# Patient Record
Sex: FEMALE | Race: BLACK OR AFRICAN AMERICAN | NOT HISPANIC OR LATINO | Employment: UNEMPLOYED | URBAN - METROPOLITAN AREA
[De-identification: names, ages, dates, MRNs, and addresses within clinical notes are randomized per-mention and may not be internally consistent; named-entity substitution may affect disease eponyms.]

---

## 2018-10-16 ENCOUNTER — OUTSIDE PLACE OF SERVICE (OUTPATIENT)
Dept: CARDIOLOGY | Facility: CLINIC | Age: 63
End: 2018-10-16
Payer: MEDICAID

## 2018-10-16 PROCEDURE — 93010 ELECTROCARDIOGRAM REPORT: CPT | Mod: S$GLB,,, | Performed by: INTERNAL MEDICINE

## 2019-01-31 ENCOUNTER — OUTSIDE PLACE OF SERVICE (OUTPATIENT)
Dept: CARDIOLOGY | Facility: CLINIC | Age: 64
End: 2019-01-31
Payer: MEDICAID

## 2019-01-31 PROCEDURE — 93010 PR ELECTROCARDIOGRAM REPORT: ICD-10-PCS | Mod: S$GLB,,, | Performed by: INTERNAL MEDICINE

## 2019-01-31 PROCEDURE — 93010 ELECTROCARDIOGRAM REPORT: CPT | Mod: S$GLB,,, | Performed by: INTERNAL MEDICINE

## 2021-03-12 ENCOUNTER — HOSPITAL ENCOUNTER (INPATIENT)
Facility: HOSPITAL | Age: 66
LOS: 2 days | Discharge: HOME | DRG: 247 | End: 2021-03-14
Attending: EMERGENCY MEDICINE | Admitting: INTERNAL MEDICINE
Payer: COMMERCIAL

## 2021-03-12 DIAGNOSIS — I21.3 ACUTE ST ELEVATION MYOCARDIAL INFARCTION (STEMI): ICD-10-CM

## 2021-03-12 DIAGNOSIS — I21.11 ACUTE ST ELEVATION MYOCARDIAL INFARCTION (STEMI) INVOLVING RIGHT CORONARY ARTERY: ICD-10-CM

## 2021-03-12 DIAGNOSIS — I21.11 ST ELEVATION MYOCARDIAL INFARCTION INVOLVING RIGHT CORONARY ARTERY: ICD-10-CM

## 2021-03-12 DIAGNOSIS — I21.02 ST ELEVATION MYOCARDIAL INFARCTION INVOLVING LEFT ANTERIOR DESCENDING (LAD) CORONARY ARTERY: ICD-10-CM

## 2021-03-12 DIAGNOSIS — R07.9 CHEST PAIN: ICD-10-CM

## 2021-03-12 DIAGNOSIS — I21.3 STEMI (ST ELEVATION MYOCARDIAL INFARCTION): Primary | ICD-10-CM

## 2021-03-12 LAB
ABO + RH BLD: NORMAL
ALBUMIN SERPL BCP-MCNC: 4 G/DL (ref 3.5–5.2)
ALP SERPL-CCNC: 145 U/L (ref 55–135)
ALT SERPL W/O P-5'-P-CCNC: 20 U/L (ref 10–44)
ANION GAP SERPL CALC-SCNC: 11 MMOL/L (ref 8–16)
AORTIC ROOT ANNULUS: 2.59 CM
ASCENDING AORTA: 2.61 CM
AST SERPL-CCNC: 132 U/L (ref 10–40)
AV INDEX (PROSTH): 0.86
AV MEAN GRADIENT: 3 MMHG
AV PEAK GRADIENT: 6 MMHG
AV VALVE AREA: 2.43 CM2
AV VELOCITY RATIO: 0.76
BASOPHILS # BLD AUTO: 0.07 K/UL (ref 0–0.2)
BASOPHILS NFR BLD: 0.3 % (ref 0–1.9)
BILIRUB SERPL-MCNC: 0.3 MG/DL (ref 0.1–1)
BLD GP AB SCN CELLS X3 SERPL QL: NORMAL
BNP SERPL-MCNC: 25 PG/ML (ref 0–99)
BUN SERPL-MCNC: 16 MG/DL (ref 8–23)
CALCIUM SERPL-MCNC: 9.7 MG/DL (ref 8.7–10.5)
CHLORIDE SERPL-SCNC: 104 MMOL/L (ref 95–110)
CK SERPL-CCNC: 2181 U/L (ref 20–180)
CO2 SERPL-SCNC: 24 MMOL/L (ref 23–29)
CREAT SERPL-MCNC: 0.9 MG/DL (ref 0.5–1.4)
CV ECHO LV RWT: 0.76 CM
DIFFERENTIAL METHOD: ABNORMAL
DOP CALC AO PEAK VEL: 1.19 M/S
DOP CALC AO VTI: 21.65 CM
DOP CALC LVOT AREA: 2.8 CM2
DOP CALC LVOT DIAMETER: 1.9 CM
DOP CALC LVOT PEAK VEL: 0.91 M/S
DOP CALC LVOT STROKE VOLUME: 52.62 CM3
DOP CALC RVOT PEAK VEL: 0.58 M/S
DOP CALC RVOT VTI: 10.14 CM
DOP CALCLVOT PEAK VEL VTI: 18.57 CM
E WAVE DECELERATION TIME: 176.21 MSEC
E/A RATIO: 0.56
E/E' RATIO: 8.83 M/S
ECHO LV POSTERIOR WALL: 1.28 CM (ref 0.6–1.1)
EOSINOPHIL # BLD AUTO: 0.1 K/UL (ref 0–0.5)
EOSINOPHIL NFR BLD: 0.2 % (ref 0–8)
ERYTHROCYTE [DISTWIDTH] IN BLOOD BY AUTOMATED COUNT: 15 % (ref 11.5–14.5)
EST. GFR  (AFRICAN AMERICAN): >60 ML/MIN/1.73 M^2
EST. GFR  (NON AFRICAN AMERICAN): >60 ML/MIN/1.73 M^2
FRACTIONAL SHORTENING: 28 % (ref 28–44)
GLUCOSE SERPL-MCNC: 128 MG/DL (ref 70–110)
HCT VFR BLD AUTO: 42.4 % (ref 37–48.5)
HGB BLD-MCNC: 13.4 G/DL (ref 12–16)
IMM GRANULOCYTES # BLD AUTO: 0.14 K/UL (ref 0–0.04)
IMM GRANULOCYTES NFR BLD AUTO: 0.7 % (ref 0–0.5)
INTERVENTRICULAR SEPTUM: 1.38 CM (ref 0.6–1.1)
IVRT: 77.07 MSEC
LA MAJOR: 3.87 CM
LA MINOR: 3.48 CM
LEFT ATRIUM SIZE: 3.31 CM
LEFT INTERNAL DIMENSION IN SYSTOLE: 2.45 CM (ref 2.1–4)
LEFT VENTRICLE DIASTOLIC VOLUME: 46.82 ML
LEFT VENTRICLE SYSTOLIC VOLUME: 21.2 ML
LEFT VENTRICULAR INTERNAL DIMENSION IN DIASTOLE: 3.38 CM (ref 3.5–6)
LEFT VENTRICULAR MASS: 151.8 G
LIPASE SERPL-CCNC: 19 U/L (ref 4–60)
LV LATERAL E/E' RATIO: 7.57 M/S
LV SEPTAL E/E' RATIO: 10.6 M/S
LYMPHOCYTES # BLD AUTO: 2.7 K/UL (ref 1–4.8)
LYMPHOCYTES NFR BLD: 13.1 % (ref 18–48)
MCH RBC QN AUTO: 25.3 PG (ref 27–31)
MCHC RBC AUTO-ENTMCNC: 31.6 G/DL (ref 32–36)
MCV RBC AUTO: 80 FL (ref 82–98)
MONOCYTES # BLD AUTO: 0.8 K/UL (ref 0.3–1)
MONOCYTES NFR BLD: 3.7 % (ref 4–15)
MV PEAK A VEL: 0.95 M/S
MV PEAK E VEL: 0.53 M/S
MV STENOSIS PRESSURE HALF TIME: 51.1 MS
MV VALVE AREA P 1/2 METHOD: 4.31 CM2
NEUTROPHILS # BLD AUTO: 16.9 K/UL (ref 1.8–7.7)
NEUTROPHILS NFR BLD: 82 % (ref 38–73)
NRBC BLD-RTO: 0 /100 WBC
PISA MRMAX VEL: 0.04 M/S
PISA TR MAX VEL: 2.14 M/S
PLATELET # BLD AUTO: 328 K/UL (ref 150–350)
PMV BLD AUTO: 10 FL (ref 9.2–12.9)
POC ACTIVATED CLOTTING TIME K: 246 SEC (ref 74–137)
POC ACTIVATED CLOTTING TIME K: 384 SEC (ref 74–137)
POCT GLUCOSE: 101 MG/DL (ref 70–110)
POCT GLUCOSE: 135 MG/DL (ref 70–110)
POCT GLUCOSE: 137 MG/DL (ref 70–110)
POTASSIUM SERPL-SCNC: 4.1 MMOL/L (ref 3.5–5.1)
PROT SERPL-MCNC: 8 G/DL (ref 6–8.4)
PV MEAN GRADIENT: 1 MMHG
RA MAJOR: 3.54 CM
RA PRESSURE: 3 MMHG
RBC # BLD AUTO: 5.3 M/UL (ref 4–5.4)
SAMPLE: ABNORMAL
SAMPLE: ABNORMAL
SINUS: 2.72 CM
SODIUM SERPL-SCNC: 139 MMOL/L (ref 136–145)
STJ: 2.62 CM
TDI LATERAL: 0.07 M/S
TDI SEPTAL: 0.05 M/S
TDI: 0.06 M/S
TR MAX PG: 18 MMHG
TRICUSPID ANNULAR PLANE SYSTOLIC EXCURSION: 1.72 CM
TROPONIN I SERPL DL<=0.01 NG/ML-MCNC: 12.29 NG/ML (ref 0–0.03)
TV REST PULMONARY ARTERY PRESSURE: 21 MMHG
WBC # BLD AUTO: 20.61 K/UL (ref 3.9–12.7)

## 2021-03-12 PROCEDURE — 36415 COLL VENOUS BLD VENIPUNCTURE: CPT | Performed by: EMERGENCY MEDICINE

## 2021-03-12 PROCEDURE — 25000003 PHARM REV CODE 250: Performed by: INTERNAL MEDICINE

## 2021-03-12 PROCEDURE — 99152 MOD SED SAME PHYS/QHP 5/>YRS: CPT | Performed by: INTERNAL MEDICINE

## 2021-03-12 PROCEDURE — C1887 CATHETER, GUIDING: HCPCS | Performed by: INTERNAL MEDICINE

## 2021-03-12 PROCEDURE — C1725 CATH, TRANSLUMIN NON-LASER: HCPCS | Performed by: INTERNAL MEDICINE

## 2021-03-12 PROCEDURE — 99291 CRITICAL CARE FIRST HOUR: CPT | Mod: 25

## 2021-03-12 PROCEDURE — 99153 MOD SED SAME PHYS/QHP EA: CPT | Performed by: INTERNAL MEDICINE

## 2021-03-12 PROCEDURE — C1894 INTRO/SHEATH, NON-LASER: HCPCS | Performed by: INTERNAL MEDICINE

## 2021-03-12 PROCEDURE — 83690 ASSAY OF LIPASE: CPT | Performed by: EMERGENCY MEDICINE

## 2021-03-12 PROCEDURE — 84484 ASSAY OF TROPONIN QUANT: CPT | Performed by: EMERGENCY MEDICINE

## 2021-03-12 PROCEDURE — 93458 L HRT ARTERY/VENTRICLE ANGIO: CPT | Mod: 26,59,51, | Performed by: INTERNAL MEDICINE

## 2021-03-12 PROCEDURE — 21400001 HC TELEMETRY ROOM

## 2021-03-12 PROCEDURE — 27201423 OPTIME MED/SURG SUP & DEVICES STERILE SUPPLY: Performed by: INTERNAL MEDICINE

## 2021-03-12 PROCEDURE — 99291 PR CRITICAL CARE, E/M 30-74 MINUTES: ICD-10-PCS | Mod: 25,,, | Performed by: INTERNAL MEDICINE

## 2021-03-12 PROCEDURE — 25500020 PHARM REV CODE 255: Performed by: INTERNAL MEDICINE

## 2021-03-12 PROCEDURE — 85025 COMPLETE CBC W/AUTO DIFF WBC: CPT | Performed by: EMERGENCY MEDICINE

## 2021-03-12 PROCEDURE — C1874 STENT, COATED/COV W/DEL SYS: HCPCS | Performed by: INTERNAL MEDICINE

## 2021-03-12 PROCEDURE — 25000003 PHARM REV CODE 250: Performed by: EMERGENCY MEDICINE

## 2021-03-12 PROCEDURE — C9606 PERC D-E COR REVASC W AMI S: HCPCS | Performed by: INTERNAL MEDICINE

## 2021-03-12 PROCEDURE — 86900 BLOOD TYPING SEROLOGIC ABO: CPT | Performed by: EMERGENCY MEDICINE

## 2021-03-12 PROCEDURE — 93458 PR CATH PLACE/CORON ANGIO, IMG SUPER/INTERP,W LEFT HEART VENTRICULOGRAPHY: ICD-10-PCS | Mod: 26,59,51, | Performed by: INTERNAL MEDICINE

## 2021-03-12 PROCEDURE — 93458 L HRT ARTERY/VENTRICLE ANGIO: CPT | Performed by: INTERNAL MEDICINE

## 2021-03-12 PROCEDURE — 63600175 PHARM REV CODE 636 W HCPCS: Performed by: INTERNAL MEDICINE

## 2021-03-12 PROCEDURE — 93010 EKG 12-LEAD: ICD-10-PCS | Mod: ,,, | Performed by: INTERNAL MEDICINE

## 2021-03-12 PROCEDURE — 99152 MOD SED SAME PHYS/QHP 5/>YRS: CPT | Mod: ,,, | Performed by: INTERNAL MEDICINE

## 2021-03-12 PROCEDURE — C1769 GUIDE WIRE: HCPCS | Performed by: INTERNAL MEDICINE

## 2021-03-12 PROCEDURE — 99152 PR MOD CONSCIOUS SEDATION, SAME PHYS, 5+ YRS, FIRST 15 MIN: ICD-10-PCS | Mod: ,,, | Performed by: INTERNAL MEDICINE

## 2021-03-12 PROCEDURE — 80053 COMPREHEN METABOLIC PANEL: CPT | Performed by: EMERGENCY MEDICINE

## 2021-03-12 PROCEDURE — 25000003 PHARM REV CODE 250: Performed by: NURSE PRACTITIONER

## 2021-03-12 PROCEDURE — 92941 PR AMI ANY METHOD: ICD-10-PCS | Mod: RC,,, | Performed by: INTERNAL MEDICINE

## 2021-03-12 PROCEDURE — 99291 CRITICAL CARE FIRST HOUR: CPT | Mod: 25,,, | Performed by: INTERNAL MEDICINE

## 2021-03-12 PROCEDURE — C1760 CLOSURE DEV, VASC: HCPCS | Performed by: INTERNAL MEDICINE

## 2021-03-12 PROCEDURE — 92941 PRQ TRLML REVSC TOT OCCL AMI: CPT | Mod: RC,,, | Performed by: INTERNAL MEDICINE

## 2021-03-12 PROCEDURE — 85347 COAGULATION TIME ACTIVATED: CPT | Performed by: INTERNAL MEDICINE

## 2021-03-12 PROCEDURE — 93005 ELECTROCARDIOGRAM TRACING: CPT

## 2021-03-12 PROCEDURE — 93010 ELECTROCARDIOGRAM REPORT: CPT | Mod: ,,, | Performed by: INTERNAL MEDICINE

## 2021-03-12 PROCEDURE — 82550 ASSAY OF CK (CPK): CPT | Performed by: EMERGENCY MEDICINE

## 2021-03-12 PROCEDURE — 83880 ASSAY OF NATRIURETIC PEPTIDE: CPT | Performed by: EMERGENCY MEDICINE

## 2021-03-12 PROCEDURE — 94761 N-INVAS EAR/PLS OXIMETRY MLT: CPT

## 2021-03-12 DEVICE — ANGIO-SEAL VIP VASCULAR CLOSURE DEVICE
Type: IMPLANTABLE DEVICE | Site: HEART | Status: FUNCTIONAL
Brand: ANGIO-SEAL

## 2021-03-12 DEVICE — IMPLANTABLE DEVICE: Type: IMPLANTABLE DEVICE | Site: HEART | Status: FUNCTIONAL

## 2021-03-12 RX ORDER — ATORVASTATIN CALCIUM 40 MG/1
80 TABLET, FILM COATED ORAL NIGHTLY
Status: DISCONTINUED | OUTPATIENT
Start: 2021-03-12 | End: 2021-03-14 | Stop reason: HOSPADM

## 2021-03-12 RX ORDER — ONDANSETRON 8 MG/1
8 TABLET, ORALLY DISINTEGRATING ORAL EVERY 8 HOURS PRN
Status: DISCONTINUED | OUTPATIENT
Start: 2021-03-12 | End: 2021-03-14 | Stop reason: HOSPADM

## 2021-03-12 RX ORDER — DIPHENHYDRAMINE HYDROCHLORIDE 50 MG/ML
INJECTION INTRAMUSCULAR; INTRAVENOUS
Status: DISCONTINUED | OUTPATIENT
Start: 2021-03-12 | End: 2021-03-12 | Stop reason: HOSPADM

## 2021-03-12 RX ORDER — ACETAMINOPHEN 325 MG/1
650 TABLET ORAL EVERY 4 HOURS PRN
Status: DISCONTINUED | OUTPATIENT
Start: 2021-03-12 | End: 2021-03-14 | Stop reason: HOSPADM

## 2021-03-12 RX ORDER — FENTANYL CITRATE 50 UG/ML
INJECTION, SOLUTION INTRAMUSCULAR; INTRAVENOUS
Status: DISCONTINUED | OUTPATIENT
Start: 2021-03-12 | End: 2021-03-12 | Stop reason: HOSPADM

## 2021-03-12 RX ORDER — METOPROLOL TARTRATE 25 MG/1
25 TABLET, FILM COATED ORAL 2 TIMES DAILY
Status: DISCONTINUED | OUTPATIENT
Start: 2021-03-12 | End: 2021-03-14 | Stop reason: HOSPADM

## 2021-03-12 RX ORDER — PHENYLEPHRINE HYDROCHLORIDE 10 MG/ML
INJECTION INTRAVENOUS
Status: DISCONTINUED | OUTPATIENT
Start: 2021-03-12 | End: 2021-03-12 | Stop reason: HOSPADM

## 2021-03-12 RX ORDER — ASPIRIN 325 MG
325 TABLET ORAL
Status: COMPLETED | OUTPATIENT
Start: 2021-03-12 | End: 2021-03-12

## 2021-03-12 RX ORDER — GABAPENTIN 300 MG/1
300 CAPSULE ORAL 2 TIMES DAILY
Status: DISCONTINUED | OUTPATIENT
Start: 2021-03-12 | End: 2021-03-14 | Stop reason: HOSPADM

## 2021-03-12 RX ORDER — HEPARIN SODIUM 1000 [USP'U]/ML
INJECTION INTRAVENOUS; SUBCUTANEOUS
Status: DISCONTINUED | OUTPATIENT
Start: 2021-03-12 | End: 2021-03-12 | Stop reason: HOSPADM

## 2021-03-12 RX ORDER — MUPIROCIN 20 MG/G
OINTMENT TOPICAL 2 TIMES DAILY
Status: DISCONTINUED | OUTPATIENT
Start: 2021-03-12 | End: 2021-03-14 | Stop reason: HOSPADM

## 2021-03-12 RX ORDER — NITROGLYCERIN 5 MG/ML
INJECTION, SOLUTION INTRAVENOUS
Status: DISCONTINUED | OUTPATIENT
Start: 2021-03-12 | End: 2021-03-12 | Stop reason: HOSPADM

## 2021-03-12 RX ORDER — LIDOCAINE HYDROCHLORIDE 20 MG/ML
INJECTION, SOLUTION EPIDURAL; INFILTRATION; INTRACAUDAL; PERINEURAL
Status: DISCONTINUED | OUTPATIENT
Start: 2021-03-12 | End: 2021-03-12 | Stop reason: HOSPADM

## 2021-03-12 RX ORDER — MIDAZOLAM HYDROCHLORIDE 1 MG/ML
INJECTION, SOLUTION INTRAMUSCULAR; INTRAVENOUS
Status: DISCONTINUED | OUTPATIENT
Start: 2021-03-12 | End: 2021-03-12 | Stop reason: HOSPADM

## 2021-03-12 RX ORDER — ASPIRIN 81 MG/1
81 TABLET ORAL DAILY
Status: DISCONTINUED | OUTPATIENT
Start: 2021-03-12 | End: 2021-03-14 | Stop reason: HOSPADM

## 2021-03-12 RX ADMIN — ATORVASTATIN CALCIUM 80 MG: 40 TABLET, FILM COATED ORAL at 09:03

## 2021-03-12 RX ADMIN — GABAPENTIN 300 MG: 300 CAPSULE ORAL at 09:03

## 2021-03-12 RX ADMIN — SODIUM CHLORIDE 1000 ML: 0.9 INJECTION, SOLUTION INTRAVENOUS at 08:03

## 2021-03-12 RX ADMIN — ASPIRIN 325 MG ORAL TABLET 325 MG: 325 PILL ORAL at 08:03

## 2021-03-12 RX ADMIN — MUPIROCIN: 20 OINTMENT TOPICAL at 09:03

## 2021-03-12 RX ADMIN — TICAGRELOR 90 MG: 90 TABLET ORAL at 09:03

## 2021-03-12 RX ADMIN — GABAPENTIN 300 MG: 300 CAPSULE ORAL at 11:03

## 2021-03-12 RX ADMIN — ASPIRIN 81 MG: 81 TABLET, COATED ORAL at 11:03

## 2021-03-12 RX ADMIN — SODIUM CHLORIDE 1000 ML: 0.9 INJECTION, SOLUTION INTRAVENOUS at 11:03

## 2021-03-12 RX ADMIN — METOPROLOL TARTRATE 25 MG: 25 TABLET, FILM COATED ORAL at 09:03

## 2021-03-13 PROBLEM — D72.829 LEUKOCYTOSIS: Status: ACTIVE | Noted: 2021-03-13

## 2021-03-13 PROBLEM — E78.2 MIXED HYPERLIPIDEMIA: Status: ACTIVE | Noted: 2021-03-13

## 2021-03-13 PROBLEM — I10 ESSENTIAL HYPERTENSION: Status: ACTIVE | Noted: 2021-03-13

## 2021-03-13 LAB
ANION GAP SERPL CALC-SCNC: 9 MMOL/L (ref 8–16)
BASOPHILS # BLD AUTO: 0.08 K/UL (ref 0–0.2)
BASOPHILS NFR BLD: 0.5 % (ref 0–1.9)
BUN SERPL-MCNC: 18 MG/DL (ref 8–23)
CALCIUM SERPL-MCNC: 9.1 MG/DL (ref 8.7–10.5)
CHLORIDE SERPL-SCNC: 107 MMOL/L (ref 95–110)
CHOLEST SERPL-MCNC: 166 MG/DL (ref 120–199)
CHOLEST/HDLC SERPL: 4.6 {RATIO} (ref 2–5)
CO2 SERPL-SCNC: 23 MMOL/L (ref 23–29)
CREAT SERPL-MCNC: 0.8 MG/DL (ref 0.5–1.4)
DIFFERENTIAL METHOD: ABNORMAL
EOSINOPHIL # BLD AUTO: 0.3 K/UL (ref 0–0.5)
EOSINOPHIL NFR BLD: 2.1 % (ref 0–8)
ERYTHROCYTE [DISTWIDTH] IN BLOOD BY AUTOMATED COUNT: 15.3 % (ref 11.5–14.5)
EST. GFR  (AFRICAN AMERICAN): >60 ML/MIN/1.73 M^2
EST. GFR  (NON AFRICAN AMERICAN): >60 ML/MIN/1.73 M^2
GLUCOSE SERPL-MCNC: 100 MG/DL (ref 70–110)
HCT VFR BLD AUTO: 41.9 % (ref 37–48.5)
HDLC SERPL-MCNC: 36 MG/DL (ref 40–75)
HDLC SERPL: 21.7 % (ref 20–50)
HGB BLD-MCNC: 12.8 G/DL (ref 12–16)
IMM GRANULOCYTES # BLD AUTO: 0.08 K/UL (ref 0–0.04)
IMM GRANULOCYTES NFR BLD AUTO: 0.5 % (ref 0–0.5)
LDLC SERPL CALC-MCNC: 99 MG/DL (ref 63–159)
LYMPHOCYTES # BLD AUTO: 3.5 K/UL (ref 1–4.8)
LYMPHOCYTES NFR BLD: 21.8 % (ref 18–48)
MCH RBC QN AUTO: 24.8 PG (ref 27–31)
MCHC RBC AUTO-ENTMCNC: 30.5 G/DL (ref 32–36)
MCV RBC AUTO: 81 FL (ref 82–98)
MONOCYTES # BLD AUTO: 1 K/UL (ref 0.3–1)
MONOCYTES NFR BLD: 6.4 % (ref 4–15)
NEUTROPHILS # BLD AUTO: 11.1 K/UL (ref 1.8–7.7)
NEUTROPHILS NFR BLD: 68.7 % (ref 38–73)
NONHDLC SERPL-MCNC: 130 MG/DL
NRBC BLD-RTO: 0 /100 WBC
PLATELET # BLD AUTO: 297 K/UL (ref 150–350)
PMV BLD AUTO: 9.9 FL (ref 9.2–12.9)
POCT GLUCOSE: 102 MG/DL (ref 70–110)
POCT GLUCOSE: 102 MG/DL (ref 70–110)
POCT GLUCOSE: 107 MG/DL (ref 70–110)
POCT GLUCOSE: 87 MG/DL (ref 70–110)
POTASSIUM SERPL-SCNC: 3.5 MMOL/L (ref 3.5–5.1)
RBC # BLD AUTO: 5.16 M/UL (ref 4–5.4)
SODIUM SERPL-SCNC: 139 MMOL/L (ref 136–145)
TRIGL SERPL-MCNC: 155 MG/DL (ref 30–150)
TROPONIN I SERPL DL<=0.01 NG/ML-MCNC: >50 NG/ML (ref 0–0.03)
WBC # BLD AUTO: 16.11 K/UL (ref 3.9–12.7)

## 2021-03-13 PROCEDURE — 80048 BASIC METABOLIC PNL TOTAL CA: CPT | Performed by: NURSE PRACTITIONER

## 2021-03-13 PROCEDURE — 25000003 PHARM REV CODE 250: Performed by: NURSE PRACTITIONER

## 2021-03-13 PROCEDURE — 80061 LIPID PANEL: CPT | Performed by: NURSE PRACTITIONER

## 2021-03-13 PROCEDURE — 99232 SBSQ HOSP IP/OBS MODERATE 35: CPT | Mod: ,,, | Performed by: INTERNAL MEDICINE

## 2021-03-13 PROCEDURE — 99232 PR SUBSEQUENT HOSPITAL CARE,LEVL II: ICD-10-PCS | Mod: ,,, | Performed by: INTERNAL MEDICINE

## 2021-03-13 PROCEDURE — 84484 ASSAY OF TROPONIN QUANT: CPT | Performed by: NURSE PRACTITIONER

## 2021-03-13 PROCEDURE — 94761 N-INVAS EAR/PLS OXIMETRY MLT: CPT

## 2021-03-13 PROCEDURE — 21400001 HC TELEMETRY ROOM

## 2021-03-13 PROCEDURE — 36415 COLL VENOUS BLD VENIPUNCTURE: CPT | Performed by: NURSE PRACTITIONER

## 2021-03-13 PROCEDURE — 25000003 PHARM REV CODE 250: Performed by: INTERNAL MEDICINE

## 2021-03-13 PROCEDURE — 85025 COMPLETE CBC W/AUTO DIFF WBC: CPT | Performed by: NURSE PRACTITIONER

## 2021-03-13 RX ADMIN — ATORVASTATIN CALCIUM 80 MG: 40 TABLET, FILM COATED ORAL at 09:03

## 2021-03-13 RX ADMIN — GABAPENTIN 300 MG: 300 CAPSULE ORAL at 09:03

## 2021-03-13 RX ADMIN — METOPROLOL TARTRATE 25 MG: 25 TABLET, FILM COATED ORAL at 09:03

## 2021-03-13 RX ADMIN — ASPIRIN 81 MG: 81 TABLET, COATED ORAL at 08:03

## 2021-03-13 RX ADMIN — MUPIROCIN: 20 OINTMENT TOPICAL at 08:03

## 2021-03-13 RX ADMIN — TICAGRELOR 90 MG: 90 TABLET ORAL at 09:03

## 2021-03-13 RX ADMIN — METOPROLOL TARTRATE 25 MG: 25 TABLET, FILM COATED ORAL at 08:03

## 2021-03-13 RX ADMIN — MUPIROCIN: 20 OINTMENT TOPICAL at 09:03

## 2021-03-13 RX ADMIN — GABAPENTIN 300 MG: 300 CAPSULE ORAL at 08:03

## 2021-03-13 RX ADMIN — TICAGRELOR 90 MG: 90 TABLET ORAL at 08:03

## 2021-03-14 VITALS
BODY MASS INDEX: 33.55 KG/M2 | DIASTOLIC BLOOD PRESSURE: 76 MMHG | TEMPERATURE: 98 F | HEIGHT: 61 IN | RESPIRATION RATE: 18 BRPM | WEIGHT: 177.69 LBS | HEART RATE: 68 BPM | SYSTOLIC BLOOD PRESSURE: 128 MMHG | OXYGEN SATURATION: 98 %

## 2021-03-14 LAB
ANION GAP SERPL CALC-SCNC: 9 MMOL/L (ref 8–16)
BASOPHILS # BLD AUTO: 0.06 K/UL (ref 0–0.2)
BASOPHILS NFR BLD: 0.4 % (ref 0–1.9)
BUN SERPL-MCNC: 16 MG/DL (ref 8–23)
CALCIUM SERPL-MCNC: 8.9 MG/DL (ref 8.7–10.5)
CHLORIDE SERPL-SCNC: 108 MMOL/L (ref 95–110)
CO2 SERPL-SCNC: 23 MMOL/L (ref 23–29)
CREAT SERPL-MCNC: 0.7 MG/DL (ref 0.5–1.4)
DIFFERENTIAL METHOD: ABNORMAL
EOSINOPHIL # BLD AUTO: 0.4 K/UL (ref 0–0.5)
EOSINOPHIL NFR BLD: 2.5 % (ref 0–8)
ERYTHROCYTE [DISTWIDTH] IN BLOOD BY AUTOMATED COUNT: 15.3 % (ref 11.5–14.5)
EST. GFR  (AFRICAN AMERICAN): >60 ML/MIN/1.73 M^2
EST. GFR  (NON AFRICAN AMERICAN): >60 ML/MIN/1.73 M^2
GLUCOSE SERPL-MCNC: 88 MG/DL (ref 70–110)
HCT VFR BLD AUTO: 37.7 % (ref 37–48.5)
HGB BLD-MCNC: 11.6 G/DL (ref 12–16)
IMM GRANULOCYTES # BLD AUTO: 0.08 K/UL (ref 0–0.04)
IMM GRANULOCYTES NFR BLD AUTO: 0.5 % (ref 0–0.5)
LYMPHOCYTES # BLD AUTO: 3.7 K/UL (ref 1–4.8)
LYMPHOCYTES NFR BLD: 24.1 % (ref 18–48)
MCH RBC QN AUTO: 25 PG (ref 27–31)
MCHC RBC AUTO-ENTMCNC: 30.8 G/DL (ref 32–36)
MCV RBC AUTO: 81 FL (ref 82–98)
MONOCYTES # BLD AUTO: 1.3 K/UL (ref 0.3–1)
MONOCYTES NFR BLD: 8.3 % (ref 4–15)
NEUTROPHILS # BLD AUTO: 9.9 K/UL (ref 1.8–7.7)
NEUTROPHILS NFR BLD: 64.2 % (ref 38–73)
NRBC BLD-RTO: 0 /100 WBC
PLATELET # BLD AUTO: 252 K/UL (ref 150–350)
PMV BLD AUTO: 10.2 FL (ref 9.2–12.9)
POCT GLUCOSE: 100 MG/DL (ref 70–110)
POTASSIUM SERPL-SCNC: 3.6 MMOL/L (ref 3.5–5.1)
RBC # BLD AUTO: 4.64 M/UL (ref 4–5.4)
SODIUM SERPL-SCNC: 140 MMOL/L (ref 136–145)
WBC # BLD AUTO: 15.49 K/UL (ref 3.9–12.7)

## 2021-03-14 PROCEDURE — 25000003 PHARM REV CODE 250: Performed by: NURSE PRACTITIONER

## 2021-03-14 PROCEDURE — 94761 N-INVAS EAR/PLS OXIMETRY MLT: CPT

## 2021-03-14 PROCEDURE — 36415 COLL VENOUS BLD VENIPUNCTURE: CPT | Performed by: NURSE PRACTITIONER

## 2021-03-14 PROCEDURE — 85025 COMPLETE CBC W/AUTO DIFF WBC: CPT | Performed by: NURSE PRACTITIONER

## 2021-03-14 PROCEDURE — 99238 PR HOSPITAL DISCHARGE DAY,<30 MIN: ICD-10-PCS | Mod: ,,, | Performed by: NURSE PRACTITIONER

## 2021-03-14 PROCEDURE — 80048 BASIC METABOLIC PNL TOTAL CA: CPT | Performed by: NURSE PRACTITIONER

## 2021-03-14 PROCEDURE — 99238 HOSP IP/OBS DSCHRG MGMT 30/<: CPT | Mod: ,,, | Performed by: NURSE PRACTITIONER

## 2021-03-14 RX ORDER — METOPROLOL TARTRATE 25 MG/1
25 TABLET, FILM COATED ORAL 2 TIMES DAILY
Qty: 60 TABLET | Refills: 11 | Status: SHIPPED | OUTPATIENT
Start: 2021-03-14 | End: 2021-04-01

## 2021-03-14 RX ORDER — GABAPENTIN 300 MG/1
300 CAPSULE ORAL 2 TIMES DAILY
Qty: 60 CAPSULE | Refills: 11 | Status: SHIPPED | OUTPATIENT
Start: 2021-03-14 | End: 2023-08-08

## 2021-03-14 RX ORDER — ATORVASTATIN CALCIUM 80 MG/1
80 TABLET, FILM COATED ORAL NIGHTLY
Qty: 90 TABLET | Refills: 3 | Status: SHIPPED | OUTPATIENT
Start: 2021-03-14 | End: 2022-09-01 | Stop reason: SDUPTHER

## 2021-03-14 RX ORDER — ASPIRIN 81 MG/1
81 TABLET ORAL DAILY
Qty: 90 TABLET | Refills: 3 | Status: SHIPPED | OUTPATIENT
Start: 2021-03-15 | End: 2024-03-19

## 2021-03-14 RX ADMIN — TICAGRELOR 90 MG: 90 TABLET ORAL at 08:03

## 2021-03-14 RX ADMIN — ASPIRIN 81 MG: 81 TABLET, COATED ORAL at 08:03

## 2021-03-14 RX ADMIN — MUPIROCIN: 20 OINTMENT TOPICAL at 08:03

## 2021-03-14 RX ADMIN — METOPROLOL TARTRATE 25 MG: 25 TABLET, FILM COATED ORAL at 08:03

## 2021-03-14 RX ADMIN — GABAPENTIN 300 MG: 300 CAPSULE ORAL at 08:03

## 2021-04-01 ENCOUNTER — OFFICE VISIT (OUTPATIENT)
Dept: CARDIOLOGY | Facility: CLINIC | Age: 66
End: 2021-04-01
Payer: MEDICARE

## 2021-04-01 VITALS
HEART RATE: 52 BPM | BODY MASS INDEX: 32.87 KG/M2 | OXYGEN SATURATION: 98 % | SYSTOLIC BLOOD PRESSURE: 106 MMHG | WEIGHT: 173.94 LBS | DIASTOLIC BLOOD PRESSURE: 64 MMHG

## 2021-04-01 DIAGNOSIS — E78.2 MIXED HYPERLIPIDEMIA: ICD-10-CM

## 2021-04-01 DIAGNOSIS — I10 ESSENTIAL HYPERTENSION: ICD-10-CM

## 2021-04-01 DIAGNOSIS — M79.89 LOCALIZED SWELLING OF BOTH LOWER EXTREMITIES: ICD-10-CM

## 2021-04-01 DIAGNOSIS — R00.1 SINUS BRADYCARDIA: ICD-10-CM

## 2021-04-01 DIAGNOSIS — R06.02 SHORTNESS OF BREATH: Primary | ICD-10-CM

## 2021-04-01 DIAGNOSIS — I73.9 LEFT LEG CLAUDICATION: ICD-10-CM

## 2021-04-01 DIAGNOSIS — Z95.5 S/P CORONARY ARTERY STENT PLACEMENT: ICD-10-CM

## 2021-04-01 PROCEDURE — 3074F PR MOST RECENT SYSTOLIC BLOOD PRESSURE < 130 MM HG: ICD-10-PCS | Mod: CPTII,S$GLB,, | Performed by: INTERNAL MEDICINE

## 2021-04-01 PROCEDURE — 3008F PR BODY MASS INDEX (BMI) DOCUMENTED: ICD-10-PCS | Mod: CPTII,S$GLB,, | Performed by: INTERNAL MEDICINE

## 2021-04-01 PROCEDURE — 3008F BODY MASS INDEX DOCD: CPT | Mod: CPTII,S$GLB,, | Performed by: INTERNAL MEDICINE

## 2021-04-01 PROCEDURE — 99215 OFFICE O/P EST HI 40 MIN: CPT | Mod: S$GLB,,, | Performed by: INTERNAL MEDICINE

## 2021-04-01 PROCEDURE — 3074F SYST BP LT 130 MM HG: CPT | Mod: CPTII,S$GLB,, | Performed by: INTERNAL MEDICINE

## 2021-04-01 PROCEDURE — 3078F DIAST BP <80 MM HG: CPT | Mod: CPTII,S$GLB,, | Performed by: INTERNAL MEDICINE

## 2021-04-01 PROCEDURE — 3078F PR MOST RECENT DIASTOLIC BLOOD PRESSURE < 80 MM HG: ICD-10-PCS | Mod: CPTII,S$GLB,, | Performed by: INTERNAL MEDICINE

## 2021-04-01 PROCEDURE — 99999 PR PBB SHADOW E&M-EST. PATIENT-LVL III: CPT | Mod: PBBFAC,,, | Performed by: INTERNAL MEDICINE

## 2021-04-01 PROCEDURE — 99215 PR OFFICE/OUTPT VISIT, EST, LEVL V, 40-54 MIN: ICD-10-PCS | Mod: S$GLB,,, | Performed by: INTERNAL MEDICINE

## 2021-04-01 PROCEDURE — 99999 PR PBB SHADOW E&M-EST. PATIENT-LVL III: ICD-10-PCS | Mod: PBBFAC,,, | Performed by: INTERNAL MEDICINE

## 2021-04-01 RX ORDER — AMLODIPINE BESYLATE 10 MG/1
10 TABLET ORAL DAILY
COMMUNITY

## 2021-04-01 RX ORDER — METOPROLOL SUCCINATE 25 MG/1
25 TABLET, EXTENDED RELEASE ORAL DAILY
Qty: 30 TABLET | Refills: 11 | Status: SHIPPED | OUTPATIENT
Start: 2021-04-01 | End: 2022-06-02 | Stop reason: SDUPTHER

## 2021-04-01 RX ORDER — FUROSEMIDE 20 MG/1
20 TABLET ORAL DAILY PRN
Qty: 30 TABLET | Refills: 11 | Status: SHIPPED | OUTPATIENT
Start: 2021-04-01 | End: 2024-03-19

## 2021-04-01 RX ORDER — CLOPIDOGREL BISULFATE 75 MG/1
75 TABLET ORAL DAILY
Qty: 90 TABLET | Refills: 3 | Status: SHIPPED | OUTPATIENT
Start: 2021-04-01 | End: 2022-03-04

## 2021-04-01 RX ORDER — LISINOPRIL AND HYDROCHLOROTHIAZIDE 12.5; 2 MG/1; MG/1
1 TABLET ORAL 2 TIMES DAILY
COMMUNITY

## 2021-04-01 RX ORDER — SERTRALINE HYDROCHLORIDE 100 MG/1
100 TABLET, FILM COATED ORAL DAILY
COMMUNITY

## 2021-05-12 DIAGNOSIS — R06.02 SOB (SHORTNESS OF BREATH): Primary | ICD-10-CM

## 2021-05-13 ENCOUNTER — OFFICE VISIT (OUTPATIENT)
Dept: PULMONOLOGY | Facility: CLINIC | Age: 66
End: 2021-05-13
Payer: MEDICARE

## 2021-05-13 ENCOUNTER — HOSPITAL ENCOUNTER (OUTPATIENT)
Dept: RADIOLOGY | Facility: HOSPITAL | Age: 66
Discharge: HOME | End: 2021-05-13
Attending: NURSE PRACTITIONER
Payer: MEDICARE

## 2021-05-13 VITALS
HEIGHT: 61 IN | OXYGEN SATURATION: 99 % | RESPIRATION RATE: 16 BRPM | WEIGHT: 177.94 LBS | SYSTOLIC BLOOD PRESSURE: 122 MMHG | HEART RATE: 60 BPM | DIASTOLIC BLOOD PRESSURE: 68 MMHG | BODY MASS INDEX: 33.59 KG/M2

## 2021-05-13 DIAGNOSIS — Z01.818 PRE-OP TESTING: ICD-10-CM

## 2021-05-13 DIAGNOSIS — R06.02 SOB (SHORTNESS OF BREATH): ICD-10-CM

## 2021-05-13 DIAGNOSIS — Z87.891 HISTORY OF SMOKING: ICD-10-CM

## 2021-05-13 DIAGNOSIS — R06.02 SHORTNESS OF BREATH: ICD-10-CM

## 2021-05-13 DIAGNOSIS — G47.33 OSA (OBSTRUCTIVE SLEEP APNEA): Primary | ICD-10-CM

## 2021-05-13 PROCEDURE — 1101F PR PT FALLS ASSESS DOC 0-1 FALLS W/OUT INJ PAST YR: ICD-10-PCS | Mod: CPTII,S$GLB,, | Performed by: NURSE PRACTITIONER

## 2021-05-13 PROCEDURE — 71046 X-RAY EXAM CHEST 2 VIEWS: CPT | Mod: 26,,, | Performed by: RADIOLOGY

## 2021-05-13 PROCEDURE — 3008F PR BODY MASS INDEX (BMI) DOCUMENTED: ICD-10-PCS | Mod: CPTII,S$GLB,, | Performed by: NURSE PRACTITIONER

## 2021-05-13 PROCEDURE — 3288F FALL RISK ASSESSMENT DOCD: CPT | Mod: CPTII,S$GLB,, | Performed by: NURSE PRACTITIONER

## 2021-05-13 PROCEDURE — 1101F PT FALLS ASSESS-DOCD LE1/YR: CPT | Mod: CPTII,S$GLB,, | Performed by: NURSE PRACTITIONER

## 2021-05-13 PROCEDURE — 71046 X-RAY EXAM CHEST 2 VIEWS: CPT | Mod: TC,FY,PO

## 2021-05-13 PROCEDURE — 3288F PR FALLS RISK ASSESSMENT DOCUMENTED: ICD-10-PCS | Mod: CPTII,S$GLB,, | Performed by: NURSE PRACTITIONER

## 2021-05-13 PROCEDURE — 71046 XR CHEST PA AND LATERAL: ICD-10-PCS | Mod: 26,,, | Performed by: RADIOLOGY

## 2021-05-13 PROCEDURE — 99999 PR PBB SHADOW E&M-EST. PATIENT-LVL IV: CPT | Mod: PBBFAC,,, | Performed by: NURSE PRACTITIONER

## 2021-05-13 PROCEDURE — 99214 PR OFFICE/OUTPT VISIT, EST, LEVL IV, 30-39 MIN: ICD-10-PCS | Mod: S$GLB,,, | Performed by: NURSE PRACTITIONER

## 2021-05-13 PROCEDURE — 99214 OFFICE O/P EST MOD 30 MIN: CPT | Mod: S$GLB,,, | Performed by: NURSE PRACTITIONER

## 2021-05-13 PROCEDURE — 99999 PR PBB SHADOW E&M-EST. PATIENT-LVL IV: ICD-10-PCS | Mod: PBBFAC,,, | Performed by: NURSE PRACTITIONER

## 2021-05-13 PROCEDURE — 3008F BODY MASS INDEX DOCD: CPT | Mod: CPTII,S$GLB,, | Performed by: NURSE PRACTITIONER

## 2021-05-13 RX ORDER — QUETIAPINE FUMARATE 100 MG/1
100 TABLET, FILM COATED ORAL DAILY PRN
COMMUNITY
End: 2023-02-01

## 2021-05-13 RX ORDER — MONTELUKAST SODIUM 4 MG/1
TABLET, CHEWABLE ORAL
COMMUNITY
End: 2022-09-01

## 2021-05-13 RX ORDER — IBUPROFEN 800 MG/1
800 TABLET ORAL EVERY 4 HOURS PRN
COMMUNITY
End: 2024-03-02

## 2021-05-13 RX ORDER — ALBUTEROL SULFATE 90 UG/1
AEROSOL, METERED RESPIRATORY (INHALATION)
COMMUNITY
End: 2023-02-01 | Stop reason: SDUPTHER

## 2021-05-13 RX ORDER — LORAZEPAM 2 MG/1
2 TABLET ORAL 2 TIMES DAILY PRN
COMMUNITY
End: 2023-08-08

## 2021-05-13 RX ORDER — BUDESONIDE AND FORMOTEROL FUMARATE DIHYDRATE 160; 4.5 UG/1; UG/1
AEROSOL RESPIRATORY (INHALATION)
COMMUNITY
End: 2021-05-13

## 2021-05-16 ENCOUNTER — TELEPHONE (OUTPATIENT)
Dept: PULMONOLOGY | Facility: HOSPITAL | Age: 66
End: 2021-05-16

## 2021-06-04 ENCOUNTER — PROCEDURE VISIT (OUTPATIENT)
Dept: SLEEP MEDICINE | Facility: CLINIC | Age: 66
End: 2021-06-04
Payer: MEDICARE

## 2021-06-04 DIAGNOSIS — R06.02 SHORTNESS OF BREATH: ICD-10-CM

## 2021-06-04 DIAGNOSIS — Z87.891 HISTORY OF SMOKING: ICD-10-CM

## 2021-06-04 DIAGNOSIS — G47.33 OSA (OBSTRUCTIVE SLEEP APNEA): Primary | ICD-10-CM

## 2021-06-09 ENCOUNTER — DOCUMENTATION ONLY (OUTPATIENT)
Dept: PULMONOLOGY | Facility: CLINIC | Age: 66
End: 2021-06-09

## 2021-07-23 ENCOUNTER — HOSPITAL ENCOUNTER (EMERGENCY)
Facility: HOSPITAL | Age: 66
Discharge: HOME | End: 2021-07-23
Attending: EMERGENCY MEDICINE
Payer: MEDICARE

## 2021-07-23 VITALS
HEART RATE: 78 BPM | BODY MASS INDEX: 33.62 KG/M2 | TEMPERATURE: 98 F | DIASTOLIC BLOOD PRESSURE: 78 MMHG | SYSTOLIC BLOOD PRESSURE: 112 MMHG | HEIGHT: 61 IN | RESPIRATION RATE: 18 BRPM | OXYGEN SATURATION: 100 %

## 2021-07-23 DIAGNOSIS — R07.9 CHEST PAIN: ICD-10-CM

## 2021-07-23 LAB
ALBUMIN SERPL BCP-MCNC: 3.8 G/DL (ref 3.5–5.2)
ALP SERPL-CCNC: 126 U/L (ref 55–135)
ALT SERPL W/O P-5'-P-CCNC: 24 U/L (ref 10–44)
ANION GAP SERPL CALC-SCNC: 9 MMOL/L (ref 8–16)
ANISOCYTOSIS BLD QL SMEAR: SLIGHT
AST SERPL-CCNC: 20 U/L (ref 10–40)
BASOPHILS # BLD AUTO: 0.07 K/UL (ref 0–0.2)
BASOPHILS NFR BLD: 0.5 % (ref 0–1.9)
BILIRUB SERPL-MCNC: 0.2 MG/DL (ref 0.1–1)
BUN SERPL-MCNC: 19 MG/DL (ref 8–23)
CALCIUM SERPL-MCNC: 9.3 MG/DL (ref 8.7–10.5)
CHLORIDE SERPL-SCNC: 108 MMOL/L (ref 95–110)
CO2 SERPL-SCNC: 23 MMOL/L (ref 23–29)
CREAT SERPL-MCNC: 0.8 MG/DL (ref 0.5–1.4)
CTP QC/QA: YES
DACRYOCYTES BLD QL SMEAR: ABNORMAL
DIFFERENTIAL METHOD: ABNORMAL
EOSINOPHIL # BLD AUTO: 0.7 K/UL (ref 0–0.5)
EOSINOPHIL NFR BLD: 4.9 % (ref 0–8)
ERYTHROCYTE [DISTWIDTH] IN BLOOD BY AUTOMATED COUNT: 15.4 % (ref 11.5–14.5)
EST. GFR  (AFRICAN AMERICAN): >60 ML/MIN/1.73 M^2
EST. GFR  (NON AFRICAN AMERICAN): >60 ML/MIN/1.73 M^2
GLUCOSE SERPL-MCNC: 96 MG/DL (ref 70–110)
HCT VFR BLD AUTO: 37.5 % (ref 37–48.5)
HCV AB SERPL QL IA: NEGATIVE
HEP C VIRUS HOLD SPECIMEN: NORMAL
HGB BLD-MCNC: 11.4 G/DL (ref 12–16)
IMM GRANULOCYTES # BLD AUTO: 0.07 K/UL (ref 0–0.04)
IMM GRANULOCYTES NFR BLD AUTO: 0.5 % (ref 0–0.5)
LYMPHOCYTES # BLD AUTO: 3.3 K/UL (ref 1–4.8)
LYMPHOCYTES NFR BLD: 24.4 % (ref 18–48)
MCH RBC QN AUTO: 24.9 PG (ref 27–31)
MCHC RBC AUTO-ENTMCNC: 30.4 G/DL (ref 32–36)
MCV RBC AUTO: 82 FL (ref 82–98)
MONOCYTES # BLD AUTO: 0.9 K/UL (ref 0.3–1)
MONOCYTES NFR BLD: 6.9 % (ref 4–15)
NEUTROPHILS # BLD AUTO: 8.4 K/UL (ref 1.8–7.7)
NEUTROPHILS NFR BLD: 62.8 % (ref 38–73)
NRBC BLD-RTO: 0 /100 WBC
PLATELET # BLD AUTO: 256 K/UL (ref 150–450)
PLATELET BLD QL SMEAR: ABNORMAL
PMV BLD AUTO: 10 FL (ref 9.2–12.9)
POIKILOCYTOSIS BLD QL SMEAR: SLIGHT
POTASSIUM SERPL-SCNC: 4.3 MMOL/L (ref 3.5–5.1)
PROT SERPL-MCNC: 7.5 G/DL (ref 6–8.4)
RBC # BLD AUTO: 4.58 M/UL (ref 4–5.4)
SARS-COV-2 RDRP RESP QL NAA+PROBE: NEGATIVE
SODIUM SERPL-SCNC: 140 MMOL/L (ref 136–145)
TROPONIN I SERPL DL<=0.01 NG/ML-MCNC: 0.01 NG/ML (ref 0–0.03)
WBC # BLD AUTO: 13.38 K/UL (ref 3.9–12.7)

## 2021-07-23 PROCEDURE — 80053 COMPREHEN METABOLIC PANEL: CPT | Performed by: EMERGENCY MEDICINE

## 2021-07-23 PROCEDURE — 86803 HEPATITIS C AB TEST: CPT | Performed by: EMERGENCY MEDICINE

## 2021-07-23 PROCEDURE — 85025 COMPLETE CBC W/AUTO DIFF WBC: CPT | Performed by: EMERGENCY MEDICINE

## 2021-07-23 PROCEDURE — 84484 ASSAY OF TROPONIN QUANT: CPT | Performed by: EMERGENCY MEDICINE

## 2021-07-23 PROCEDURE — U0002 COVID-19 LAB TEST NON-CDC: HCPCS | Performed by: EMERGENCY MEDICINE

## 2021-07-23 PROCEDURE — 99284 EMERGENCY DEPT VISIT MOD MDM: CPT

## 2021-07-23 RX ORDER — ASPIRIN 325 MG
325 TABLET ORAL
Status: DISCONTINUED | OUTPATIENT
Start: 2021-07-23 | End: 2021-07-24 | Stop reason: HOSPADM

## 2022-02-08 ENCOUNTER — OFFICE VISIT (OUTPATIENT)
Dept: CARDIOLOGY | Facility: CLINIC | Age: 67
End: 2022-02-08
Payer: MEDICARE

## 2022-02-08 VITALS
WEIGHT: 191.81 LBS | HEART RATE: 61 BPM | SYSTOLIC BLOOD PRESSURE: 136 MMHG | DIASTOLIC BLOOD PRESSURE: 72 MMHG | BODY MASS INDEX: 36.24 KG/M2 | OXYGEN SATURATION: 98 %

## 2022-02-08 DIAGNOSIS — R00.1 SINUS BRADYCARDIA: ICD-10-CM

## 2022-02-08 DIAGNOSIS — I73.9 LEFT LEG CLAUDICATION: ICD-10-CM

## 2022-02-08 DIAGNOSIS — Z95.5 S/P CORONARY ARTERY STENT PLACEMENT: ICD-10-CM

## 2022-02-08 DIAGNOSIS — E66.01 MORBID OBESITY: ICD-10-CM

## 2022-02-08 DIAGNOSIS — R06.2 WHEEZING: ICD-10-CM

## 2022-02-08 DIAGNOSIS — R06.02 SHORTNESS OF BREATH: ICD-10-CM

## 2022-02-08 DIAGNOSIS — I73.9 CLAUDICATION: Primary | ICD-10-CM

## 2022-02-08 DIAGNOSIS — I10 ESSENTIAL HYPERTENSION: ICD-10-CM

## 2022-02-08 PROCEDURE — 3075F PR MOST RECENT SYSTOLIC BLOOD PRESS GE 130-139MM HG: ICD-10-PCS | Mod: CPTII,S$GLB,, | Performed by: INTERNAL MEDICINE

## 2022-02-08 PROCEDURE — 1126F PR PAIN SEVERITY QUANTIFIED, NO PAIN PRESENT: ICD-10-PCS | Mod: CPTII,S$GLB,, | Performed by: INTERNAL MEDICINE

## 2022-02-08 PROCEDURE — 4010F PR ACE/ARB THEARPY RXD/TAKEN: ICD-10-PCS | Mod: CPTII,S$GLB,, | Performed by: INTERNAL MEDICINE

## 2022-02-08 PROCEDURE — 3078F PR MOST RECENT DIASTOLIC BLOOD PRESSURE < 80 MM HG: ICD-10-PCS | Mod: CPTII,S$GLB,, | Performed by: INTERNAL MEDICINE

## 2022-02-08 PROCEDURE — 3008F BODY MASS INDEX DOCD: CPT | Mod: CPTII,S$GLB,, | Performed by: INTERNAL MEDICINE

## 2022-02-08 PROCEDURE — 3078F DIAST BP <80 MM HG: CPT | Mod: CPTII,S$GLB,, | Performed by: INTERNAL MEDICINE

## 2022-02-08 PROCEDURE — 4010F ACE/ARB THERAPY RXD/TAKEN: CPT | Mod: CPTII,S$GLB,, | Performed by: INTERNAL MEDICINE

## 2022-02-08 PROCEDURE — 1101F PR PT FALLS ASSESS DOC 0-1 FALLS W/OUT INJ PAST YR: ICD-10-PCS | Mod: CPTII,S$GLB,, | Performed by: INTERNAL MEDICINE

## 2022-02-08 PROCEDURE — 1126F AMNT PAIN NOTED NONE PRSNT: CPT | Mod: CPTII,S$GLB,, | Performed by: INTERNAL MEDICINE

## 2022-02-08 PROCEDURE — 3288F FALL RISK ASSESSMENT DOCD: CPT | Mod: CPTII,S$GLB,, | Performed by: INTERNAL MEDICINE

## 2022-02-08 PROCEDURE — 99215 OFFICE O/P EST HI 40 MIN: CPT | Mod: S$GLB,,, | Performed by: INTERNAL MEDICINE

## 2022-02-08 PROCEDURE — 99215 PR OFFICE/OUTPT VISIT, EST, LEVL V, 40-54 MIN: ICD-10-PCS | Mod: S$GLB,,, | Performed by: INTERNAL MEDICINE

## 2022-02-08 PROCEDURE — 1101F PT FALLS ASSESS-DOCD LE1/YR: CPT | Mod: CPTII,S$GLB,, | Performed by: INTERNAL MEDICINE

## 2022-02-08 PROCEDURE — 3288F PR FALLS RISK ASSESSMENT DOCUMENTED: ICD-10-PCS | Mod: CPTII,S$GLB,, | Performed by: INTERNAL MEDICINE

## 2022-02-08 PROCEDURE — 1159F PR MEDICATION LIST DOCUMENTED IN MEDICAL RECORD: ICD-10-PCS | Mod: CPTII,S$GLB,, | Performed by: INTERNAL MEDICINE

## 2022-02-08 PROCEDURE — 99999 PR PBB SHADOW E&M-EST. PATIENT-LVL III: ICD-10-PCS | Mod: PBBFAC,,, | Performed by: INTERNAL MEDICINE

## 2022-02-08 PROCEDURE — 3008F PR BODY MASS INDEX (BMI) DOCUMENTED: ICD-10-PCS | Mod: CPTII,S$GLB,, | Performed by: INTERNAL MEDICINE

## 2022-02-08 PROCEDURE — 3075F SYST BP GE 130 - 139MM HG: CPT | Mod: CPTII,S$GLB,, | Performed by: INTERNAL MEDICINE

## 2022-02-08 PROCEDURE — 99999 PR PBB SHADOW E&M-EST. PATIENT-LVL III: CPT | Mod: PBBFAC,,, | Performed by: INTERNAL MEDICINE

## 2022-02-08 PROCEDURE — 1159F MED LIST DOCD IN RCRD: CPT | Mod: CPTII,S$GLB,, | Performed by: INTERNAL MEDICINE

## 2022-02-08 RX ORDER — FLUTICASONE PROPIONATE AND SALMETEROL 100; 50 UG/1; UG/1
1 POWDER RESPIRATORY (INHALATION) 2 TIMES DAILY
Qty: 60 EACH | Refills: 11 | Status: SHIPPED | OUTPATIENT
Start: 2022-02-08 | End: 2023-02-01

## 2022-02-08 NOTE — PROGRESS NOTES
Subjective:   Patient ID:  Jacqueline Luna is a 66 y.o. female who presents for evaluation of No chief complaint on file.      HPI     2.8.   cw toprol plavix asa, lasix, statin   Has mild james did not follow with pulm   Did not get pft done, states has wheezing , pacheco   Left upper thigh claudication , not better when she leans on the cart   Also has back pain, going for xray lumbar spine   Also states was told her blood count was elevated and underwent evaluation at a cancer center but was told no cancer , KENNETH WHITESIDE NP   No chest pain       4.  64 yo female with pmhx below , not a smoker  S/p recent discharge two weeks ago she presented with an inferior stemi after 3 days of intermittent chest pains   No more chest pain since discharge, no groin issues   Reports Dyspnea at exertion which she relates to her copd , but states that sometimes she gets dyspnea at rest and believes this is new compared to before , ? 2/2 brilinta    Few wheezing episodes, uses albuterol PRN    Reports left leg claudication from thigh to calf with minimal walking   History reviewed. No pertinent past medical history.    Past Surgical History:   Procedure Laterality Date    CORONARY ANGIOGRAPHY N/A 3/12/2021    Procedure: ANGIOGRAM, CORONARY ARTERY;  Surgeon: Shruti Harrison MD;  Location: [facility] CATH LAB;  Service: Cardiology;  Laterality: N/A;    CORONARY ANGIOPLASTY WITH STENT PLACEMENT N/A 3/12/2021    Procedure: Angioplasty, Coronary Artery, With Stent Insertion;  Surgeon: Shruti Harrison MD;  Location: [facility] CATH LAB;  Service: Cardiology;  Laterality: N/A;    LEFT HEART CATHETERIZATION Left 3/12/2021    Procedure: CATHETERIZATION, HEART, LEFT;  Surgeon: Shruti Harrison MD;  Location: [facility] CATH LAB;  Service: Cardiology;  Laterality: Left;       Social History     Tobacco Use    Smoking status: Former Smoker     Quit date: 3/5/2021     Years since quittin.9   Substance Use Topics    Alcohol use: Not Currently     Drug use: Never       History reviewed. No pertinent family history.    Review of Systems   Constitutional: Negative for fever and malaise/fatigue.   HENT: Negative for sore throat.    Eyes: Negative for blurred vision.   Cardiovascular: Positive for claudication, dyspnea on exertion and leg swelling (TRACE). Negative for chest pain, cyanosis, irregular heartbeat, near-syncope, orthopnea, palpitations, paroxysmal nocturnal dyspnea and syncope.   Respiratory: Positive for cough and wheezing. Negative for hemoptysis.    Hematologic/Lymphatic: Negative for bleeding problem.   Skin: Negative for poor wound healing and rash.   Musculoskeletal: Negative for back pain and falls.   Gastrointestinal: Negative for abdominal pain.   Genitourinary: Negative for nocturia.   Neurological: Negative for dizziness, focal weakness, headaches, light-headedness and loss of balance.   Psychiatric/Behavioral: Negative for altered mental status and substance abuse.       Current Outpatient Medications on File Prior to Visit   Medication Sig    albuterol (PROVENTIL/VENTOLIN HFA) 90 mcg/actuation inhaler Ventolin HFA 90 mcg/actuation aerosol inhaler    amLODIPine (NORVASC) 10 MG tablet Take 10 mg by mouth once daily.    aspirin (ECOTRIN) 81 MG EC tablet Take 1 tablet (81 mg total) by mouth once daily.    atorvastatin (LIPITOR) 80 MG tablet Take 1 tablet (80 mg total) by mouth every evening.    clopidogreL (PLAVIX) 75 mg tablet Take 1 tablet (75 mg total) by mouth once daily.    colestipoL (COLESTID) 1 gram Tab colestipol 1 gram tablet    furosemide (LASIX) 20 MG tablet Take 1 tablet (20 mg total) by mouth daily as needed (swelling).    gabapentin (NEURONTIN) 300 MG capsule Take 1 capsule (300 mg total) by mouth 2 (two) times daily.    ibuprofen (ADVIL,MOTRIN) 800 MG tablet ibuprofen 800 mg tablet    lisinopriL-hydrochlorothiazide (PRINZIDE,ZESTORETIC) 20-12.5 mg per tablet Take 1 tablet by mouth once daily.    LORazepam (ATIVAN)  2 MG Tab Take 2 mg by mouth 2 (two) times daily as needed.    metoprolol succinate (TOPROL-XL) 25 MG 24 hr tablet Take 1 tablet (25 mg total) by mouth once daily.    QUEtiapine (SEROQUEL) 100 MG Tab Take 100 mg by mouth daily as needed.    sertraline (ZOLOFT) 100 MG tablet Take 100 mg by mouth once daily.     No current facility-administered medications on file prior to visit.       Objective:   Objective:  Wt Readings from Last 3 Encounters:   02/08/22 87 kg (191 lb 12.8 oz)   05/13/21 80.7 kg (177 lb 14.6 oz)   04/01/21 78.9 kg (173 lb 15.1 oz)     Temp Readings from Last 3 Encounters:   07/23/21 98.3 °F (36.8 °C) (Oral)   03/14/21 97.9 °F (36.6 °C) (Oral)     BP Readings from Last 3 Encounters:   02/08/22 136/72   07/23/21 112/78   05/13/21 122/68     Pulse Readings from Last 3 Encounters:   02/08/22 61   07/23/21 78   05/13/21 60       Physical Exam  Vitals reviewed.   Constitutional:       Appearance: She is well-developed.   HENT:      Head: Normocephalic and atraumatic.   Eyes:      General: No scleral icterus.     Conjunctiva/sclera: Conjunctivae normal.   Cardiovascular:      Rate and Rhythm: Normal rate and regular rhythm.      Pulses: Intact distal pulses.      Heart sounds: Normal heart sounds. No murmur heard.      Pulmonary:      Effort: No respiratory distress.      Breath sounds: No wheezing or rales.   Chest:      Chest wall: No tenderness.   Abdominal:      General: Bowel sounds are normal. There is no distension.      Palpations: Abdomen is soft.      Tenderness: There is no guarding.   Musculoskeletal:         General: Normal range of motion.      Cervical back: Normal range of motion and neck supple.   Skin:     General: Skin is warm.   Neurological:      Mental Status: She is alert and oriented to person, place, and time.         Lab Results   Component Value Date    CHOL 166 03/13/2021     Lab Results   Component Value Date    HDL 36 (L) 03/13/2021     Lab Results   Component Value Date     LDLCALC 99.0 03/13/2021     Lab Results   Component Value Date    TRIG 155 (H) 03/13/2021     Lab Results   Component Value Date    CHOLHDL 21.7 03/13/2021       Chemistry        Component Value Date/Time     07/23/2021 2235    K 4.3 07/23/2021 2235     07/23/2021 2235    CO2 23 07/23/2021 2235    BUN 19 07/23/2021 2235    CREATININE 0.8 07/23/2021 2235    GLU 96 07/23/2021 2235        Component Value Date/Time    CALCIUM 9.3 07/23/2021 2235    ALKPHOS 126 07/23/2021 2235    AST 20 07/23/2021 2235    ALT 24 07/23/2021 2235    BILITOT 0.2 07/23/2021 2235    ESTGFRAFRICA >60 07/23/2021 2235    EGFRNONAA >60 07/23/2021 2235          No results found for: TSH  No results found for: INR, PROTIME  Lab Results   Component Value Date    WBC 13.38 (H) 07/23/2021    HGB 11.4 (L) 07/23/2021    HCT 37.5 07/23/2021    MCV 82 07/23/2021     07/23/2021     BNP  @LABRCNTIP(BNP,BNPTRIAGEBLO)@  CrCl cannot be calculated (Patient's most recent lab result is older than the maximum 7 days allowed.).     Imaging:  ======  Results for orders placed during the hospital encounter of 03/12/21    Echo Color Flow Doppler? Yes    Interpretation Summary  · Concentric hypertrophy and normal systolic function. The estimated ejection fraction is 55%  · Mild tricuspid regurgitation.  · Normal left ventricular diastolic function.  · There are segmental left ventricular wall motion abnormalities.  · With normal right ventricular systolic function.  · Normal central venous pressure (3 mmHg).  · The estimated PA systolic pressure is 21 mmHg.    No results found for this or any previous visit.    No results found for this or any previous visit.    No results found for this or any previous visit.    No results found for this or any previous visit.    No valid procedures specified.    Diagnostic Results:  ECG: Reviewed    3/13/2021   Description of the findings of the procedure:   Left main patent   Lad patent, d1 patent   circ with 30%  proximal , om1 intermediate   Ramus is ostially 40%   rca is 100% thrombotic, on top of a diffusely diseased vessel      Plan   S/p successful PCI, 3.5x28 mm JOSE DAVID , post dilated to 4.0 mid stent   brilinta received 180 mg   Asa   lipitor       The ASCVD Risk score (Jailyn BELTRAN Jr., et al., 2013) failed to calculate for the following reasons:    The patient has a prior MI or stroke diagnosis    Assessment and Plan:   Claudication  -     CV Ultrasound doppler arterial legs bilat; Future  -     Ankle Brachial Indices (LINA); Future    Wheezing  -     Ambulatory referral/consult to Pulmonology; Future; Expected date: 02/15/2022  -     fluticasone-salmeterol diskus inhaler 100-50 mcg; Inhale 1 puff into the lungs 2 (two) times daily. Controller  Dispense: 60 each; Refill: 11    Shortness of breath    Left leg claudication    S/P coronary artery stent placement    Essential hypertension    Sinus bradycardia    Morbid obesity    continue with lifestyle modification   Statins , asa , Toprol   Switched in the past from brilinta to plavix, due to hx of wheezing and dyspnea, no major changes  She had mild RONALDO on sleep study but did not show up to her PFT's, still has wheezing, did not follow with pulm, needs a cpap but she states she felt claustrophobic with it   Will prescribe advair, states was told has copd in the past , patient with continued symptoms of dyspnea and wheezing   Reviewed all tests and above medical conditions with patient in detail and formulated treatment plan.  Risk factor modification discussed.   Cardiac low salt diet discussed.  Maintaining healthy weight and weight loss goals were discussed in clinic.  Follow with pulm   Follow spine xray results next visit     Follow up in 6 months

## 2022-02-17 ENCOUNTER — HOSPITAL ENCOUNTER (OUTPATIENT)
Dept: CARDIOLOGY | Facility: HOSPITAL | Age: 67
Discharge: HOME | End: 2022-02-17
Attending: INTERNAL MEDICINE
Payer: MEDICARE

## 2022-02-17 VITALS — HEIGHT: 61 IN | BODY MASS INDEX: 36.06 KG/M2 | WEIGHT: 191 LBS

## 2022-02-17 VITALS
WEIGHT: 191 LBS | SYSTOLIC BLOOD PRESSURE: 113 MMHG | DIASTOLIC BLOOD PRESSURE: 65 MMHG | BODY MASS INDEX: 36.06 KG/M2 | HEIGHT: 61 IN

## 2022-02-17 DIAGNOSIS — I73.9 CLAUDICATION: ICD-10-CM

## 2022-02-17 LAB
ABI POST MINUTES1: 2 MIN
ABI POST MINUTES2: 3 MIN
IMMEDIATE ARM BP: 136 MMHG
IMMEDIATE LEFT ABI: 0.73
IMMEDIATE LEFT TIBIAL: 99 MMHG
IMMEDIATE RIGHT ABI: 0.93
IMMEDIATE RIGHT TIBIAL: 127 MMHG
LEFT ABI: 1.08
LEFT ANT TIBIAL SYS PSV: 44 CM/S
LEFT ARM BP: 113 MMHG
LEFT CFA PSV: 128 CM/S
LEFT DORSALIS PEDIS: 118 MMHG
LEFT PERONEAL SYS PSV: 51 CM/S
LEFT POPLITEAL PSV: 67 CM/S
LEFT POST TIBIAL SYS PSV: 41 CM/S
LEFT POSTERIOR TIBIAL: 122 MMHG
LEFT PROFUNDA SYS PSV: 61 CM/S
LEFT SUPER FEMORAL DIST SYS PSV: 78 CM/S
LEFT SUPER FEMORAL MID SYS PSV: 95 CM/S
LEFT SUPER FEMORAL OSTIAL SYS PSV: 231 CM/S
LEFT SUPER FEMORAL PROX SYS PSV: 90 CM/S
LEFT TBI: 0.82
LEFT TIB/PER TRUNK SYS PSV: 130 CM/S
LEFT TOE PRESSURE: 93 MMHG
OHS CV LEFT LOWER EXTREMITY ABI (NO CALC): 1.08
OHS CV RIGHT ABI LOWER EXTREMITY (NO CALC): 1.04
POST1 ARM BP: 115 MMHG
POST1 LEFT ABI: 0.95
POST1 LEFT TIBIAL: 109 MMHG
POST1 RIGHT ABI: 1.03
POST1 RIGHT TIBIAL: 119 MMHG
POST2 ARM BP: 106 MMHG
POST2 LEFT ABI: 0.96
POST2 LEFT TIBIAL: 102 MMHG
POST2 RIGHT ABI: 0.99
POST2 RIGHT TIBIAL: 105 MMHG
RIGHT ABI: 1.04
RIGHT ANT TIBIAL SYS PSV: 98 CM/S
RIGHT ARM BP: 112 MMHG
RIGHT CFA PSV: 183 CM/S
RIGHT DORSALIS PEDIS: 118 MMHG
RIGHT PERONEAL SYS PSV: 69 CM/S
RIGHT POPLITEAL PSV: 64 CM/S
RIGHT POST TIBIAL SYS PSV: 127 CM/S
RIGHT POSTERIOR TIBIAL: 111 MMHG
RIGHT PROFUNDA SYS PSV: 184 CM/S
RIGHT SUPER FEMORAL DIST SYS PSV: 124 CM/S
RIGHT SUPER FEMORAL MID SYS PSV: 99 CM/S
RIGHT SUPER FEMORAL OSTIAL SYS PSV: 105 CM/S
RIGHT SUPER FEMORAL PROX SYS PSV: 116 CM/S
RIGHT TBI: 0.97
RIGHT TIB/PER TRUNK SYS PSV: 50 CM/S
RIGHT TOE PRESSURE: 110 MMHG
TREADMILL GRADE: 10 %
TREADMILL SPEED: 1.5 MPH

## 2022-02-17 PROCEDURE — 93924 LWR XTR VASC STDY BILAT: CPT

## 2022-02-17 PROCEDURE — 93925 CV US DOPPLER ARTERIAL LEGS BILATERAL (CUPID ONLY): ICD-10-PCS | Mod: 26,,, | Performed by: INTERNAL MEDICINE

## 2022-02-17 PROCEDURE — 93924 ANKLE BRACHIAL INDICES (ABI): ICD-10-PCS | Mod: 26,,, | Performed by: INTERNAL MEDICINE

## 2022-02-17 PROCEDURE — 93924 LWR XTR VASC STDY BILAT: CPT | Mod: 26,,, | Performed by: INTERNAL MEDICINE

## 2022-02-17 PROCEDURE — 93925 LOWER EXTREMITY STUDY: CPT

## 2022-02-17 PROCEDURE — 93925 LOWER EXTREMITY STUDY: CPT | Mod: 26,,, | Performed by: INTERNAL MEDICINE

## 2022-02-18 ENCOUNTER — TELEPHONE (OUTPATIENT)
Dept: CARDIOLOGY | Facility: CLINIC | Age: 67
End: 2022-02-18
Payer: MEDICAID

## 2022-02-18 DIAGNOSIS — I73.9 PERIPHERAL VASCULAR DISEASE, UNSPECIFIED: ICD-10-CM

## 2022-02-18 NOTE — TELEPHONE ENCOUNTER
Patient called left voicemail, drop in left LINA after exercise   Will get CTA aorto iliac to rule out left RAMA stenosis

## 2022-02-25 DIAGNOSIS — I10 ESSENTIAL HYPERTENSION: Primary | ICD-10-CM

## 2022-02-25 DIAGNOSIS — I73.9 PAD (PERIPHERAL ARTERY DISEASE): ICD-10-CM

## 2022-03-01 ENCOUNTER — HOSPITAL ENCOUNTER (OUTPATIENT)
Dept: RADIOLOGY | Facility: HOSPITAL | Age: 67
Discharge: HOME | End: 2022-03-01
Attending: INTERNAL MEDICINE
Payer: MEDICARE

## 2022-03-01 DIAGNOSIS — I73.9 PERIPHERAL VASCULAR DISEASE, UNSPECIFIED: ICD-10-CM

## 2022-03-01 PROCEDURE — 25500020 PHARM REV CODE 255: Performed by: INTERNAL MEDICINE

## 2022-03-01 PROCEDURE — 75635 CT ANGIO ABDOMINAL ARTERIES: CPT | Mod: TC

## 2022-03-01 RX ADMIN — IOHEXOL 125 ML: 350 INJECTION, SOLUTION INTRAVENOUS at 05:03

## 2022-03-07 ENCOUNTER — TELEPHONE (OUTPATIENT)
Dept: CARDIOLOGY | Facility: CLINIC | Age: 67
End: 2022-03-07
Payer: MEDICAID

## 2022-03-07 NOTE — TELEPHONE ENCOUNTER
Spoke with pt in regards to results, Pt is scheduled for a f/u to review results.     ----- Message from Barbara Rico sent at 3/7/2022 12:11 PM CST -----  Pt is calling in regards to getting test results. Pt can be reached at [phone] ([place]) or [phone]

## 2022-06-02 DIAGNOSIS — Z95.5 S/P CORONARY ARTERY STENT PLACEMENT: ICD-10-CM

## 2022-06-02 RX ORDER — METOPROLOL SUCCINATE 25 MG/1
25 TABLET, EXTENDED RELEASE ORAL DAILY
Qty: 90 TABLET | Refills: 1 | Status: SHIPPED | OUTPATIENT
Start: 2022-06-02 | End: 2022-10-19

## 2022-06-02 NOTE — TELEPHONE ENCOUNTER
----- Message from Hiwot Jon sent at 6/2/2022 10:42 AM CDT -----  Regarding: new rx  Contact: Jannie chacko  Needs a new rx for Metoprolol 25mg tab.   [phone]

## 2022-09-01 ENCOUNTER — OFFICE VISIT (OUTPATIENT)
Dept: CARDIOLOGY | Facility: CLINIC | Age: 67
End: 2022-09-01
Payer: MEDICARE

## 2022-09-01 DIAGNOSIS — Z95.5 S/P CORONARY ARTERY STENT PLACEMENT: Primary | ICD-10-CM

## 2022-09-01 DIAGNOSIS — E78.2 MIXED HYPERLIPIDEMIA: ICD-10-CM

## 2022-09-01 DIAGNOSIS — I10 ESSENTIAL HYPERTENSION: ICD-10-CM

## 2022-09-01 DIAGNOSIS — E66.01 MORBID OBESITY: ICD-10-CM

## 2022-09-01 DIAGNOSIS — I73.9 CLAUDICATION: ICD-10-CM

## 2022-09-01 PROCEDURE — 99214 PR OFFICE/OUTPT VISIT, EST, LEVL IV, 30-39 MIN: ICD-10-PCS | Mod: 95,,, | Performed by: INTERNAL MEDICINE

## 2022-09-01 PROCEDURE — 4010F ACE/ARB THERAPY RXD/TAKEN: CPT | Mod: CPTII,95,, | Performed by: INTERNAL MEDICINE

## 2022-09-01 PROCEDURE — 99214 OFFICE O/P EST MOD 30 MIN: CPT | Mod: 95,,, | Performed by: INTERNAL MEDICINE

## 2022-09-01 PROCEDURE — 4010F PR ACE/ARB THEARPY RXD/TAKEN: ICD-10-PCS | Mod: CPTII,95,, | Performed by: INTERNAL MEDICINE

## 2022-09-01 RX ORDER — ATORVASTATIN CALCIUM 80 MG/1
80 TABLET, FILM COATED ORAL NIGHTLY
Qty: 30 TABLET | Refills: 11 | Status: SHIPPED | OUTPATIENT
Start: 2022-09-01 | End: 2024-03-19

## 2022-09-01 NOTE — PROGRESS NOTES
Subjective:   Patient ID:  Jacqueline Luna is a 66 y.o. female who presents for evaluation of No chief complaint on file.      HPI   9.1.2022 this is a virtual visit.    Patient has multiple noncardiac complaints.  She complains of stiffness to her and and joints.    She denies any chest pain or dyspnea on exertion.    She complains of fatigue as well.    There is no lower extremity swelling.  Blood pressure under control.    Compliant with medications.    No bleeding.  She is still taking aspirin and Plavix since her stent.  Will continue to aspirin 81 mg and stop the Plavix  Her LINA was normal at rest but with exercise mildly decreased however ultrasound lower extremity was normal.  His CTA a runoff showed moderate disease in the 50% range iliac and SFA is.  A follow-up was made with the patient she denies any more symptoms to her left thigh.    2.8.2022   cw toprol plavix asa, lasix, statin   Has mild james did not follow with pulm   Did not get pft done, states has wheezing , pacheco   Left upper thigh claudication , not better when she leans on the cart   Also has back pain, going for xray lumbar spine   Also states was told her blood count was elevated and underwent evaluation at a cancer center but was told no cancer , KENNETH WHITESIDE NP   No chest pain       4.2021  66 yo female with pmhx below , not a smoker  S/p recent discharge two weeks ago she presented with an inferior stemi after 3 days of intermittent chest pains   No more chest pain since discharge, no groin issues   Reports Dyspnea at exertion which she relates to her copd , but states that sometimes she gets dyspnea at rest and believes this is new compared to before , ? 2/2 brilinta    Few wheezing episodes, uses albuterol PRN    Reports left leg claudication from thigh to calf with minimal walking   No past medical history on file.    Past Surgical History:   Procedure Laterality Date    CORONARY ANGIOGRAPHY N/A 3/12/2021    Procedure: ANGIOGRAM,  CORONARY ARTERY;  Surgeon: Shruti Harrison MD;  Location: [facility] CATH LAB;  Service: Cardiology;  Laterality: N/A;    CORONARY ANGIOPLASTY WITH STENT PLACEMENT N/A 3/12/2021    Procedure: Angioplasty, Coronary Artery, With Stent Insertion;  Surgeon: Shruti Harrison MD;  Location: [facility] CATH LAB;  Service: Cardiology;  Laterality: N/A;    LEFT HEART CATHETERIZATION Left 3/12/2021    Procedure: CATHETERIZATION, HEART, LEFT;  Surgeon: Shruti Harrison MD;  Location: [facility] CATH LAB;  Service: Cardiology;  Laterality: Left;       Social History     Tobacco Use    Smoking status: Former     Types: Cigarettes     Quit date: 3/5/2021     Years since quittin.4   Substance Use Topics    Alcohol use: Not Currently    Drug use: Never       No family history on file.    Review of Systems   Constitutional: Negative for fever and malaise/fatigue.   HENT:  Negative for sore throat.    Eyes:  Negative for blurred vision.   Cardiovascular:  Positive for claudication, dyspnea on exertion and leg swelling (TRACE). Negative for chest pain, cyanosis, irregular heartbeat, near-syncope, orthopnea, palpitations, paroxysmal nocturnal dyspnea and syncope.   Respiratory:  Positive for cough and wheezing. Negative for hemoptysis.    Hematologic/Lymphatic: Negative for bleeding problem.   Skin:  Negative for poor wound healing and rash.   Musculoskeletal:  Negative for back pain and falls.   Gastrointestinal:  Negative for abdominal pain.   Genitourinary:  Negative for nocturia.   Neurological:  Negative for dizziness, focal weakness, headaches, light-headedness and loss of balance.   Psychiatric/Behavioral:  Negative for altered mental status and substance abuse.      Current Outpatient Medications on File Prior to Visit   Medication Sig    albuterol (PROVENTIL/VENTOLIN HFA) 90 mcg/actuation inhaler Ventolin HFA 90 mcg/actuation aerosol inhaler    amLODIPine (NORVASC) 10 MG tablet Take 10 mg by mouth once daily.    aspirin (ECOTRIN) 81 MG  EC tablet Take 1 tablet (81 mg total) by mouth once daily.    clopidogreL (PLAVIX) 75 mg tablet TAKE 1 TABLET EVERY DAY    fluticasone-salmeterol diskus inhaler 100-50 mcg Inhale 1 puff into the lungs 2 (two) times daily. Controller    furosemide (LASIX) 20 MG tablet Take 1 tablet (20 mg total) by mouth daily as needed (swelling).    gabapentin (NEURONTIN) 300 MG capsule Take 1 capsule (300 mg total) by mouth 2 (two) times daily.    ibuprofen (ADVIL,MOTRIN) 800 MG tablet ibuprofen 800 mg tablet    lisinopriL-hydrochlorothiazide (PRINZIDE,ZESTORETIC) 20-12.5 mg per tablet Take 1 tablet by mouth once daily.    LORazepam (ATIVAN) 2 MG Tab Take 2 mg by mouth 2 (two) times daily as needed.    metoprolol succinate (TOPROL-XL) 25 MG 24 hr tablet Take 1 tablet (25 mg total) by mouth once daily.    QUEtiapine (SEROQUEL) 100 MG Tab Take 100 mg by mouth daily as needed.    sertraline (ZOLOFT) 100 MG tablet Take 100 mg by mouth once daily.    [DISCONTINUED] atorvastatin (LIPITOR) 80 MG tablet Take 1 tablet (80 mg total) by mouth every evening.    [DISCONTINUED] colestipoL (COLESTID) 1 gram Tab colestipol 1 gram tablet     No current facility-administered medications on file prior to visit.       Objective:   Objective:  Wt Readings from Last 3 Encounters:   02/17/22 86.6 kg (191 lb)   02/17/22 86.6 kg (191 lb)   02/08/22 87 kg (191 lb 12.8 oz)     Temp Readings from Last 3 Encounters:   07/23/21 98.3 °F (36.8 °C) (Oral)   03/14/21 97.9 °F (36.6 °C) (Oral)     BP Readings from Last 3 Encounters:   02/17/22 113/65   02/08/22 136/72   07/23/21 112/78     Pulse Readings from Last 3 Encounters:   02/08/22 61   07/23/21 78   05/13/21 60           Lab Results   Component Value Date    CHOL 166 03/13/2021     Lab Results   Component Value Date    HDL 36 (L) 03/13/2021     Lab Results   Component Value Date    LDLCALC 99.0 03/13/2021     Lab Results   Component Value Date    TRIG 155 (H) 03/13/2021     Lab Results   Component Value Date     CHOLHDL 21.7 03/13/2021       Chemistry        Component Value Date/Time     07/23/2021 2235    K 4.3 07/23/2021 2235     07/23/2021 2235    CO2 23 07/23/2021 2235    BUN 19 07/23/2021 2235    CREATININE 0.9 03/01/2022 1550    GLU 96 07/23/2021 2235        Component Value Date/Time    CALCIUM 9.3 07/23/2021 2235    ALKPHOS 126 07/23/2021 2235    AST 20 07/23/2021 2235    ALT 24 07/23/2021 2235    BILITOT 0.2 07/23/2021 2235    ESTGFRAFRICA >60 03/01/2022 1550    EGFRNONAA >60 03/01/2022 1550          No results found for: TSH  No results found for: INR, PROTIME  Lab Results   Component Value Date    WBC 13.38 (H) 07/23/2021    HGB 11.4 (L) 07/23/2021    HCT 37.5 07/23/2021    MCV 82 07/23/2021     07/23/2021     BNP  @LABRCNTIP(BNP,BNPTRIAGEBLO)@  CrCl cannot be calculated (Patient's most recent lab result is older than the maximum 7 days allowed.).     Imaging:  ======  Results for orders placed during the hospital encounter of 03/12/21    Echo Color Flow Doppler? Yes    Interpretation Summary  · Concentric hypertrophy and normal systolic function. The estimated ejection fraction is 55%  · Mild tricuspid regurgitation.  · Normal left ventricular diastolic function.  · There are segmental left ventricular wall motion abnormalities.  · With normal right ventricular systolic function.  · Normal central venous pressure (3 mmHg).  · The estimated PA systolic pressure is 21 mmHg.    No results found for this or any previous visit.    No results found for this or any previous visit.    No results found for this or any previous visit.    No results found for this or any previous visit.    No valid procedures specified.    Diagnostic Results:  ECG: Reviewed    3/13/2021   Description of the findings of the procedure:   Left main patent   Lad patent, d1 patent   circ with 30% proximal , om1 intermediate   Ramus is ostially 40%   rca is 100% thrombotic, on top of a diffusely diseased vessel      Plan    S/p successful PCI, 3.5x28 mm JOSE DAVID , post dilated to 4.0 mid stent   brilinta received 180 mg   Asa   lipitor       The ASCVD Risk score (Mac DK, et al., 2019) failed to calculate for the following reasons:    The patient has a prior MI or stroke diagnosis    Assessment and Plan:   S/P coronary artery stent placement  -     atorvastatin (LIPITOR) 80 MG tablet; Take 1 tablet (80 mg total) by mouth every evening.  Dispense: 30 tablet; Refill: 11    Essential hypertension    Mixed hyperlipidemia    Morbid obesity    Claudication  Comments:  Resolved.  Had normal LINA at rest with mild decrease with exercise.  And CTA with runoff showed disease in the 50%-range.  See CTA results    Statins , asa , Toprol   DC Plavix.  Continue with aspirin monotherapy.  She had mild RONALDO on sleep study but states that she was claustrophobic with the CPAP and made her symptoms worse.    I did refer her to Pulmonary in 10 last time did not make an appointment.  She was supposed to get PFTs after her last appointment with Pulmonary.  States feels better after I represcribed her Advair.  Reviewed all tests and above medical conditions with patient in detail and formulated treatment plan.  Risk factor modification discussed.   Cardiac low salt diet discussed.  Maintaining healthy weight and weight loss goals were discussed in clinic.  Follow with pulm       Follow up in 6 months

## 2022-11-17 ENCOUNTER — HOSPITAL ENCOUNTER (EMERGENCY)
Facility: HOSPITAL | Age: 67
Discharge: HOME | End: 2022-11-17
Attending: EMERGENCY MEDICINE
Payer: MEDICARE

## 2022-11-17 VITALS
OXYGEN SATURATION: 98 % | TEMPERATURE: 97 F | SYSTOLIC BLOOD PRESSURE: 129 MMHG | DIASTOLIC BLOOD PRESSURE: 98 MMHG | HEIGHT: 61 IN | BODY MASS INDEX: 35.35 KG/M2 | HEART RATE: 54 BPM | RESPIRATION RATE: 24 BRPM | WEIGHT: 187.25 LBS

## 2022-11-17 DIAGNOSIS — R07.9 CHEST PAIN: Primary | ICD-10-CM

## 2022-11-17 LAB
ALBUMIN SERPL BCP-MCNC: 3.6 G/DL (ref 3.5–5.2)
ALP SERPL-CCNC: 128 U/L (ref 55–135)
ALT SERPL W/O P-5'-P-CCNC: 12 U/L (ref 10–44)
ANION GAP SERPL CALC-SCNC: 10 MMOL/L (ref 8–16)
AST SERPL-CCNC: 13 U/L (ref 10–40)
BASOPHILS # BLD AUTO: 0.06 K/UL (ref 0–0.2)
BASOPHILS NFR BLD: 0.4 % (ref 0–1.9)
BILIRUB SERPL-MCNC: 0.2 MG/DL (ref 0.1–1)
BNP SERPL-MCNC: 13 PG/ML (ref 0–99)
BUN SERPL-MCNC: 20 MG/DL (ref 8–23)
CALCIUM SERPL-MCNC: 9.7 MG/DL (ref 8.7–10.5)
CHLORIDE SERPL-SCNC: 103 MMOL/L (ref 95–110)
CO2 SERPL-SCNC: 27 MMOL/L (ref 23–29)
CREAT SERPL-MCNC: 0.9 MG/DL (ref 0.5–1.4)
DIFFERENTIAL METHOD: ABNORMAL
EOSINOPHIL # BLD AUTO: 0.4 K/UL (ref 0–0.5)
EOSINOPHIL NFR BLD: 3 % (ref 0–8)
ERYTHROCYTE [DISTWIDTH] IN BLOOD BY AUTOMATED COUNT: 14.4 % (ref 11.5–14.5)
EST. GFR  (NO RACE VARIABLE): >60 ML/MIN/1.73 M^2
GLUCOSE SERPL-MCNC: 94 MG/DL (ref 70–110)
HCT VFR BLD AUTO: 38.8 % (ref 37–48.5)
HCV AB SERPL QL IA: NEGATIVE
HEP C VIRUS HOLD SPECIMEN: NORMAL
HGB BLD-MCNC: 12.4 G/DL (ref 12–16)
HIV 1+2 AB+HIV1 P24 AG SERPL QL IA: NEGATIVE
IMM GRANULOCYTES # BLD AUTO: 0.06 K/UL (ref 0–0.04)
IMM GRANULOCYTES NFR BLD AUTO: 0.4 % (ref 0–0.5)
LYMPHOCYTES # BLD AUTO: 3.8 K/UL (ref 1–4.8)
LYMPHOCYTES NFR BLD: 25.6 % (ref 18–48)
MCH RBC QN AUTO: 25.1 PG (ref 27–31)
MCHC RBC AUTO-ENTMCNC: 32 G/DL (ref 32–36)
MCV RBC AUTO: 78 FL (ref 82–98)
MONOCYTES # BLD AUTO: 1 K/UL (ref 0.3–1)
MONOCYTES NFR BLD: 6.7 % (ref 4–15)
NEUTROPHILS # BLD AUTO: 9.5 K/UL (ref 1.8–7.7)
NEUTROPHILS NFR BLD: 63.9 % (ref 38–73)
NRBC BLD-RTO: 0 /100 WBC
PLATELET # BLD AUTO: 338 K/UL (ref 150–450)
PMV BLD AUTO: 9.6 FL (ref 9.2–12.9)
POTASSIUM SERPL-SCNC: 3.4 MMOL/L (ref 3.5–5.1)
PROT SERPL-MCNC: 7.6 G/DL (ref 6–8.4)
RBC # BLD AUTO: 4.95 M/UL (ref 4–5.4)
SODIUM SERPL-SCNC: 140 MMOL/L (ref 136–145)
TROPONIN I SERPL DL<=0.01 NG/ML-MCNC: 0.01 NG/ML (ref 0–0.03)
WBC # BLD AUTO: 14.87 K/UL (ref 3.9–12.7)

## 2022-11-17 PROCEDURE — 87389 HIV-1 AG W/HIV-1&-2 AB AG IA: CPT | Performed by: REGISTERED NURSE

## 2022-11-17 PROCEDURE — 93010 ELECTROCARDIOGRAM REPORT: CPT | Mod: ,,, | Performed by: INTERNAL MEDICINE

## 2022-11-17 PROCEDURE — 85025 COMPLETE CBC W/AUTO DIFF WBC: CPT | Performed by: EMERGENCY MEDICINE

## 2022-11-17 PROCEDURE — 86803 HEPATITIS C AB TEST: CPT | Performed by: REGISTERED NURSE

## 2022-11-17 PROCEDURE — 93010 EKG 12-LEAD: ICD-10-PCS | Mod: ,,, | Performed by: INTERNAL MEDICINE

## 2022-11-17 PROCEDURE — 93005 ELECTROCARDIOGRAM TRACING: CPT

## 2022-11-17 PROCEDURE — 84484 ASSAY OF TROPONIN QUANT: CPT | Performed by: EMERGENCY MEDICINE

## 2022-11-17 PROCEDURE — 80053 COMPREHEN METABOLIC PANEL: CPT | Performed by: EMERGENCY MEDICINE

## 2022-11-17 PROCEDURE — 99285 EMERGENCY DEPT VISIT HI MDM: CPT | Mod: 25

## 2022-11-17 PROCEDURE — 83880 ASSAY OF NATRIURETIC PEPTIDE: CPT | Performed by: EMERGENCY MEDICINE

## 2022-11-17 NOTE — ED PROVIDER NOTES
"SCRIBE #1 NOTE: I, Abby Theron, am scribing for, and in the presence of, Irish Cochran MD. I have scribed the entire note.       History     Chief Complaint   Patient presents with    Chest Pain     Mid-sternal CP and SOB beginning this am; hx of MI     Review of patient's allergies indicates:  No Known Allergies      History of Present Illness     HPI    11/17/2022, 8:59 AM  History obtained from the patient      History of Present Illness: Jacqueline Luna is a 67 y.o. female patient with PMHx of MI who presents to the Emergency Department for evaluation of CP which onset gradually yesterday evening. Pt states the sxs she is feeling are similar to the sxs she had with a past MI. Pt describes pain as "stickyness" and heaviness. Symptoms are constant and moderate in severity. No mitigating or exacerbating factors reported. Associated sxs include SOB, nausea, diaphoresis, and diarrhea. Patient denies any fever, chills, palpitations, HA, dizziness, and all other sxs at this time.  No further complaints or concerns at this time.       Arrival mode: Personal vehicle      PCP: Jasiel Miller MD        Past Medical History:  No past medical history on file.    Past Surgical History:  Past Surgical History:   Procedure Laterality Date    CORONARY ANGIOGRAPHY N/A 3/12/2021    Procedure: ANGIOGRAM, CORONARY ARTERY;  Surgeon: Shruti Harrison MD;  Location: [facility] CATH LAB;  Service: Cardiology;  Laterality: N/A;    CORONARY ANGIOPLASTY WITH STENT PLACEMENT N/A 3/12/2021    Procedure: Angioplasty, Coronary Artery, With Stent Insertion;  Surgeon: Shruti Harrison MD;  Location: [facility] CATH LAB;  Service: Cardiology;  Laterality: N/A;    LEFT HEART CATHETERIZATION Left 3/12/2021    Procedure: CATHETERIZATION, HEART, LEFT;  Surgeon: Shruti Harrison MD;  Location: [facility] CATH LAB;  Service: Cardiology;  Laterality: Left;         Family History:  No family history on file.    Social History:  Social History     Tobacco Use    " Smoking status: Former     Types: Cigarettes     Quit date: 3/5/2021     Years since quittin.7    Smokeless tobacco: Not on file   Substance and Sexual Activity    Alcohol use: Not Currently    Drug use: Never    Sexual activity: Not on file        Review of Systems     Review of Systems   Constitutional:  Positive for diaphoresis. Negative for chills and fever.   HENT:  Negative for sore throat.    Respiratory:  Positive for shortness of breath.    Cardiovascular:  Positive for chest pain. Negative for palpitations.   Gastrointestinal:  Positive for diarrhea and nausea. Negative for vomiting.   Genitourinary:  Negative for dysuria.   Musculoskeletal:  Negative for back pain.   Skin:  Negative for rash.   Neurological:  Negative for dizziness, weakness and headaches.   Hematological:  Does not bruise/bleed easily.   All other systems reviewed and are negative.     Physical Exam     Initial Vitals [22 0842]   BP Pulse Resp Temp SpO2   (!) 156/73 (!) 58 (!) 24 97.3 °F (36.3 °C) 95 %      MAP       --          Physical Exam  Nursing Notes and Vital Signs Reviewed.  Constitutional: Patient is in no apparent distress. Well-developed and well-nourished.  Head: Atraumatic. Normocephalic.  Eyes: PERRL. EOM intact. Conjunctivae are not pale. No scleral icterus.  ENT: Mucous membranes are moist. Oropharynx is clear and symmetric.    Neck: Supple. Full ROM. No lymphadenopathy.  Cardiovascular: Regular rate. Regular rhythm. No murmurs, rubs, or gallops. Distal pulses are 2+ and symmetric.  Pulmonary/Chest: No respiratory distress. Clear to auscultation bilaterally. No wheezing or rales.  Abdominal: Soft and non-distended.  There is no tenderness.  No rebound, guarding, or rigidity. Good bowel sounds.  Genitourinary: No CVA tenderness  Musculoskeletal: Moves all extremities. No obvious deformities. No edema.   Skin: Warm and dry.  Neurological:  Alert, awake, and appropriate.  Normal speech.  No acute focal  "neurological deficits are appreciated.  Psychiatric: Normal affect. Good eye contact. Appropriate in content.     ED Course   Procedures  ED Vital Signs:  Vitals:    11/17/22 0842 11/17/22 0852 11/17/22 0902 11/17/22 1018   BP: (!) 156/73  138/69 (!) 129/98   Pulse: (!) 58 (!) 53 (!) 53 (!) 54   Resp: (!) 24      Temp: 97.3 °F (36.3 °C)      TempSrc: Oral      SpO2: 95%  97% 98%   Weight: 84.9 kg (187 lb 4.5 oz)      Height: 5' 1" (1.549 m)          Abnormal Lab Results:  Labs Reviewed   CBC W/ AUTO DIFFERENTIAL - Abnormal; Notable for the following components:       Result Value    WBC 14.87 (*)     MCV 78 (*)     MCH 25.1 (*)     Gran # (ANC) 9.5 (*)     Immature Grans (Abs) 0.06 (*)     All other components within normal limits    Narrative:     Release to patient->Immediate   COMPREHENSIVE METABOLIC PANEL - Abnormal; Notable for the following components:    Potassium 3.4 (*)     All other components within normal limits    Narrative:     Release to patient->Immediate   HIV 1 / 2 ANTIBODY    Narrative:     Release to patient->Immediate   HEPATITIS C ANTIBODY    Narrative:     Release to patient->Immediate   HEP C VIRUS HOLD SPECIMEN    Narrative:     Release to patient->Immediate   TROPONIN I    Narrative:     Release to patient->Immediate   B-TYPE NATRIURETIC PEPTIDE    Narrative:     Release to patient->Immediate   TROPONIN I        All Lab Results:  Results for orders placed or performed during the hospital encounter of 11/17/22   HIV 1/2 Ag/Ab (4th Gen)   Result Value Ref Range    HIV 1/2 Ag/Ab Negative Negative   Hepatitis C Antibody   Result Value Ref Range    Hepatitis C Ab Negative Negative   HCV Virus Hold Specimen   Result Value Ref Range    HEP C Virus Hold Specimen Hold for HCV sendout    CBC auto differential   Result Value Ref Range    WBC 14.87 (H) 3.90 - 12.70 K/uL    RBC 4.95 4.00 - 5.40 M/uL    Hemoglobin 12.4 12.0 - 16.0 g/dL    Hematocrit 38.8 37.0 - 48.5 %    MCV 78 (L) 82 - 98 fL    MCH 25.1 " (L) 27.0 - 31.0 pg    MCHC 32.0 32.0 - 36.0 g/dL    RDW 14.4 11.5 - 14.5 %    Platelets 338 150 - 450 K/uL    MPV 9.6 9.2 - 12.9 fL    Immature Granulocytes 0.4 0.0 - 0.5 %    Gran # (ANC) 9.5 (H) 1.8 - 7.7 K/uL    Immature Grans (Abs) 0.06 (H) 0.00 - 0.04 K/uL    Lymph # 3.8 1.0 - 4.8 K/uL    Mono # 1.0 0.3 - 1.0 K/uL    Eos # 0.4 0.0 - 0.5 K/uL    Baso # 0.06 0.00 - 0.20 K/uL    nRBC 0 0 /100 WBC    Gran % 63.9 38.0 - 73.0 %    Lymph % 25.6 18.0 - 48.0 %    Mono % 6.7 4.0 - 15.0 %    Eosinophil % 3.0 0.0 - 8.0 %    Basophil % 0.4 0.0 - 1.9 %    Differential Method Automated    Comprehensive metabolic panel   Result Value Ref Range    Sodium 140 136 - 145 mmol/L    Potassium 3.4 (L) 3.5 - 5.1 mmol/L    Chloride 103 95 - 110 mmol/L    CO2 27 23 - 29 mmol/L    Glucose 94 70 - 110 mg/dL    BUN 20 8 - 23 mg/dL    Creatinine 0.9 0.5 - 1.4 mg/dL    Calcium 9.7 8.7 - 10.5 mg/dL    Total Protein 7.6 6.0 - 8.4 g/dL    Albumin 3.6 3.5 - 5.2 g/dL    Total Bilirubin 0.2 0.1 - 1.0 mg/dL    Alkaline Phosphatase 128 55 - 135 U/L    AST 13 10 - 40 U/L    ALT 12 10 - 44 U/L    Anion Gap 10 8 - 16 mmol/L    eGFR >60 >60 mL/min/1.73 m^2   Troponin I #1   Result Value Ref Range    Troponin I 0.006 0.000 - 0.026 ng/mL   BNP   Result Value Ref Range    BNP 13 0 - 99 pg/mL         Imaging Results:  Imaging Results              X-Ray Chest PA And Lateral (Final result)  Result time 11/17/22 09:26:44      Final result by Salomón Gomez MD (11/17/22 09:26:44)                   Impression:      No acute process seen.      Electronically signed by: Salomón Gomez MD  Date:    11/17/2022  Time:    09:26               Narrative:    EXAMINATION:  XR CHEST PA AND LATERAL    CLINICAL HISTORY:  Chest Pain;    COMPARISON:  07/23/2021    FINDINGS:  Cardiac silhouette is normal.  The lungs demonstrate no evidence of active disease.  No evidence of pleural effusion or pneumothorax.  Bones appear intact.  Aorta demonstrates atherosclerotic disease.                                        The EKG was ordered, reviewed, and independently interpreted by the ED provider.  Interpretation time: 8:38  Rate: 54 BPM  Rhythm: sinus bradycardia  Interpretation: Low Voltage QRS. Possible lateral infarct, age undetermined. Inferior infarct, age undetermined. No STEMI.  When compared to EKG performed 07/23/21, there are no significant changes.           The Emergency Provider reviewed the vital signs and test results, which are outlined above.     ED Discussion       10:14 AM: Reassessed pt at this time, no ECG changes, trop completely normal, symptoms intermittent since last night also has GI symptoms, don't suspect ACS, stable for discharge home with continued monitoring of symptoms, return precautions given.  Discussed with pt all pertinent ED information and results. Discussed pt dx and plan of tx. Gave pt all f/u and return to the ED instructions. All questions and concerns were addressed at this time. Pt expresses understanding of information and instructions, and is comfortable with plan to discharge. Pt is stable for discharge.    I discussed with patient and/or family/caretaker that evaluation in the ED does not suggest any emergent or life threatening medical conditions requiring immediate intervention beyond what was provided in the ED, and I believe patient is safe for discharge.  Regardless, an unremarkable evaluation in the ED does not preclude the development or presence of a serious of life threatening condition. As such, patient was instructed to return immediately for any worsening or change in current symptoms.         Medical Decision Making:   Clinical Tests:   Lab Tests: Ordered and Reviewed  Radiological Study: Ordered and Reviewed  Medical Tests: Ordered and Reviewed         ED Medication(s):  Medications - No data to display    Discharge Medication List as of 11/17/2022 10:06 AM           Follow-up Information       Jasiel Miller MD. Schedule an  appointment as soon as possible for a visit in 1 day.    Specialty: Family Medicine  Contact information:  [address] [facility] [address]  [phone]               [facility] CARDIOLOGY. Schedule an appointment as soon as possible for a visit in 2 days.    Specialty: Cardiology  Why: Return to the Emergency Room, If symptoms worsen  Contact information:  [address]  [facility] [address]  [phone]                               Scribe Attestation:   Scribe #1: I performed the above scribed service and the documentation accurately describes the services I performed. I attest to the accuracy of the note.     Attending:   Physician Attestation Statement for Scribe #1: I, Irish Cochran MD, personally performed the services described in this documentation, as scribed by Abby Crump, in my presence, and it is both accurate and complete.           Clinical Impression       ICD-10-CM ICD-9-CM   1. Chest pain  R07.9 786.50       Disposition:   Disposition: Discharged  Condition: Stable       Irish Cochran MD  11/17/22 1044

## 2023-01-17 ENCOUNTER — HOSPITAL ENCOUNTER (EMERGENCY)
Facility: HOSPITAL | Age: 68
Discharge: HOME | End: 2023-01-17
Attending: EMERGENCY MEDICINE
Payer: MEDICARE

## 2023-01-17 VITALS
HEART RATE: 60 BPM | OXYGEN SATURATION: 97 % | TEMPERATURE: 98 F | SYSTOLIC BLOOD PRESSURE: 127 MMHG | RESPIRATION RATE: 18 BRPM | BODY MASS INDEX: 35.33 KG/M2 | WEIGHT: 187 LBS | DIASTOLIC BLOOD PRESSURE: 81 MMHG

## 2023-01-17 DIAGNOSIS — R10.9 LEFT FLANK PAIN: Primary | ICD-10-CM

## 2023-01-17 DIAGNOSIS — N12 PYELONEPHRITIS: ICD-10-CM

## 2023-01-17 LAB
ALBUMIN SERPL BCP-MCNC: 3.9 G/DL (ref 3.5–5.2)
ALP SERPL-CCNC: 145 U/L (ref 55–135)
ALT SERPL W/O P-5'-P-CCNC: 13 U/L (ref 10–44)
ANION GAP SERPL CALC-SCNC: 13 MMOL/L (ref 8–16)
AST SERPL-CCNC: 17 U/L (ref 10–40)
BACTERIA #/AREA URNS HPF: ABNORMAL /HPF
BASOPHILS # BLD AUTO: 0.06 K/UL (ref 0–0.2)
BASOPHILS NFR BLD: 0.5 % (ref 0–1.9)
BILIRUB SERPL-MCNC: 0.4 MG/DL (ref 0.1–1)
BILIRUB UR QL STRIP: NEGATIVE
BUN SERPL-MCNC: 12 MG/DL (ref 8–23)
CALCIUM SERPL-MCNC: 9.6 MG/DL (ref 8.7–10.5)
CHLORIDE SERPL-SCNC: 106 MMOL/L (ref 95–110)
CLARITY UR: ABNORMAL
CO2 SERPL-SCNC: 22 MMOL/L (ref 23–29)
COLOR UR: YELLOW
CREAT SERPL-MCNC: 0.9 MG/DL (ref 0.5–1.4)
DIFFERENTIAL METHOD: ABNORMAL
EOSINOPHIL # BLD AUTO: 0.4 K/UL (ref 0–0.5)
EOSINOPHIL NFR BLD: 3.4 % (ref 0–8)
ERYTHROCYTE [DISTWIDTH] IN BLOOD BY AUTOMATED COUNT: 15.7 % (ref 11.5–14.5)
EST. GFR  (NO RACE VARIABLE): >60 ML/MIN/1.73 M^2
GLUCOSE SERPL-MCNC: 97 MG/DL (ref 70–110)
GLUCOSE UR QL STRIP: NEGATIVE
HCT VFR BLD AUTO: 39.8 % (ref 37–48.5)
HGB BLD-MCNC: 12.2 G/DL (ref 12–16)
HGB UR QL STRIP: NEGATIVE
IMM GRANULOCYTES # BLD AUTO: 0.04 K/UL (ref 0–0.04)
IMM GRANULOCYTES NFR BLD AUTO: 0.3 % (ref 0–0.5)
KETONES UR QL STRIP: NEGATIVE
LEUKOCYTE ESTERASE UR QL STRIP: ABNORMAL
LYMPHOCYTES # BLD AUTO: 3.9 K/UL (ref 1–4.8)
LYMPHOCYTES NFR BLD: 32.3 % (ref 18–48)
MCH RBC QN AUTO: 24.4 PG (ref 27–31)
MCHC RBC AUTO-ENTMCNC: 30.7 G/DL (ref 32–36)
MCV RBC AUTO: 80 FL (ref 82–98)
MICROSCOPIC COMMENT: ABNORMAL
MONOCYTES # BLD AUTO: 0.6 K/UL (ref 0.3–1)
MONOCYTES NFR BLD: 5.1 % (ref 4–15)
NEUTROPHILS # BLD AUTO: 7 K/UL (ref 1.8–7.7)
NEUTROPHILS NFR BLD: 58.4 % (ref 38–73)
NITRITE UR QL STRIP: NEGATIVE
NRBC BLD-RTO: 0 /100 WBC
PH UR STRIP: 5 [PH] (ref 5–8)
PLATELET # BLD AUTO: 296 K/UL (ref 150–450)
PMV BLD AUTO: 9.9 FL (ref 9.2–12.9)
POTASSIUM SERPL-SCNC: 4 MMOL/L (ref 3.5–5.1)
PROT SERPL-MCNC: 7.8 G/DL (ref 6–8.4)
PROT UR QL STRIP: NEGATIVE
RBC # BLD AUTO: 5 M/UL (ref 4–5.4)
RBC #/AREA URNS HPF: 9 /HPF (ref 0–4)
SODIUM SERPL-SCNC: 141 MMOL/L (ref 136–145)
SP GR UR STRIP: 1.02 (ref 1–1.03)
SQUAMOUS #/AREA URNS HPF: 14 /HPF
UNIDENT CRYS URNS QL MICRO: ABNORMAL
URN SPEC COLLECT METH UR: ABNORMAL
UROBILINOGEN UR STRIP-ACNC: NEGATIVE EU/DL
WBC # BLD AUTO: 12.04 K/UL (ref 3.9–12.7)
WBC #/AREA URNS HPF: 14 /HPF (ref 0–5)
WBC CLUMPS URNS QL MICRO: ABNORMAL

## 2023-01-17 PROCEDURE — 81000 URINALYSIS NONAUTO W/SCOPE: CPT | Performed by: REGISTERED NURSE

## 2023-01-17 PROCEDURE — 85025 COMPLETE CBC W/AUTO DIFF WBC: CPT | Performed by: REGISTERED NURSE

## 2023-01-17 PROCEDURE — 96365 THER/PROPH/DIAG IV INF INIT: CPT

## 2023-01-17 PROCEDURE — 80053 COMPREHEN METABOLIC PANEL: CPT | Performed by: REGISTERED NURSE

## 2023-01-17 PROCEDURE — 63600175 PHARM REV CODE 636 W HCPCS: Performed by: REGISTERED NURSE

## 2023-01-17 PROCEDURE — 25000003 PHARM REV CODE 250: Performed by: REGISTERED NURSE

## 2023-01-17 PROCEDURE — 99285 EMERGENCY DEPT VISIT HI MDM: CPT | Mod: 25

## 2023-01-17 PROCEDURE — 87086 URINE CULTURE/COLONY COUNT: CPT | Performed by: REGISTERED NURSE

## 2023-01-17 RX ORDER — HYDROCODONE BITARTRATE AND ACETAMINOPHEN 5; 325 MG/1; MG/1
1 TABLET ORAL EVERY 6 HOURS PRN
Qty: 12 TABLET | Refills: 0 | Status: SHIPPED | OUTPATIENT
Start: 2023-01-17 | End: 2023-04-03

## 2023-01-17 RX ORDER — LEVOFLOXACIN 750 MG/1
750 TABLET ORAL DAILY
Qty: 5 TABLET | Refills: 0 | Status: SHIPPED | OUTPATIENT
Start: 2023-01-17 | End: 2023-04-03

## 2023-01-17 RX ORDER — ONDANSETRON 4 MG/1
4 TABLET, ORALLY DISINTEGRATING ORAL
Status: COMPLETED | OUTPATIENT
Start: 2023-01-17 | End: 2023-01-17

## 2023-01-17 RX ORDER — ONDANSETRON 4 MG/1
4 TABLET, ORALLY DISINTEGRATING ORAL EVERY 12 HOURS PRN
Qty: 12 TABLET | Refills: 0 | Status: SHIPPED | OUTPATIENT
Start: 2023-01-17 | End: 2023-08-08

## 2023-01-17 RX ORDER — HYDROCODONE BITARTRATE AND ACETAMINOPHEN 5; 325 MG/1; MG/1
1 TABLET ORAL
Status: COMPLETED | OUTPATIENT
Start: 2023-01-17 | End: 2023-01-17

## 2023-01-17 RX ADMIN — CEFTRIAXONE 1 G: 1 INJECTION, POWDER, FOR SOLUTION INTRAMUSCULAR; INTRAVENOUS at 01:01

## 2023-01-17 RX ADMIN — ONDANSETRON 4 MG: 4 TABLET, ORALLY DISINTEGRATING ORAL at 01:01

## 2023-01-17 RX ADMIN — HYDROCODONE BITARTRATE AND ACETAMINOPHEN 1 TABLET: 5; 325 TABLET ORAL at 01:01

## 2023-01-17 NOTE — ED PROVIDER NOTES
Encounter Date: 2023       History     Chief Complaint   Patient presents with    Flank Pain     Left flank pain wrapping around to LLQ abdomen, +n, -v/d, started last night     67-year-old female presents emergency department with complaints of left flank pain and nausea that began yesterday.  Patient describes the pain wraps around to her left lower abdomen.  Patient denies any fever chills, vomiting/diarrhea, chest pain, shortness of breath, dysuria or any other symptoms at this time.    The history is provided by the patient.   Review of patient's allergies indicates:  No Known Allergies  History reviewed. No pertinent past medical history.  Past Surgical History:   Procedure Laterality Date    CORONARY ANGIOGRAPHY N/A 3/12/2021    Procedure: ANGIOGRAM, CORONARY ARTERY;  Surgeon: Shruti Harrison MD;  Location: [facility] CATH LAB;  Service: Cardiology;  Laterality: N/A;    CORONARY ANGIOPLASTY WITH STENT PLACEMENT N/A 3/12/2021    Procedure: Angioplasty, Coronary Artery, With Stent Insertion;  Surgeon: Shruti Harrison MD;  Location: [facility] CATH LAB;  Service: Cardiology;  Laterality: N/A;    LEFT HEART CATHETERIZATION Left 3/12/2021    Procedure: CATHETERIZATION, HEART, LEFT;  Surgeon: Shruti Harrison MD;  Location: [facility] CATH LAB;  Service: Cardiology;  Laterality: Left;     History reviewed. No pertinent family history.  Social History     Tobacco Use    Smoking status: Former     Types: Cigarettes     Quit date: 3/5/2021     Years since quittin.8   Substance Use Topics    Alcohol use: Not Currently    Drug use: Never     Review of Systems   Constitutional:  Negative for fever.   HENT:  Negative for sore throat.    Respiratory:  Negative for shortness of breath.    Cardiovascular:  Negative for chest pain.   Gastrointestinal:  Positive for abdominal pain and nausea.   Genitourinary:  Positive for flank pain. Negative for dysuria.   Musculoskeletal:  Negative for back pain.   Skin:  Negative for  rash.   Neurological:  Negative for weakness.   Hematological:  Does not bruise/bleed easily.   All other systems reviewed and are negative.    Physical Exam     Initial Vitals [01/17/23 1052]   BP Pulse Resp Temp SpO2   (!) 157/66 64 20 98.3 °F (36.8 °C) 97 %      MAP       --         Physical Exam    Constitutional: She appears well-developed and well-nourished. She is not diaphoretic. No distress.   HENT:   Head: Normocephalic and atraumatic.   Eyes: Conjunctivae and EOM are normal. Pupils are equal, round, and reactive to light.   Neck: Neck supple.   Normal range of motion.  Cardiovascular:  Normal rate, regular rhythm and normal heart sounds.           No murmur heard.  Pulmonary/Chest: Breath sounds normal. No respiratory distress. She has no wheezes. She has no rales.   Abdominal: Abdomen is soft. Bowel sounds are normal. There is no abdominal tenderness.   There is left CVA tenderness. There is no rebound and no guarding.   Musculoskeletal:         General: No tenderness or edema. Normal range of motion.      Cervical back: Normal range of motion and neck supple.     Neurological: She is alert and oriented to person, place, and time. No cranial nerve deficit. GCS score is 15. GCS eye subscore is 4. GCS verbal subscore is 5. GCS motor subscore is 6.   Skin: Skin is warm and dry. Capillary refill takes less than 2 seconds.   Psychiatric: She has a normal mood and affect. Thought content normal.       ED Course   Procedures  Labs Reviewed   CBC W/ AUTO DIFFERENTIAL - Abnormal; Notable for the following components:       Result Value    MCV 80 (*)     MCH 24.4 (*)     MCHC 30.7 (*)     RDW 15.7 (*)     All other components within normal limits   COMPREHENSIVE METABOLIC PANEL - Abnormal; Notable for the following components:    CO2 22 (*)     Alkaline Phosphatase 145 (*)     All other components within normal limits   URINALYSIS, REFLEX TO URINE CULTURE - Abnormal; Notable for the following components:     Appearance, UA Hazy (*)     Leukocytes, UA 3+ (*)     All other components within normal limits    Narrative:     Specimen Source->Urine   URINALYSIS MICROSCOPIC - Abnormal; Notable for the following components:    RBC, UA 9 (*)     WBC, UA 14 (*)     WBC Clumps, UA Occasional (*)     All other components within normal limits    Narrative:     Specimen Source->Urine   CULTURE, URINE          Imaging Results              CT Renal Stone Study ABD Pelvis WO (Final result)  Result time 01/17/23 11:17:18      Final result by Jr Brumfield MD (01/17/23 11:17:18)                   Impression:      No acute findings.      Electronically signed by: Jr Brumfield  Date:    01/17/2023  Time:    11:17               Narrative:    EXAMINATION:  CT RENAL STONE STUDY ABD PELVIS WO    CLINICAL HISTORY:  Flank pain, kidney stone suspected;    FINDINGS:  Atelectatic change left lung base.    The liver, gallbladder and spleen appear normal.    No pancreatic abnormality.    The adrenal glands are normal.    No kidney stones or hydronephrosis.  No perinephric edema.    Aortic and branch structure atherosclerosis.    No bowel dilatation or acute bowel abnormality.  Left colonic diverticulosis.  No CT evidence of appendicitis.    The bladder appears normal.  Seen    No acute bony abnormality.                                       Medications   cefTRIAXone (ROCEPHIN) 1 g in dextrose 5 % in water (D5W) 5 % 50 mL IVPB (MB+) (1 g Intravenous New Bag 1/17/23 1320)   HYDROcodone-acetaminophen 5-325 mg per tablet 1 tablet (1 tablet Oral Given 1/17/23 1320)   ondansetron disintegrating tablet 4 mg (4 mg Oral Given 1/17/23 1320)     Medical Decision Making:   Initial Assessment:   Left flank pain that began yesterday  Differential Diagnosis:   Kidney stone  Pyelonephritis  Musculoskeletal back pain  Clinical Tests:   Lab Tests: Ordered and Reviewed  The following lab test(s) were unremarkable: CBC, CMP and Urinalysis       <> Summary of Lab: Urinary  tract infection  Radiological Study: Ordered and Reviewed  ED Management:  Patient has CVA tenderness to left thigh, CT was negative.  Will treat for pyelonephritis and have her follow-up with her primary care physician                        Clinical Impression:   Final diagnoses:  [R10.9] Left flank pain (Primary)  [N12] Pyelonephritis        ED Disposition Condition    Discharge Stable          ED Prescriptions       Medication Sig Dispense Start Date End Date Auth. Provider    HYDROcodone-acetaminophen (NORCO) 5-325 mg per tablet Take 1 tablet by mouth every 6 (six) hours as needed for Pain. 12 tablet 1/17/2023 -- MYRANDA Castillo Jr.    ondansetron (ZOFRAN-ODT) 4 MG TbDL Take 1 tablet (4 mg total) by mouth every 12 (twelve) hours as needed (vomiting). 12 tablet 1/17/2023 -- MYRANDA Castillo Jr.    levoFLOXacin (LEVAQUIN) 750 MG tablet Take 1 tablet (750 mg total) by mouth once daily. 5 tablet 1/17/2023 -- MYRANDA Castillo Jr.          Follow-up Information       Follow up With Specialties Details Why Contact Info    Jasiel Miller MD Family Medicine In 1 week  [address] [facility] [address]  [phone]               MYRANDA Castillo Jr.  01/17/23 4891

## 2023-01-17 NOTE — FIRST PROVIDER EVALUATION
Medical screening examination initiated.  I have conducted a focused provider triage encounter, findings are as follows:    Brief history of present illness:  Left flank pain that began yesterday with associated nausea    Vitals:    01/17/23 1052   Pulse: 64   Resp: 20   Temp: 98.3 °F (36.8 °C)   TempSrc: Oral   SpO2: 97%   Weight: 84.8 kg (187 lb)       Pertinent physical exam:  No acute distress, patient alert and orient    Brief workup plan:  Workup and further evaluation    Preliminary workup initiated; this workup will be continued and followed by the physician or advanced practice provider that is assigned to the patient when roomed.

## 2023-01-17 NOTE — Clinical Note
Candi Em accompanied their grandmother to the emergency department on 1/17/2023. They may return to work on 01/18/2023.      If you have any questions or concerns, please don't hesitate to call.      Nehemias ESPINOZA

## 2023-01-19 LAB
BACTERIA UR CULT: NORMAL
BACTERIA UR CULT: NORMAL

## 2023-02-01 ENCOUNTER — OFFICE VISIT (OUTPATIENT)
Dept: PULMONOLOGY | Facility: CLINIC | Age: 68
End: 2023-02-01
Payer: MEDICARE

## 2023-02-01 VITALS
BODY MASS INDEX: 34.5 KG/M2 | HEIGHT: 62 IN | RESPIRATION RATE: 18 BRPM | WEIGHT: 187.5 LBS | SYSTOLIC BLOOD PRESSURE: 150 MMHG | OXYGEN SATURATION: 96 % | DIASTOLIC BLOOD PRESSURE: 72 MMHG | HEART RATE: 55 BPM

## 2023-02-01 DIAGNOSIS — J43.9 PULMONARY EMPHYSEMA, UNSPECIFIED EMPHYSEMA TYPE: ICD-10-CM

## 2023-02-01 DIAGNOSIS — F17.211 NICOTINE DEPENDENCE, CIGARETTES, IN REMISSION: ICD-10-CM

## 2023-02-01 DIAGNOSIS — R09.02 EXERCISE HYPOXEMIA: ICD-10-CM

## 2023-02-01 DIAGNOSIS — J44.1 COPD EXACERBATION: Primary | ICD-10-CM

## 2023-02-01 PROCEDURE — 99999 PR PBB SHADOW E&M-EST. PATIENT-LVL V: CPT | Mod: PBBFAC,,, | Performed by: INTERNAL MEDICINE

## 2023-02-01 PROCEDURE — 99999 PR PBB SHADOW E&M-EST. PATIENT-LVL V: ICD-10-PCS | Mod: PBBFAC,,, | Performed by: INTERNAL MEDICINE

## 2023-02-01 PROCEDURE — 99205 PR OFFICE/OUTPT VISIT, NEW, LEVL V, 60-74 MIN: ICD-10-PCS | Mod: S$PBB,,, | Performed by: INTERNAL MEDICINE

## 2023-02-01 PROCEDURE — 99205 OFFICE O/P NEW HI 60 MIN: CPT | Mod: S$PBB,,, | Performed by: INTERNAL MEDICINE

## 2023-02-01 RX ORDER — AZITHROMYCIN 250 MG/1
TABLET, FILM COATED ORAL
Qty: 6 TABLET | Refills: 0 | Status: SHIPPED | OUTPATIENT
Start: 2023-02-01 | End: 2023-04-03

## 2023-02-01 RX ORDER — HYDROXYZINE PAMOATE 25 MG/1
CAPSULE ORAL
COMMUNITY
End: 2023-08-08

## 2023-02-01 RX ORDER — FLUTICASONE PROPIONATE AND SALMETEROL 250; 50 UG/1; UG/1
1 POWDER RESPIRATORY (INHALATION) 2 TIMES DAILY
Qty: 180 EACH | Refills: 3 | Status: SHIPPED | OUTPATIENT
Start: 2023-02-01 | End: 2024-03-19

## 2023-02-01 RX ORDER — ALBUTEROL SULFATE 90 UG/1
2 AEROSOL, METERED RESPIRATORY (INHALATION) EVERY 4 HOURS PRN
Qty: 54 G | Refills: 3 | Status: SHIPPED | OUTPATIENT
Start: 2023-02-01 | End: 2024-02-15

## 2023-02-01 RX ORDER — LORATADINE 10 MG/1
TABLET ORAL
COMMUNITY
End: 2023-08-08

## 2023-02-01 RX ORDER — PREDNISONE 20 MG/1
TABLET ORAL
Qty: 20 TABLET | Refills: 0 | Status: SHIPPED | OUTPATIENT
Start: 2023-02-01 | End: 2023-04-03

## 2023-02-01 RX ORDER — PROMETHAZINE HYDROCHLORIDE AND DEXTROMETHORPHAN HYDROBROMIDE 6.25; 15 MG/5ML; MG/5ML
5 SYRUP ORAL 4 TIMES DAILY PRN
Qty: 240 ML | Refills: 5 | Status: SHIPPED | OUTPATIENT
Start: 2023-02-01 | End: 2023-02-11

## 2023-02-01 RX ORDER — EZETIMIBE 10 MG/1
10 TABLET ORAL DAILY
COMMUNITY
Start: 2022-12-01

## 2023-02-01 RX ORDER — METFORMIN HYDROCHLORIDE 500 MG/1
TABLET, EXTENDED RELEASE ORAL
COMMUNITY
Start: 2022-12-01

## 2023-02-01 RX ORDER — ALBUTEROL SULFATE 0.83 MG/ML
2.5 SOLUTION RESPIRATORY (INHALATION)
Qty: 360 ML | Refills: 11 | Status: SHIPPED | OUTPATIENT
Start: 2023-02-01 | End: 2024-02-01

## 2023-02-01 NOTE — PROGRESS NOTES
Subjective:       Patient ID: Jacqueline Luna is a 67 y.o. female.    Chief Complaint:   HPI COPD  She presents for evaluation and treatment of COPD. The patient is currently having symptoms / an exacerbation. Current symptoms include chronic dyspnea, dyspnea after 1/4 blocks, non-productive cough, cough productive of white sputum in small amounts, and wheezing. Symptoms have been present since several years ago and have been gradually worsening. She denies chills and fever. Associated symptoms include poor exercise tolerance.  This episode appears to have been triggered by cold air and exercise. Treatments tried for the current exacerbation: albuterol inhaler. The patient has been having similar episodes for approximately 9 years. She uses 1 pillows at night. Patient currently is not on home oxygen therapy.. The patient is having no constitutional symptoms, denying fever, chills, anorexia, or weight loss. The patient has not been hospitalized for this condition before. She has a history of 17 pack years. The patient is experiencing exercise intolerance (difficulty walking 1/4 blocks on flat ground).  Hx of COVID in 2020  Oxygen levels are variable    History of RONALDO- unable to tolerate CPAP    Past Medical History:   Diagnosis Date    COPD (chronic obstructive pulmonary disease)      Past Surgical History:   Procedure Laterality Date    CORONARY ANGIOGRAPHY N/A 3/12/2021    Procedure: ANGIOGRAM, CORONARY ARTERY;  Surgeon: Shruti Harrison MD;  Location: [facility] CATH LAB;  Service: Cardiology;  Laterality: N/A;    CORONARY ANGIOPLASTY WITH STENT PLACEMENT N/A 3/12/2021    Procedure: Angioplasty, Coronary Artery, With Stent Insertion;  Surgeon: Shruti Harrison MD;  Location: [facility] CATH LAB;  Service: Cardiology;  Laterality: N/A;    LEFT HEART CATHETERIZATION Left 3/12/2021    Procedure: CATHETERIZATION, HEART, LEFT;  Surgeon: Shruti Harrison MD;  Location: [facility] CATH LAB;  Service: Cardiology;  Laterality: Left;  "    Social History     Socioeconomic History    Marital status: Single   Tobacco Use    Smoking status: Former     Packs/day: 0.50     Years: 35.00     Pack years: 17.50     Types: Cigarettes     Start date: 1986     Quit date: 3/5/2021     Years since quittin.9   Substance and Sexual Activity    Alcohol use: Not Currently    Drug use: Never    Sexual activity: Not Currently     Birth control/protection: Abstinence     @FAM@  Review of Systems   Constitutional:  Positive for fatigue. Negative for fever.   HENT:  Positive for postnasal drip, rhinorrhea and congestion.    Eyes:  Negative for redness and itching.   Respiratory:  Positive for cough, sputum production, shortness of breath, dyspnea on extertion, use of rescue inhaler and Paroxysmal Nocturnal Dyspnea.    Cardiovascular:  Negative for chest pain, palpitations and leg swelling.   Genitourinary:  Negative for difficulty urinating and hematuria.   Endocrine:  Negative for cold intolerance and heat intolerance.    Skin:  Negative for rash.   Gastrointestinal:  Negative for nausea and abdominal pain.   Neurological:  Negative for dizziness, syncope, weakness and light-headedness.   Hematological:  Negative for adenopathy. Does not bruise/bleed easily.   Psychiatric/Behavioral:  Negative for sleep disturbance. The patient is not nervous/anxious.      Objective:      BP (!) 150/72   Pulse (!) 55   Resp 18   Ht 5' 2" (1.575 m)   Wt 85.1 kg (187 lb 8 oz)   SpO2 96%   BMI 34.29 kg/m²   Physical Exam  Vitals and nursing note reviewed.   Constitutional:       Appearance: She is well-developed. She is obese.   HENT:      Head: Normocephalic and atraumatic.      Nose: Nose normal.      Mouth/Throat:      Pharynx: No oropharyngeal exudate.   Eyes:      Conjunctiva/sclera: Conjunctivae normal.      Pupils: Pupils are equal, round, and reactive to light.   Neck:      Thyroid: No thyromegaly.      Vascular: No JVD.      Trachea: No tracheal deviation. "   Cardiovascular:      Rate and Rhythm: Normal rate and regular rhythm.      Heart sounds: Normal heart sounds.   Pulmonary:      Effort: Pulmonary effort is normal. No respiratory distress.      Breath sounds: Examination of the right-lower field reveals wheezing. Examination of the left-lower field reveals wheezing. Decreased breath sounds and wheezing present. No rhonchi or rales.   Chest:      Chest wall: No tenderness.   Abdominal:      General: Bowel sounds are normal.      Palpations: Abdomen is soft.   Musculoskeletal:         General: Normal range of motion.      Cervical back: Neck supple.   Lymphadenopathy:      Cervical: No cervical adenopathy.   Skin:     General: Skin is warm and dry.   Neurological:      Mental Status: She is alert and oriented to person, place, and time.     Personal Diagnostic Review  Chest X-Ray: I personally reviewed the films and findings are:, air trapping/emphysema  CT Renal Stone Study ABD Pelvis WO  Narrative: EXAMINATION:  CT RENAL STONE STUDY ABD PELVIS WO    CLINICAL HISTORY:  Flank pain, kidney stone suspected;    FINDINGS:  Atelectatic change left lung base.    The liver, gallbladder and spleen appear normal.    No pancreatic abnormality.    The adrenal glands are normal.    No kidney stones or hydronephrosis.  No perinephric edema.    Aortic and branch structure atherosclerosis.    No bowel dilatation or acute bowel abnormality.  Left colonic diverticulosis.  No CT evidence of appendicitis.    The bladder appears normal.  Seen    No acute bony abnormality.  Impression: No acute findings.    Electronically signed by: Jr Brumfield  Date:    01/17/2023  Time:    11:17    Pulmonary function tests:  na  Pulmonary Studies Review 2/1/2023 1/17/2023 11/17/2022 11/17/2022 11/17/2022 2/17/2022 2/17/2022   SpO2 96 97 98 97 95 - -   Height 62 - - - 61 61 61   Weight 3000.02 2992 - - 2996.49 3056 3056   BMI (Calculated) 34.3 - - - 35.4 36.1 36.1   Predicted Distance 273.36 487.39 - -  266.49 267.96 267.96   Predicted Distance Meters (Calculated) 417.3 - - - 412.04 413.96 413.96     Pulmonary Function Tests 2/1/2023   SpO2 96   Height 62   Weight 3000.02   BMI (Calculated) 34.3   Some recent data might be hidden     CT Chest without Contrast    Anatomical Region Laterality Modality   Chest -- Computed Tomography     Impression    1. Increase in a small pericardial effusion.   2. Mild emphysematous change.   3. No suspicious nodule that would require follow-up. Stable tiny right upper lobe micronodule.  Narrative    CT CHEST WO CONTRAST     CLINICAL INDICATION: tob abuse emphysema     COMPARISON: 2/4/2019     TECHNIQUE: CT scan of the chest was performed without IV contrast. Automated exposure control was utilized for dose reduction.     FINDINGS: There is mild centrilobular and paraseptal emphysema in the upper lung zones. No focal airspace consolidation. There is minimal chronic scarring in the right middle lobe and lingula.     There is a stable 3 mm micronodule within the right upper lobe on image 26. No suspicious nodule.     No pleural effusion. There is a small pericardial effusion, increased since 2/4/2019. No evidence of mediastinal adenopathy. No adrenal mass.  Procedure Note    Mick Mott MD - 09/24/2019   Formatting of this note might be different from the original.   CT CHEST WO CONTRAST     CLINICAL INDICATION: tob abuse emphysema     COMPARISON: 2/4/2019     TECHNIQUE: CT scan of the chest was performed without IV contrast. Automated exposure control was utilized for dose reduction.     FINDINGS: There is mild centrilobular and paraseptal emphysema in the upper lung zones. No focal airspace consolidation. There is minimal chronic scarring in the right middle lobe and lingula.     There is a stable 3 mm micronodule within the right upper lobe on image 26. No suspicious nodule.     No pleural effusion. There is a small pericardial effusion, increased since 2/4/2019. No  evidence of mediastinal adenopathy. No adrenal mass.     IMPRESSION:   1. Increase in a small pericardial effusion.   2. Mild emphysematous change.   3. No suspicious nodule that would require follow-up. Stable tiny right upper lobe micronodule.  Exam End: 09/24/19 13:58 Last Resulted: 09/24/19 14:24   [facility] From[facility]  Result Received: 01/17/23 10:23    View Encounter             Assessment:       1. COPD exacerbation    2. Pulmonary emphysema, unspecified emphysema type    3. Exercise hypoxemia    4. Nicotine dependence, cigarettes, in remission             Outpatient Encounter Medications as of 2/1/2023   Medication Sig Dispense Refill    amLODIPine (NORVASC) 10 MG tablet Take 10 mg by mouth once daily.      atorvastatin (LIPITOR) 80 MG tablet Take 1 tablet (80 mg total) by mouth every evening. 30 tablet 11    clopidogreL (PLAVIX) 75 mg tablet TAKE 1 TABLET EVERY DAY 90 tablet 3    ezetimibe (ZETIA) 10 mg tablet ezetimibe 10 mg tablet      HYDROcodone-acetaminophen (NORCO) 5-325 mg per tablet Take 1 tablet by mouth every 6 (six) hours as needed for Pain. 12 tablet 0    hydrOXYzine pamoate (VISTARIL) 25 MG Cap hydroxyzine pamoate 25 mg capsule      ibuprofen (ADVIL,MOTRIN) 800 MG tablet ibuprofen 800 mg tablet      levoFLOXacin (LEVAQUIN) 750 MG tablet Take 1 tablet (750 mg total) by mouth once daily. 5 tablet 0    lisinopriL-hydrochlorothiazide (PRINZIDE,ZESTORETIC) 20-12.5 mg per tablet Take 1 tablet by mouth once daily.      loratadine (CLARITIN) 10 mg tablet loratadine 10 mg tablet   TAKE ONE Tablet BY MOUTH ONCE DAILY AS NEEDED FOR ITCHING AND congestion      LORazepam (ATIVAN) 2 MG Tab Take 2 mg by mouth 2 (two) times daily as needed.      metFORMIN (GLUCOPHAGE-XR) 500 MG ER 24hr tablet metformin  mg tablet,extended release 24 hr      metoprolol succinate (TOPROL-XL) 25 MG 24 hr tablet TAKE 1 TABLET EVERY DAY 90 tablet  1    ondansetron (ZOFRAN-ODT) 4 MG TbDL Take 1 tablet (4 mg total) by mouth every 12 (twelve) hours as needed (vomiting). 12 tablet 0    QUEtiapine (SEROQUEL) 100 MG Tab Take 100 mg by mouth daily as needed.      sertraline (ZOLOFT) 100 MG tablet Take 100 mg by mouth once daily.      [DISCONTINUED] albuterol (PROVENTIL/VENTOLIN HFA) 90 mcg/actuation inhaler Ventolin HFA 90 mcg/actuation aerosol inhaler      [DISCONTINUED] fluticasone-salmeterol diskus inhaler 100-50 mcg Inhale 1 puff into the lungs 2 (two) times daily. Controller 60 each 11    albuterol (PROVENTIL) 2.5 mg /3 mL (0.083 %) nebulizer solution Take 3 mLs (2.5 mg total) by nebulization every 4 to 6 hours as needed for Wheezing or Shortness of Breath. 360 mL 11    albuterol (PROVENTIL/VENTOLIN HFA) 90 mcg/actuation inhaler Inhale 2 puffs into the lungs every 4 (four) hours as needed for Wheezing or Shortness of Breath. Rescue 54 g 3    aspirin (ECOTRIN) 81 MG EC tablet Take 1 tablet (81 mg total) by mouth once daily. 90 tablet 3    azithromycin (ZITHROMAX Z-MONTSE) 250 MG tablet 500 mg on day 1 (two tablets) followed by 250 mg once daily on days 2-5 6 tablet 0    fluticasone-salmeterol diskus inhaler 250-50 mcg Inhale 1 puff into the lungs 2 (two) times daily. Wash out mouth after use. 180 each 3    furosemide (LASIX) 20 MG tablet Take 1 tablet (20 mg total) by mouth daily as needed (swelling). 30 tablet 11    gabapentin (NEURONTIN) 300 MG capsule Take 1 capsule (300 mg total) by mouth 2 (two) times daily. 60 capsule 11    predniSONE (DELTASONE) 20 MG tablet Prednisone 60 mg/ day for 3 days, 40 mg/day for 3 days,20 mg/ day for 3 days, (1/2 tablet )10 mg a day for 3 days. 20 tablet 0    promethazine-dextromethorphan (PROMETHAZINE-DM) 6.25-15 mg/5 mL Syrp Take 5 mLs by mouth 4 (four) times daily as needed (cough). 240 mL 5     No facility-administered encounter medications on file as of 2/1/2023.     Orders Placed This Encounter   Procedures    NEBULIZER KIT  "(SUPPLIES) FOR HOME USE     Order Specific Question:   Height:     Answer:   5' 2" (1.575 m)     Order Specific Question:   Weight:     Answer:   85.1 kg (187 lb 8 oz)     Order Specific Question:   Length of need (1-99 months):     Answer:   99     Order Specific Question:   Mask or Mouthpiece?     Answer:   Mouthpiece    CT Chest Lung Screening Low Dose     Schedule follow up office visit after test.     Standing Status:   Future     Standing Expiration Date:   8/1/2024     Order Specific Question:   Does the patient show any signs or symptoms of lung cancer?     Answer:   No     Order Specific Question:   Is this the first (baseline) CT or an annual exam?     Answer:   Baseline [1]     Order Specific Question:   Is there documentation of shared decision making for this lung screening exam?     Answer:   Yes     Order Specific Question:   Access port per protocol?     Answer:   No     Order Specific Question:   Is this a low dose screening chest CT?     Answer:   Yes    Ambulatory referral/consult to Pulmonary Disease Management w/ Respiratory Therapist     Standing Status:   Future     Standing Expiration Date:   3/1/2024     Referral Priority:   Routine     Referral Type:   Consultation     Referral Reason:   Specialty Services Required     Requested Specialty:   Pulmonary Disease     Number of Visits Requested:   1    Complete PFT with bronchodilator     Standing Status:   Future     Standing Expiration Date:   8/1/2024     Order Specific Question:   Release to patient     Answer:   Immediate    Six Minute Walk Test to qualify for Home Oxygen     Standing Status:   Future     Standing Expiration Date:   2/1/2024     Order Specific Question:   Release to patient     Answer:   Immediate     Plan:       Requested Prescriptions     Signed Prescriptions Disp Refills    albuterol (PROVENTIL) 2.5 mg /3 mL (0.083 %) nebulizer solution 360 mL 11     Sig: Take 3 mLs (2.5 mg total) by nebulization every 4 to 6 hours as " needed for Wheezing or Shortness of Breath.    fluticasone-salmeterol diskus inhaler 250-50 mcg 180 each 3     Sig: Inhale 1 puff into the lungs 2 (two) times daily. Wash out mouth after use.    albuterol (PROVENTIL/VENTOLIN HFA) 90 mcg/actuation inhaler 54 g 3     Sig: Inhale 2 puffs into the lungs every 4 (four) hours as needed for Wheezing or Shortness of Breath. Rescue    promethazine-dextromethorphan (PROMETHAZINE-DM) 6.25-15 mg/5 mL Syrp 240 mL 5     Sig: Take 5 mLs by mouth 4 (four) times daily as needed (cough).    predniSONE (DELTASONE) 20 MG tablet 20 tablet 0     Sig: Prednisone 60 mg/ day for 3 days, 40 mg/day for 3 days,20 mg/ day for 3 days, (1/2 tablet )10 mg a day for 3 days.    azithromycin (ZITHROMAX Z-MONTSE) 250 MG tablet 6 tablet 0     Si mg on day 1 (two tablets) followed by 250 mg once daily on days 2-5     COPD exacerbation  -     predniSONE (DELTASONE) 20 MG tablet; Prednisone 60 mg/ day for 3 days, 40 mg/day for 3 days,20 mg/ day for 3 days, (1/2 tablet )10 mg a day for 3 days.  Dispense: 20 tablet; Refill: 0  -     azithromycin (ZITHROMAX Z-MONTSE) 250 MG tablet; 500 mg on day 1 (two tablets) followed by 250 mg once daily on days 2-5  Dispense: 6 tablet; Refill: 0    Pulmonary emphysema, unspecified emphysema type  -     Complete PFT with bronchodilator; Future; Expected date: 2023  -     Six Minute Walk Test to qualify for Home Oxygen; Future  -     albuterol (PROVENTIL) 2.5 mg /3 mL (0.083 %) nebulizer solution; Take 3 mLs (2.5 mg total) by nebulization every 4 to 6 hours as needed for Wheezing or Shortness of Breath.  Dispense: 360 mL; Refill: 11  -     NEBULIZER KIT (SUPPLIES) FOR HOME USE  -     fluticasone-salmeterol diskus inhaler 250-50 mcg; Inhale 1 puff into the lungs 2 (two) times daily. Wash out mouth after use.  Dispense: 180 each; Refill: 3  -     Ambulatory referral/consult to Pulmonary Disease Management w/ Respiratory Therapist; Future; Expected date: 2023  -      albuterol (PROVENTIL/VENTOLIN HFA) 90 mcg/actuation inhaler; Inhale 2 puffs into the lungs every 4 (four) hours as needed for Wheezing or Shortness of Breath. Rescue  Dispense: 54 g; Refill: 3  -     promethazine-dextromethorphan (PROMETHAZINE-DM) 6.25-15 mg/5 mL Syrp; Take 5 mLs by mouth 4 (four) times daily as needed (cough).  Dispense: 240 mL; Refill: 5  -     predniSONE (DELTASONE) 20 MG tablet; Prednisone 60 mg/ day for 3 days, 40 mg/day for 3 days,20 mg/ day for 3 days, (1/2 tablet )10 mg a day for 3 days.  Dispense: 20 tablet; Refill: 0  -     azithromycin (ZITHROMAX Z-MONTSE) 250 MG tablet; 500 mg on day 1 (two tablets) followed by 250 mg once daily on days 2-5  Dispense: 6 tablet; Refill: 0    Exercise hypoxemia  -     Six Minute Walk Test to qualify for Home Oxygen; Future    Nicotine dependence, cigarettes, in remission  -     CT Chest Lung Screening Low Dose; Future; Expected date: 02/01/2023      Follow up in about 6 weeks (around 3/15/2023) for Review CT/PET - on return visit, 6 min walk - on return, PFT on return.    MEDICAL DECISION MAKING: Moderate to high complexity.  Overall, the multiple problems listed are of moderate to high severity that may impact quality of life and activities of daily living. Side effects of medications, treatment plan as well as options and alternatives reviewed and discussed with patient. There was counseling of patient concerning these issues.    Total time spent in counseling and coordination of care - 60  minutes of total time spent on the encounter, which includes face to face time and non-face to face time preparing to see the patient (eg, review of tests), Obtaining and/or reviewing separately obtained history, Documenting clinical information in the electronic or other health record, Independently interpreting results (not separately reported) and communicating results to the patient/family/caregiver, or Care coordination (not separately reported).    Time was used  in discussion of prognosis, risks, benefits of treatment, instructions and compliance with regimen . Discussion with other physicians and/or health care providers - home health or for use of durable medical equipment (oxygen, nebulizers, CPAP, BiPAP) occurred.

## 2023-03-10 ENCOUNTER — TELEPHONE (OUTPATIENT)
Dept: PULMONOLOGY | Facility: CLINIC | Age: 68
End: 2023-03-10
Payer: MEDICARE

## 2023-03-13 ENCOUNTER — TELEPHONE (OUTPATIENT)
Dept: PULMONOLOGY | Facility: CLINIC | Age: 68
End: 2023-03-13
Payer: MEDICARE

## 2023-03-13 NOTE — TELEPHONE ENCOUNTER
----- Message from Jose G Rodriguez sent at 3/13/2023 12:44 PM CDT -----  Contact: [phone]  Patient requesting a call back in regards to rescheduling her test. Please call back at [phone]. Thanks KD

## 2023-04-03 ENCOUNTER — CLINICAL SUPPORT (OUTPATIENT)
Dept: PULMONOLOGY | Facility: CLINIC | Age: 68
End: 2023-04-03
Payer: MEDICARE

## 2023-04-03 ENCOUNTER — OFFICE VISIT (OUTPATIENT)
Dept: PULMONOLOGY | Facility: CLINIC | Age: 68
End: 2023-04-03
Payer: MEDICARE

## 2023-04-03 ENCOUNTER — HOSPITAL ENCOUNTER (OUTPATIENT)
Dept: RADIOLOGY | Facility: HOSPITAL | Age: 68
Discharge: HOME | End: 2023-04-03
Attending: INTERNAL MEDICINE
Payer: MEDICARE

## 2023-04-03 VITALS
HEART RATE: 79 BPM | HEIGHT: 62 IN | SYSTOLIC BLOOD PRESSURE: 136 MMHG | WEIGHT: 186.06 LBS | OXYGEN SATURATION: 98 % | DIASTOLIC BLOOD PRESSURE: 65 MMHG | BODY MASS INDEX: 34.24 KG/M2 | RESPIRATION RATE: 18 BRPM

## 2023-04-03 VITALS — BODY MASS INDEX: 34.24 KG/M2 | HEIGHT: 62 IN | WEIGHT: 186.06 LBS

## 2023-04-03 DIAGNOSIS — J43.9 PULMONARY EMPHYSEMA, UNSPECIFIED EMPHYSEMA TYPE: ICD-10-CM

## 2023-04-03 DIAGNOSIS — R09.02 EXERCISE HYPOXEMIA: ICD-10-CM

## 2023-04-03 DIAGNOSIS — F17.211 NICOTINE DEPENDENCE, CIGARETTES, IN REMISSION: ICD-10-CM

## 2023-04-03 DIAGNOSIS — F17.211 NICOTINE DEPENDENCE, CIGARETTES, IN REMISSION: Primary | ICD-10-CM

## 2023-04-03 LAB
BRPFT: ABNORMAL
DLCO ADJ PRE: 15.34 ML/(MIN*MMHG) (ref 14.63–26.1)
DLCO SINGLE BREATH LLN: 14.63
DLCO SINGLE BREATH PRE REF: 75.3 %
DLCO SINGLE BREATH REF: 20.36
DLCOC SBVA LLN: 2.87
DLCOC SBVA PRE REF: 93 %
DLCOC SBVA REF: 4.45
DLCOC SINGLE BREATH LLN: 14.63
DLCOC SINGLE BREATH PRE REF: 75.3 %
DLCOC SINGLE BREATH REF: 20.36
DLCOVA LLN: 2.87
DLCOVA PRE REF: 93 %
DLCOVA PRE: 4.14 ML/(MIN*MMHG*L) (ref 2.87–6.04)
DLCOVA REF: 4.45
DLVAADJ PRE: 4.14 ML/(MIN*MMHG*L) (ref 2.87–6.04)
ERV LLN: -16449.33
ERV PRE REF: 22.4 %
ERV REF: 0.67
FEF 25 75 CHG: 62.1 %
FEF 25 75 LLN: 0.65
FEF 25 75 POST REF: 71.1 %
FEF 25 75 PRE REF: 43.9 %
FEF 25 75 REF: 1.65
FET100 CHG: 9.9 %
FEV1 CHG: 19.1 %
FEV1 FVC CHG: 9.3 %
FEV1 FVC LLN: 67
FEV1 FVC POST REF: 93.9 %
FEV1 FVC PRE REF: 85.9 %
FEV1 FVC REF: 79
FEV1 LLN: 1.29
FEV1 POST REF: 85.6 %
FEV1 PRE REF: 71.9 %
FEV1 REF: 1.83
FRCPLETH LLN: 1.76
FRCPLETH PREREF: 98.5 %
FRCPLETH REF: 2.58
FVC CHG: 9 %
FVC LLN: 1.66
FVC POST REF: 90.8 %
FVC PRE REF: 83.3 %
FVC REF: 2.32
IVC PRE: 2.11 L (ref 1.66–2.98)
IVC SINGLE BREATH LLN: 1.66
IVC SINGLE BREATH PRE REF: 91 %
IVC SINGLE BREATH REF: 2.32
MVV LLN: 66
MVV PRE REF: 53.2 %
MVV REF: 77
PEF CHG: -21 %
PEF LLN: 2.87
PEF POST REF: 84.1 %
PEF PRE REF: 106.5 %
PEF REF: 4.74
POST FEF 25 75: 1.18 L/S (ref 0.65–2.66)
POST FET 100: 9.51 SEC
POST FEV1 FVC: 74.32 % (ref 66.56–91.79)
POST FEV1: 1.57 L (ref 1.29–2.37)
POST FVC: 2.11 L (ref 1.66–2.98)
POST PEF: 3.99 L/S (ref 2.87–6.61)
PRE DLCO: 15.34 ML/(MIN*MMHG) (ref 14.63–26.1)
PRE ERV: 0.15 L (ref -16449.33–16450.67)
PRE FEF 25 75: 0.73 L/S (ref 0.65–2.66)
PRE FET 100: 8.66 SEC
PRE FEV1 FVC: 68.03 % (ref 66.56–91.79)
PRE FEV1: 1.31 L (ref 1.29–2.37)
PRE FRC PL: 2.54 L
PRE FVC: 1.93 L (ref 1.66–2.98)
PRE MVV: 41 L/MIN (ref 65.5–88.62)
PRE PEF: 5.05 L/S (ref 2.87–6.61)
PRE RV: 2.33 L (ref 1.34–2.49)
PRE TLC: 4.54 L (ref 3.58–5.56)
RAW LLN: 3.06
RAW PRE REF: 254.5 %
RAW PRE: 7.79 CMH2O*S/L (ref 3.06–3.06)
RAW REF: 3.06
RV LLN: 1.34
RV PRE REF: 121.9 %
RV REF: 1.91
RVTLC LLN: 32
RVTLC PRE REF: 123.2 %
RVTLC PRE: 51.44 % (ref 32.15–51.33)
RVTLC REF: 42
TLC LLN: 3.58
TLC PRE REF: 99.2 %
TLC REF: 4.57
VA PRE: 3.71 L (ref 4.42–4.42)
VA SINGLE BREATH LLN: 4.42
VA SINGLE BREATH PRE REF: 83.8 %
VA SINGLE BREATH REF: 4.42
VC LLN: 1.66
VC PRE REF: 94.9 %
VC PRE: 2.2 L (ref 1.66–2.98)
VC REF: 2.32
VTGRAWPRE: 2.69 L

## 2023-04-03 PROCEDURE — 3075F SYST BP GE 130 - 139MM HG: CPT | Mod: CPTII,S$GLB,, | Performed by: INTERNAL MEDICINE

## 2023-04-03 PROCEDURE — 71271 CT THORAX LUNG CANCER SCR C-: CPT | Mod: TC

## 2023-04-03 PROCEDURE — 99214 OFFICE O/P EST MOD 30 MIN: CPT | Mod: 25,S$GLB,, | Performed by: INTERNAL MEDICINE

## 2023-04-03 PROCEDURE — 3288F FALL RISK ASSESSMENT DOCD: CPT | Mod: CPTII,S$GLB,, | Performed by: INTERNAL MEDICINE

## 2023-04-03 PROCEDURE — 1160F RVW MEDS BY RX/DR IN RCRD: CPT | Mod: CPTII,S$GLB,, | Performed by: INTERNAL MEDICINE

## 2023-04-03 PROCEDURE — 4010F ACE/ARB THERAPY RXD/TAKEN: CPT | Mod: CPTII,S$GLB,, | Performed by: INTERNAL MEDICINE

## 2023-04-03 PROCEDURE — 3008F PR BODY MASS INDEX (BMI) DOCUMENTED: ICD-10-PCS | Mod: CPTII,S$GLB,, | Performed by: INTERNAL MEDICINE

## 2023-04-03 PROCEDURE — 1126F AMNT PAIN NOTED NONE PRSNT: CPT | Mod: CPTII,S$GLB,, | Performed by: INTERNAL MEDICINE

## 2023-04-03 PROCEDURE — 99999 PR PBB SHADOW E&M-EST. PATIENT-LVL IV: ICD-10-PCS | Mod: PBBFAC,,, | Performed by: INTERNAL MEDICINE

## 2023-04-03 PROCEDURE — 3008F BODY MASS INDEX DOCD: CPT | Mod: CPTII,S$GLB,, | Performed by: INTERNAL MEDICINE

## 2023-04-03 PROCEDURE — 3078F PR MOST RECENT DIASTOLIC BLOOD PRESSURE < 80 MM HG: ICD-10-PCS | Mod: CPTII,S$GLB,, | Performed by: INTERNAL MEDICINE

## 2023-04-03 PROCEDURE — 94726 PULM FUNCT TST PLETHYSMOGRAP: ICD-10-PCS | Mod: S$GLB,,, | Performed by: INTERNAL MEDICINE

## 2023-04-03 PROCEDURE — 1101F PR PT FALLS ASSESS DOC 0-1 FALLS W/OUT INJ PAST YR: ICD-10-PCS | Mod: CPTII,S$GLB,, | Performed by: INTERNAL MEDICINE

## 2023-04-03 PROCEDURE — 94726 PLETHYSMOGRAPHY LUNG VOLUMES: CPT | Mod: S$GLB,,, | Performed by: INTERNAL MEDICINE

## 2023-04-03 PROCEDURE — 71271 CT CHEST LUNG SCREENING LOW DOSE: ICD-10-PCS | Mod: 26,,, | Performed by: RADIOLOGY

## 2023-04-03 PROCEDURE — 94618 PULMONARY STRESS TESTING: ICD-10-PCS | Mod: S$GLB,,, | Performed by: INTERNAL MEDICINE

## 2023-04-03 PROCEDURE — 71271 CT THORAX LUNG CANCER SCR C-: CPT | Mod: 26,,, | Performed by: RADIOLOGY

## 2023-04-03 PROCEDURE — 94060 PR EVAL OF BRONCHOSPASM: ICD-10-PCS | Mod: S$GLB,,, | Performed by: INTERNAL MEDICINE

## 2023-04-03 PROCEDURE — 4010F PR ACE/ARB THEARPY RXD/TAKEN: ICD-10-PCS | Mod: CPTII,S$GLB,, | Performed by: INTERNAL MEDICINE

## 2023-04-03 PROCEDURE — 99214 PR OFFICE/OUTPT VISIT, EST, LEVL IV, 30-39 MIN: ICD-10-PCS | Mod: 25,S$GLB,, | Performed by: INTERNAL MEDICINE

## 2023-04-03 PROCEDURE — 3078F DIAST BP <80 MM HG: CPT | Mod: CPTII,S$GLB,, | Performed by: INTERNAL MEDICINE

## 2023-04-03 PROCEDURE — 1101F PT FALLS ASSESS-DOCD LE1/YR: CPT | Mod: CPTII,S$GLB,, | Performed by: INTERNAL MEDICINE

## 2023-04-03 PROCEDURE — 94618 PULMONARY STRESS TESTING: CPT | Mod: S$GLB,,, | Performed by: INTERNAL MEDICINE

## 2023-04-03 PROCEDURE — 1159F PR MEDICATION LIST DOCUMENTED IN MEDICAL RECORD: ICD-10-PCS | Mod: CPTII,S$GLB,, | Performed by: INTERNAL MEDICINE

## 2023-04-03 PROCEDURE — 1160F PR REVIEW ALL MEDS BY PRESCRIBER/CLIN PHARMACIST DOCUMENTED: ICD-10-PCS | Mod: CPTII,S$GLB,, | Performed by: INTERNAL MEDICINE

## 2023-04-03 PROCEDURE — 3288F PR FALLS RISK ASSESSMENT DOCUMENTED: ICD-10-PCS | Mod: CPTII,S$GLB,, | Performed by: INTERNAL MEDICINE

## 2023-04-03 PROCEDURE — 3075F PR MOST RECENT SYSTOLIC BLOOD PRESS GE 130-139MM HG: ICD-10-PCS | Mod: CPTII,S$GLB,, | Performed by: INTERNAL MEDICINE

## 2023-04-03 PROCEDURE — 99999 PR PBB SHADOW E&M-EST. PATIENT-LVL IV: CPT | Mod: PBBFAC,,, | Performed by: INTERNAL MEDICINE

## 2023-04-03 PROCEDURE — 94729 DIFFUSING CAPACITY: CPT | Mod: S$GLB,,, | Performed by: INTERNAL MEDICINE

## 2023-04-03 PROCEDURE — 94060 EVALUATION OF WHEEZING: CPT | Mod: S$GLB,,, | Performed by: INTERNAL MEDICINE

## 2023-04-03 PROCEDURE — 94729 PR C02/MEMBANE DIFFUSE CAPACITY: ICD-10-PCS | Mod: S$GLB,,, | Performed by: INTERNAL MEDICINE

## 2023-04-03 PROCEDURE — 1159F MED LIST DOCD IN RCRD: CPT | Mod: CPTII,S$GLB,, | Performed by: INTERNAL MEDICINE

## 2023-04-03 PROCEDURE — 1126F PR PAIN SEVERITY QUANTIFIED, NO PAIN PRESENT: ICD-10-PCS | Mod: CPTII,S$GLB,, | Performed by: INTERNAL MEDICINE

## 2023-04-03 NOTE — PROGRESS NOTES
Subjective:       Patient ID: Jacqueline Luna is a 67 y.o. female.    Chief Complaint:   HPI COPD  She presents for evaluation and treatment of COPD. The patient is not currently having symptoms / an exacerbation. Current symptoms include chronic dyspnea, dyspnea after 1/4 blocks, non-productive cough, cough productive of white sputum in small amounts, and wheezing. Symptoms have been present since several years ago and have been gradually worsening. She denies chills and fever. Associated symptoms include poor exercise tolerance.  This episode appears to have been triggered by cold air and exercise. Treatments tried for the current exacerbation: albuterol inhaler. The patient has been having similar episodes for approximately 9 years. She uses 1 pillows at night. Patient currently is not on home oxygen therapy.. The patient is having no constitutional symptoms, denying fever, chills, anorexia, or weight loss. The patient has not been hospitalized for this condition before. She has a history of 17 pack years. The patient is experiencing exercise intolerance (difficulty walking 1/4 blocks on flat ground).  Hx of COVID in 2020  Oxygen levels are stable    History of RONALDO- unable to tolerate CPAP    Past Medical History:   Diagnosis Date    COPD (chronic obstructive pulmonary disease)      Past Surgical History:   Procedure Laterality Date    CORONARY ANGIOGRAPHY N/A 3/12/2021    Procedure: ANGIOGRAM, CORONARY ARTERY;  Surgeon: Shruti Harrison MD;  Location: [facility] CATH LAB;  Service: Cardiology;  Laterality: N/A;    CORONARY ANGIOPLASTY WITH STENT PLACEMENT N/A 3/12/2021    Procedure: Angioplasty, Coronary Artery, With Stent Insertion;  Surgeon: Shruti Harrison MD;  Location: [facility] CATH LAB;  Service: Cardiology;  Laterality: N/A;    LEFT HEART CATHETERIZATION Left 3/12/2021    Procedure: CATHETERIZATION, HEART, LEFT;  Surgeon: Shruti Harrison MD;  Location: [facility] CATH LAB;  Service: Cardiology;  Laterality: Left;  "    Social History     Socioeconomic History    Marital status: Single   Tobacco Use    Smoking status: Former     Packs/day: 0.50     Years: 35.00     Pack years: 17.50     Types: Cigarettes     Start date: 1986     Quit date: 3/5/2021     Years since quittin.0   Substance and Sexual Activity    Alcohol use: Not Currently    Drug use: Never    Sexual activity: Not Currently     Birth control/protection: Abstinence     @FAM@  Review of Systems   Constitutional:  Positive for fatigue. Negative for fever.   HENT:  Positive for postnasal drip, rhinorrhea and congestion.    Eyes:  Negative for redness and itching.   Respiratory:  Positive for cough, sputum production, shortness of breath, dyspnea on extertion, use of rescue inhaler and Paroxysmal Nocturnal Dyspnea.    Cardiovascular:  Negative for chest pain, palpitations and leg swelling.   Genitourinary:  Negative for difficulty urinating and hematuria.   Endocrine:  Negative for cold intolerance and heat intolerance.    Skin:  Negative for rash.   Gastrointestinal:  Negative for nausea and abdominal pain.   Neurological:  Negative for dizziness, syncope, weakness and light-headedness.   Hematological:  Negative for adenopathy. Does not bruise/bleed easily.   Psychiatric/Behavioral:  Negative for sleep disturbance. The patient is not nervous/anxious.      Objective:      /65   Pulse 79   Resp 18   Ht 5' 2" (1.575 m)   Wt 84.4 kg (186 lb 1.1 oz)   SpO2 98%   BMI 34.03 kg/m²   Physical Exam  Vitals and nursing note reviewed.   Constitutional:       Appearance: She is well-developed. She is obese.   HENT:      Head: Normocephalic and atraumatic.      Nose: Nose normal.      Mouth/Throat:      Pharynx: No oropharyngeal exudate.   Eyes:      Conjunctiva/sclera: Conjunctivae normal.      Pupils: Pupils are equal, round, and reactive to light.   Neck:      Thyroid: No thyromegaly.      Vascular: No JVD.      Trachea: No tracheal deviation. "   Cardiovascular:      Rate and Rhythm: Normal rate and regular rhythm.      Heart sounds: Normal heart sounds.   Pulmonary:      Effort: Pulmonary effort is normal. No respiratory distress.      Breath sounds: Examination of the right-lower field reveals wheezing. Examination of the left-lower field reveals wheezing. Decreased breath sounds and wheezing present. No rhonchi or rales.   Chest:      Chest wall: No tenderness.   Abdominal:      General: Bowel sounds are normal.      Palpations: Abdomen is soft.   Musculoskeletal:         General: Normal range of motion.      Cervical back: Neck supple.   Lymphadenopathy:      Cervical: No cervical adenopathy.   Skin:     General: Skin is warm and dry.   Neurological:      Mental Status: She is alert and oriented to person, place, and time.     Personal Diagnostic Review    Spirometry is normal. Spirometry is improved following bronchodilator administration. Lung volume determination is normal. Airway mechanics are abnormal showing increased airway resistance and decreased conductance. DLCO is normal. MVV is moderately   decreased.       Chest X-Ray: I personally reviewed the films and findings are:, air trapping/emphysema  CT Chest Lung Screening Low Dose  Narrative: EXAM:  LOW-DOSE LUNG CANCER SCREENING CT    DATE: 4/3/2023 9:50 AM    CLINICAL HISTORY: Risk factors for lung cancer.  Tobacco use.    COMPARISON: None.    TECHNIQUE: Axial CT imaging was performed of the chest utilizing a low-dose protocol.    Computed tomography dose index (CTDI):  6.44 mGy.    Dose-length product (DLP):  225.84 mGy-cm.    FINDINGS:    4 mm right upper lobe pulmonary nodule (series 4 image 217).    Mild scarring left lung apex.  Atelectasis or scarring within the lingula.  No pulmonary nodules within the left lung.    Heart normal in size.  Mild coronary artery calcification.  Aorta normal in caliber with atherosclerotic calcification.  Negative for adenopathy throughout the  chest.    Visualized upper abdomen normal.  Chest wall soft tissues are normal.  No acute bony abnormality.  Impression: 1.    4 mm right upper lobe pulmonary nodule.    CATEGORY (ACR Lung-RADS Version 1.0):    LungRADS 2.  Benign appearance and behavior. Follow-up screening low-dose chest CT recommended in 12 months.    MODIFIER:    None    Finalized on: 4/3/2023 11:59 AM By:  Jasiel Paredes MD  [facility]# [id number]      2023-04-03 12:01:21.675    [facility]      CT Chest without Contrast    Anatomical Region Laterality Modality   Chest -- Computed Tomography     Impression    1. Increase in a small pericardial effusion.   2. Mild emphysematous change.   3. No suspicious nodule that would require follow-up. Stable tiny right upper lobe micronodule.  Narrative    CT CHEST WO CONTRAST     CLINICAL INDICATION: tob abuse emphysema     COMPARISON: 2/4/2019     TECHNIQUE: CT scan of the chest was performed without IV contrast. Automated exposure control was utilized for dose reduction.     FINDINGS: There is mild centrilobular and paraseptal emphysema in the upper lung zones. No focal airspace consolidation. There is minimal chronic scarring in the right middle lobe and lingula.     There is a stable 3 mm micronodule within the right upper lobe on image 26. No suspicious nodule.     No pleural effusion. There is a small pericardial effusion, increased since 2/4/2019. No evidence of mediastinal adenopathy. No adrenal mass.  Procedure Note    Mick Mott MD - 09/24/2019   Formatting of this note might be different from the original.   CT CHEST WO CONTRAST     CLINICAL INDICATION: tob abuse emphysema     COMPARISON: 2/4/2019     TECHNIQUE: CT scan of the chest was performed without IV contrast. Automated exposure control was utilized for dose reduction.     FINDINGS: There is mild centrilobular and paraseptal emphysema in the upper lung zones. No focal airspace consolidation. There is minimal chronic scarring in the  right middle lobe and lingula.     There is a stable 3 mm micronodule within the right upper lobe on image 26. No suspicious nodule.     No pleural effusion. There is a small pericardial effusion, increased since 2/4/2019. No evidence of mediastinal adenopathy. No adrenal mass.     IMPRESSION:   1. Increase in a small pericardial effusion.   2. Mild emphysematous change.   3. No suspicious nodule that would require follow-up. Stable tiny right upper lobe micronodule.  Exam End: 09/24/19 13:58 Last Resulted: 09/24/19 14:24   [facility]idi[facility] and [facility]ili[facility]  Result Received: 01/17/23 10:23          Pulmonary function tests:  na  Pulmonary Studies Review 4/3/2023 4/3/2023 2/1/2023 1/17/2023 11/17/2022 11/17/2022 11/17/2022   SpO2 98 - 96 97 98 97 95   Ordering Provider - Dr Larios - - - - -   Interpreting Provider - Dr Larios - - - - -   Performing nurse/tech/RT Josey Forrester RRT - - - - -   Diagnosis - Emphysema - - - - -   Height 62 62 62 - - - 61   Weight 2977.09 2977.09 3000.02 2992 - - 2996.49   BMI (Calculated) 34 34 34.3 - - - 35.4   Predicted Distance 275.23 275.23 273.36 487.39 - - 266.49   Patient Race -  - - - - -   6MWT Status - completed without stopping - - - - -   Patient Reported - No complaints - - - - -   Was O2 used? - No - - - - -   6MW Distance walked (feet) - 900 - - - - -   Distance walked (meters) - 274.32 - - - - -   Did patient stop? - No - - - - -   Type of assistive device(s) used? - no assistive devices - - - - -   Is extra documentation required for this patient? - Yes - - - - -   Oxygen Saturation - 96 - - - - -   Supplemental Oxygen - Room Air - - - - -   Heart Rate - 72 - - - - -   Blood Pressure - 138/65 - - - - -   Wade Dyspnea Rating  - light - - - - -   Oxygen Saturation - 100 - - - - -   Supplemental Oxygen - Room Air - - - - -   Heart Rate - 100 - - - - -   Blood Pressure - 144/70 - - - - -   Wade  Dyspnea Rating  - somewhat heavy - - - - -   Recovery Time (seconds) - 240 - - - - -   Oxygen Saturation - 98 - - - - -   Supplemental Oxygen - Room Air - - - - -   Heart Rate - 79 - - - - -   Blood Pressure - 136/65 - - - - -   Wade Dyspnea Rating  - light - - - - -   Is procedure ready for interpretation? - Yes - - - - -   Did the patient stop or pause? - No - - - - -   Total Time Walked (Calculated) - 360 - - - - -   Total Laps Walked - 4 - - - - -   Final Partial Lap Distance (feet) - 100 - - - - -   Total Distance Feet (Calculated) - 900 - - - - -   Total Distance Meters (Calculated) - 274.32 - - - - -   Predicted Distance Meters (Calculated) 418.79 418.79 417.3 - - - 412.04   Percentage of Predicted (Calculated) - 65.5 - - - - -   Peak VO2 (Calculated) - 12.21 - - - - -   Mets - 3.49 - - - - -   Has The Patient Had a Previous Six Minute Walk Test? - No - - - - -   Oxygen Qualification? - No - - - - -     No flowsheet data found.    CT Chest without Contrast    Anatomical Region Laterality Modality   Chest -- Computed Tomography     Impression    1. Increase in a small pericardial effusion.   2. Mild emphysematous change.   3. No suspicious nodule that would require follow-up. Stable tiny right upper lobe micronodule.  Narrative    CT CHEST WO CONTRAST     CLINICAL INDICATION: tob abuse emphysema     COMPARISON: 2/4/2019     TECHNIQUE: CT scan of the chest was performed without IV contrast. Automated exposure control was utilized for dose reduction.     FINDINGS: There is mild centrilobular and paraseptal emphysema in the upper lung zones. No focal airspace consolidation. There is minimal chronic scarring in the right middle lobe and lingula.     There is a stable 3 mm micronodule within the right upper lobe on image 26. No suspicious nodule.     No pleural effusion. There is a small pericardial effusion, increased since 2/4/2019. No evidence of mediastinal adenopathy. No adrenal mass.  Procedure  Note    Mick Mott MD - 09/24/2019   Formatting of this note might be different from the original.   CT CHEST WO CONTRAST     CLINICAL INDICATION: tob abuse emphysema     COMPARISON: 2/4/2019     TECHNIQUE: CT scan of the chest was performed without IV contrast. Automated exposure control was utilized for dose reduction.     FINDINGS: There is mild centrilobular and paraseptal emphysema in the upper lung zones. No focal airspace consolidation. There is minimal chronic scarring in the right middle lobe and lingula.     There is a stable 3 mm micronodule within the right upper lobe on image 26. No suspicious nodule.     No pleural effusion. There is a small pericardial effusion, increased since 2/4/2019. No evidence of mediastinal adenopathy. No adrenal mass.     IMPRESSION:   1. Increase in a small pericardial effusion.   2. Mild emphysematous change.   3. No suspicious nodule that would require follow-up. Stable tiny right upper lobe micronodule.  Exam End: 09/24/19 13:58 Last Resulted: 09/24/19 14:24   [facility] Subsidiaries and Affiliates  Result Received: 01/17/23 10:23    View Encounter             Assessment:       1. Nicotine dependence, cigarettes, in remission    2. Pulmonary emphysema, unspecified emphysema type             Outpatient Encounter Medications as of 4/3/2023   Medication Sig Dispense Refill    albuterol (PROVENTIL) 2.5 mg /3 mL (0.083 %) nebulizer solution Take 3 mLs (2.5 mg total) by nebulization every 4 to 6 hours as needed for Wheezing or Shortness of Breath. 360 mL 11    albuterol (PROVENTIL/VENTOLIN HFA) 90 mcg/actuation inhaler Inhale 2 puffs into the lungs every 4 (four) hours as needed for Wheezing or Shortness of Breath. Rescue 54 g 3    amLODIPine (NORVASC) 10 MG tablet Take 10 mg by mouth once daily.      aspirin (ECOTRIN) 81 MG EC tablet Take 1 tablet (81 mg total) by mouth once daily. 90 tablet 3     atorvastatin (LIPITOR) 80 MG tablet Take 1 tablet (80 mg total) by mouth every evening. 30 tablet 11    clopidogreL (PLAVIX) 75 mg tablet TAKE 1 TABLET EVERY DAY 90 tablet 3    ezetimibe (ZETIA) 10 mg tablet ezetimibe 10 mg tablet      fluticasone-salmeterol diskus inhaler 250-50 mcg Inhale 1 puff into the lungs 2 (two) times daily. Wash out mouth after use. 180 each 3    furosemide (LASIX) 20 MG tablet Take 1 tablet (20 mg total) by mouth daily as needed (swelling). 30 tablet 11    ibuprofen (ADVIL,MOTRIN) 800 MG tablet ibuprofen 800 mg tablet      lisinopriL-hydrochlorothiazide (PRINZIDE,ZESTORETIC) 20-12.5 mg per tablet Take 1 tablet by mouth once daily.      metFORMIN (GLUCOPHAGE-XR) 500 MG ER 24hr tablet metformin  mg tablet,extended release 24 hr      metoprolol succinate (TOPROL-XL) 25 MG 24 hr tablet TAKE 1 TABLET EVERY DAY 90 tablet 1    sertraline (ZOLOFT) 100 MG tablet Take 100 mg by mouth once daily.      gabapentin (NEURONTIN) 300 MG capsule Take 1 capsule (300 mg total) by mouth 2 (two) times daily. 60 capsule 11    hydrOXYzine pamoate (VISTARIL) 25 MG Cap hydroxyzine pamoate 25 mg capsule      loratadine (CLARITIN) 10 mg tablet loratadine 10 mg tablet   TAKE ONE Tablet BY MOUTH ONCE DAILY AS NEEDED FOR ITCHING AND congestion      LORazepam (ATIVAN) 2 MG Tab Take 2 mg by mouth 2 (two) times daily as needed.      ondansetron (ZOFRAN-ODT) 4 MG TbDL Take 1 tablet (4 mg total) by mouth every 12 (twelve) hours as needed (vomiting). (Patient not taking: Reported on 4/3/2023) 12 tablet 0    [DISCONTINUED] azithromycin (ZITHROMAX Z-MONTSE) 250 MG tablet 500 mg on day 1 (two tablets) followed by 250 mg once daily on days 2-5 6 tablet 0    [DISCONTINUED] HYDROcodone-acetaminophen (NORCO) 5-325 mg per tablet Take 1 tablet by mouth every 6 (six) hours as needed for Pain. 12 tablet 0    [DISCONTINUED] levoFLOXacin (LEVAQUIN) 750 MG tablet Take 1 tablet (750 mg total) by mouth once daily. 5 tablet 0     [DISCONTINUED] predniSONE (DELTASONE) 20 MG tablet Prednisone 60 mg/ day for 3 days, 40 mg/day for 3 days,20 mg/ day for 3 days, (1/2 tablet )10 mg a day for 3 days. 20 tablet 0     No facility-administered encounter medications on file as of 4/3/2023.     Orders Placed This Encounter   Procedures    CT Chest Lung Screening Low Dose     Standing Status:   Future     Standing Expiration Date:   4/3/2024     Order Specific Question:   Is there documentation of shared decision making for this lung screening exam?     Answer:   Yes     Order Specific Question:   Is the patient a current smoker?     Answer:   No     Order Specific Question:   How many years has the patient quit smoking?     Answer:   3     Order Specific Question:   Does the patient have a 20-pack/year or greater smoke history?     Answer:   Yes     Order Specific Question:   Is the patient between the age 50-80 years old?     Answer:   Yes     Order Specific Question:   Does the patient show any signs or symptoms of lung cancer?     Answer:   No     Order Specific Question:   Is this the first (baseline) CT or an annual exam?     Answer:   Annual [2]     Order Specific Question:   May the Radiologist modify the order per protocol to meet the clinical needs of the patient?     Answer:   Yes     Order Specific Question:   Is this a low dose screening chest CT?     Answer:   Yes     Plan:       Requested Prescriptions      No prescriptions requested or ordered in this encounter     Nicotine dependence, cigarettes, in remission  -     CT Chest Lung Screening Low Dose; Future; Expected date: 04/03/2023    Pulmonary emphysema, unspecified emphysema type  -     CT Chest Lung Screening Low Dose; Future; Expected date: 04/03/2023      Follow up in about 1 year (around 4/3/2024) for Review CT/PET - on return visit.    MEDICAL DECISION MAKING: Moderate to high complexity.  Overall, the multiple problems listed are of moderate to high severity that may impact  quality of life and activities of daily living. Side effects of medications, treatment plan as well as options and alternatives reviewed and discussed with patient. There was counseling of patient concerning these issues.    Total time spent in counseling and coordination of care - 60  minutes of total time spent on the encounter, which includes face to face time and non-face to face time preparing to see the patient (eg, review of tests), Obtaining and/or reviewing separately obtained history, Documenting clinical information in the electronic or other health record, Independently interpreting results (not separately reported) and communicating results to the patient/family/caregiver, or Care coordination (not separately reported).    Time was used in discussion of prognosis, risks, benefits of treatment, instructions and compliance with regimen . Discussion with other physicians and/or health care providers - home health or for use of durable medical equipment (oxygen, nebulizers, CPAP, BiPAP) occurred.

## 2023-04-03 NOTE — PROCEDURES
"[facility]  Six Minute Walk     SUMMARY     Ordering Provider: Dr Larios   Interpreting Provider: Dr Larios  Performing nurse/tech/RT: Ryan Forrester RRT  Diagnosis: Emphysema  Height: 5' 2" (157.5 cm)  Weight: 84.4 kg (186 lb 1.1 oz)  BMI (Calculated): 34   Patient Race:             Phase Oxygen Assessment Supplemental O2 Heart   Rate Blood Pressure Wade Dyspnea Scale Rating   Resting 96 % Room Air 72 bpm 138/65 2   Exercise        Minute        1 98 % Room Air 105 bpm     2 97 % Room Air 104 bpm     3 97 % Room Air 108 bpm     4 99 % Room Air 102 bpm     5 97 % Room Air 104 bpm     6  100 % Room Air 100 bpm 144/70 4   Recovery        Minute        1 97 % Room Air 83 bpm     2 99 % Room Air 80 bpm     3 99 % Room Air 78 bpm     4 98 % Room Air 79 bpm 136/65 2     Six Minute Walk Summary  6MWT Status: completed without stopping  Patient Reported: No complaints     Interpretation:  Did the patient stop or pause?: No                                         Total Time Walked (Calculated): 360 seconds  Final Partial Lap Distance (feet): 100 feet  Total Distance Meters (Calculated): 274.32 meters  Predicted Distance Meters (Calculated): 418.79 meters  Percentage of Predicted (Calculated): 65.5  Peak VO2 (Calculated): 12.21  Mets: 3.49  Has The Patient Had a Previous Six Minute Walk Test?: No       Previous 6MWT Results  Has The Patient Had a Previous Six Minute Walk Test?: No      Interpretation:  Total distance walked in six minutes is mildly reduced indicating a reduction in overall  functional capacity. The patient did not meet criteria for supplemental oxygen prescription.  Clinical correlation suggested.  [] Mild exercise-induced hypoxemia described as an arterial oxygen saturation of 93-95% (with a fall of 3-4% with exercise),   [] Moderate exercise-induced hypoxemia as a fall in oxygen saturation to  89-93% (with a fall of 3-4 % with exercise)  [] Severe exercise induced hypoxemia " as < 89% O2 saturation (88% and below).  Medicare Criteria for Oxygen prescription comments: When arterial oxygen saturation is at or below 88% during exercise (severe exercise induced hypoxemia) then the patient falls under   Details about Medicare Group Criteria coverage can be found at http://[email or web address].[facility].gov/manuals/downloads/     Joshua Larios MD

## 2023-07-07 ENCOUNTER — PATIENT MESSAGE (OUTPATIENT)
Dept: INFECTIOUS DISEASES | Facility: CLINIC | Age: 68
End: 2023-07-07
Payer: MEDICARE

## 2023-08-05 ENCOUNTER — HOSPITAL ENCOUNTER (EMERGENCY)
Facility: HOSPITAL | Age: 68
Discharge: HOME | End: 2023-08-05
Attending: FAMILY MEDICINE
Payer: MEDICARE

## 2023-08-05 VITALS
TEMPERATURE: 98 F | OXYGEN SATURATION: 100 % | WEIGHT: 186.06 LBS | DIASTOLIC BLOOD PRESSURE: 75 MMHG | BODY MASS INDEX: 34.03 KG/M2 | SYSTOLIC BLOOD PRESSURE: 140 MMHG | RESPIRATION RATE: 20 BRPM | HEART RATE: 84 BPM

## 2023-08-05 DIAGNOSIS — S62.514A CLOSED NONDISPLACED FRACTURE OF PROXIMAL PHALANX OF RIGHT THUMB, INITIAL ENCOUNTER: Primary | ICD-10-CM

## 2023-08-05 PROCEDURE — 25000003 PHARM REV CODE 250: Performed by: REGISTERED NURSE

## 2023-08-05 PROCEDURE — 99283 EMERGENCY DEPT VISIT LOW MDM: CPT

## 2023-08-05 PROCEDURE — 29125 APPL SHORT ARM SPLINT STATIC: CPT | Mod: RT

## 2023-08-05 RX ORDER — TRAMADOL HYDROCHLORIDE 50 MG/1
50 TABLET ORAL
Status: COMPLETED | OUTPATIENT
Start: 2023-08-05 | End: 2023-08-05

## 2023-08-05 RX ORDER — TRAMADOL HYDROCHLORIDE 50 MG/1
50 TABLET ORAL EVERY 6 HOURS PRN
Qty: 12 TABLET | Refills: 0 | Status: SHIPPED | OUTPATIENT
Start: 2023-08-05 | End: 2023-08-08

## 2023-08-05 RX ADMIN — TRAMADOL HYDROCHLORIDE 50 MG: 50 TABLET, COATED ORAL at 09:08

## 2023-08-07 ENCOUNTER — TELEPHONE (OUTPATIENT)
Dept: ORTHOPEDICS | Facility: CLINIC | Age: 68
End: 2023-08-07
Payer: MEDICARE

## 2023-08-07 PROBLEM — N63.20 MASS OF MULTIPLE SITES OF LEFT BREAST: Status: ACTIVE | Noted: 2023-08-07

## 2023-08-07 PROBLEM — N63.10 MASS OF MULTIPLE SITES OF RIGHT BREAST: Status: ACTIVE | Noted: 2023-08-07

## 2023-08-07 NOTE — TELEPHONE ENCOUNTER
----- Message from Aaron Ospina sent at 8/7/2023  3:25 PM CDT -----  Regarding: ER follow up  Hey,    This patient has a comminuted 1st proximal phalanx fracture, follow up from [facility] ED.  Please contact to schedule appt with Ortho Trauma.    Thanks!    ----- Message -----  From: Gretta Schroeder  Sent: 8/7/2023   3:23 PM CDT  To: [facility] Ortho Clinical Staff    Pt broke her right thumb trying to break her fall. She would like to schedule an appt following ER visit. Call the pt back at [phone]. thx. EL

## 2023-08-07 NOTE — PROGRESS NOTES
[facility]  MD Maureen Mora, NP-C    Chief Complaint:   Jacqueline Luna is a 67 y.o. female presenting today to discuss her imaging that showed bilateral breast masses that were thought to be related to multiple cysts vs. A small fibroadenoma. They all have benign characteristics and no further work up was recommended.  She presents today to discuss her breast cysts and her bilateral breast pain. She states she's had cyst for years.  History of Present Illness:   Mrs. Jacqueline Luna presents on 08/08/2023 due to bilateral breast masses that appear  to be consistent with benign cysts vs. A small fibroadenoma. No further work up was recommended. She presents to discuss her breast cysts and bilateral breast pain.  MD::: Marvin Miller MD.    Past Medical History:   Diagnosis Date    Anxiety disorder, unspecified     COPD (chronic obstructive pulmonary disease)     Depression     Family history of malignant neoplasm of breast 8/8/2023    Fibrocystic breast     HTN (hypertension)     Hypercholesteremia     Mass of multiple sites of left breast 08/07/2023    Mass of multiple sites of right breast 08/07/2023    Mastodynia 8/8/2023    Mild intermittent asthma, uncomplicated     Type 2 diabetes mellitus without complications       Past Surgical History:   Procedure Laterality Date    CORONARY ANGIOGRAPHY N/A 3/12/2021    Procedure: ANGIOGRAM, CORONARY ARTERY;  Surgeon: Shruti Harrison MD;  Location: [facility] CATH LAB;  Service: Cardiology;  Laterality: N/A;    CORONARY ANGIOPLASTY WITH STENT PLACEMENT N/A 3/12/2021    Procedure: Angioplasty, Coronary Artery, With Stent Insertion;  Surgeon: Shruti Harrison MD;  Location: [facility] CATH LAB;  Service: Cardiology;  Laterality: N/A;    LEFT HEART CATHETERIZATION Left 3/12/2021    Procedure: CATHETERIZATION, HEART, LEFT;  Surgeon: Shruti Harrison MD;  Location: [facility] CATH LAB;  Service: Cardiology;  Laterality: Left;         Current Outpatient Medications:     blood sugar diagnostic (TRUE METRIX GLUCOSE TEST STRIP) Strp, 2 (two) times daily., Disp: , Rfl:     blood-glucose meter kit, Use as instructed, Disp: , Rfl:     lancets (TRUEPLUS LANCETS) 33 gauge Misc, 2 (two) times daily., Disp: , Rfl:     albuterol (PROVENTIL) 2.5 mg /3 mL (0.083 %) nebulizer solution, Take 3 mLs (2.5 mg total) by nebulization every 4 to 6 hours as needed for Wheezing or Shortness of Breath., Disp: 360 mL, Rfl: 11    albuterol (PROVENTIL/VENTOLIN HFA) 90 mcg/actuation inhaler, Inhale 2 puffs into the lungs every 4 (four) hours as needed for Wheezing or Shortness of Breath. Rescue, Disp: 54 g, Rfl: 3    amLODIPine (NORVASC) 10 MG tablet, Take 10 mg by mouth once daily., Disp: , Rfl:     aspirin (ECOTRIN) 81 MG EC tablet, Take 1 tablet (81 mg total) by mouth once daily., Disp: 90 tablet, Rfl: 3    atorvastatin (LIPITOR) 80 MG tablet, Take 1 tablet (80 mg total) by mouth every evening., Disp: 30 tablet, Rfl: 11    clopidogreL (PLAVIX) 75 mg tablet, TAKE 1 TABLET EVERY DAY, Disp: 90 tablet, Rfl: 3    ezetimibe (ZETIA) 10 mg tablet, ezetimibe 10 mg tablet, Disp: , Rfl:     fluticasone-salmeterol diskus inhaler 250-50 mcg, Inhale 1 puff into the lungs 2 (two) times daily. Wash out mouth after use., Disp: 180 each, Rfl: 3    furosemide (LASIX) 20 MG tablet, Take 1 tablet (20 mg total) by mouth daily as needed (swelling)., Disp: 30 tablet, Rfl: 11    ibuprofen (ADVIL,MOTRIN) 800 MG tablet, ibuprofen 800 mg tablet, Disp: , Rfl:     lisinopriL-hydrochlorothiazide (PRINZIDE,ZESTORETIC) 20-12.5 mg per tablet, Take 1 tablet by mouth once daily., Disp: , Rfl:     metFORMIN (GLUCOPHAGE-XR) 500 MG ER 24hr tablet, metformin  mg tablet,extended release 24 hr, Disp: , Rfl:     metoprolol succinate (TOPROL-XL) 25 MG 24 hr tablet, TAKE 1 TABLET EVERY DAY, Disp: 90 tablet, Rfl: 1    sertraline (ZOLOFT) 100 MG tablet, Take 100 mg by mouth once daily., Disp: , Rfl:     TRUE  METRIX AIR GLUCOSE METER kit, , Disp: , Rfl:     TRUE METRIX GLUCOSE TEST STRIP Strp, SMARTSIG:Via Meter, Disp: , Rfl:     TRUEPLUS LANCETS 33 gauge Misc, , Disp: , Rfl:    Review of patient's allergies indicates:  No Known Allergies   Social History     Tobacco Use    Smoking status: Former     Current packs/day: 0.00     Average packs/day: 0.5 packs/day for 35.2 years (17.6 ttl pk-yrs)     Types: Cigarettes     Start date: 1986     Quit date: 3/5/2021     Years since quittin.4    Smokeless tobacco: Not on file   Substance Use Topics    Alcohol use: Not Currently      Family History   Problem Relation Age of Onset    Diabetes Mother     Hypertension Mother     Cancer Mother     Cancer Father     COPD Father     Breast cancer Sister         dx 40's    Breast cancer Sister 74    Breast cancer Maternal Aunt     Breast cancer Maternal Aunt     Ovarian cancer Neg Hx         Review of Systems   Integumentary:  Negative for color change, rash, mole/lesion, breast mass, breast discharge and breast tenderness.   Breast: Negative for mass and tenderness       Physical Exam   Constitutional: She appears well-developed. She is cooperative.   HENT:   Head: Normocephalic.   Cardiovascular:  Normal rate and regular rhythm.            Pulmonary/Chest: She exhibits no tenderness and no bony tenderness. Right breast exhibits no mass, no nipple discharge, no skin change and no tenderness. Left breast exhibits no mass, no nipple discharge, no skin change and no tenderness.   Abdominal: Soft. Normal appearance.   Musculoskeletal: Lymphadenopathy:      Upper Body:      Right upper body: No supraclavicular or axillary adenopathy.      Left upper body: No supraclavicular or axillary adenopathy.     Neurological: She is alert.   Skin: No rash noted.      Mammogram: 23 Screening- Once again there is scattered bilateral well circumscribed nodular opacities in both breast, generally slightly progressive, overall, since the prior  study. Most likely related to multiple cysts. A small underlying fibroadenoma cannot be completely excluded but these lesions all have a benign appearance. No focal dominant mass, malignant appearing microcalcifications, or architectural distortion is seen in either breast. Impression: No mammographic evidence of malignancy in either breast. BI-RADS: 2        1. Mass of multiple sites of right breast  Assessment & Plan:  We reviewed our findings today and her questions were answered.  She understands that her imaging and exams have remained stable (and show nothing concerning).  She is comfortable being followed in a conservative fashion.      She understands the importance of monthly self-breast examination and knows to report any and all changes as they occur.        2. Mass of multiple sites of left breast  Assessment & Plan:  Same as above      3. Family history of malignant neoplasm of breast  Assessment & Plan:  Long discussion with her about her family history and the increased risk that is attributable.  We discussed Genetic Counseling/Testing at length and information was given to address these.   Her Lifetime Risk is only 5.86% based on the [employer] Tool. The population risk is 5.48%- so she not at a very high risk.   Her family and personal history were confirmed, and I believe this risk to be true based on its limitations.  She knows that there are certain elements that increase/decrease her risk that are not amendable to .  This risk is less than 20% and does not meet the threshold for additional annual imaging with MRI.  She will also not need to be followed in the High-Risk setting and can be seen on an annual basis.      4. Mastodynia  Assessment & Plan:  We discussed our fibrocystic mastopathy protocol in detail. She knows that if she follows this protocol - that her symptoms should improve.  We discussed how breast pain is usually not associated with breast cancer, however, pain can  be the presenting symptom with some cancers (but this could be coincidental). Still, if her pain does not improve in 8-12 weeks she should call us back for additional recommendations.             Medical Decision Making:  It is my impression that this patient suffers all conditions contained in this medical document.  Each of these conditions did affect our plan of care and my medical decision making today.  It is my opinion that the medical decision making concerning this patient was of moderate difficulty based on the aforementioned conditions.  Any further recommendations will be communicated to the patient by me.  I have reviewed and verified her allergies, list of medications, medical and surgical histories, social history, and a pertinent review of symptoms.      Follow up:  6 months and prn    For:  Physical Examination

## 2023-08-07 NOTE — TELEPHONE ENCOUNTER
----- Message from Abril Nolan LPN sent at 8/7/2023  9:56 AM CDT -----  Regarding: workque referral  ED on 8/5/23 for Closed nondisplaced fracture of proximal phalanx of right thumb     Please reach out to get patient scheduled     I will cancel referral in the workque     Thank you

## 2023-08-08 ENCOUNTER — OFFICE VISIT (OUTPATIENT)
Dept: SURGERY | Facility: CLINIC | Age: 68
End: 2023-08-08
Payer: MEDICARE

## 2023-08-08 VITALS — WEIGHT: 187 LBS | BODY MASS INDEX: 35.3 KG/M2 | HEIGHT: 61 IN

## 2023-08-08 DIAGNOSIS — N64.4 MASTODYNIA: ICD-10-CM

## 2023-08-08 DIAGNOSIS — N63.20 MASS OF MULTIPLE SITES OF LEFT BREAST: ICD-10-CM

## 2023-08-08 DIAGNOSIS — Z80.3 FAMILY HISTORY OF MALIGNANT NEOPLASM OF BREAST: ICD-10-CM

## 2023-08-08 DIAGNOSIS — N63.10 MASS OF MULTIPLE SITES OF RIGHT BREAST: ICD-10-CM

## 2023-08-08 PROCEDURE — 3008F BODY MASS INDEX DOCD: CPT | Mod: CPTII,S$GLB,, | Performed by: NURSE PRACTITIONER

## 2023-08-08 PROCEDURE — 4010F PR ACE/ARB THEARPY RXD/TAKEN: ICD-10-PCS | Mod: CPTII,S$GLB,, | Performed by: NURSE PRACTITIONER

## 2023-08-08 PROCEDURE — 3008F PR BODY MASS INDEX (BMI) DOCUMENTED: ICD-10-PCS | Mod: CPTII,S$GLB,, | Performed by: NURSE PRACTITIONER

## 2023-08-08 PROCEDURE — 4010F ACE/ARB THERAPY RXD/TAKEN: CPT | Mod: CPTII,S$GLB,, | Performed by: NURSE PRACTITIONER

## 2023-08-08 PROCEDURE — 1160F RVW MEDS BY RX/DR IN RCRD: CPT | Mod: CPTII,S$GLB,, | Performed by: NURSE PRACTITIONER

## 2023-08-08 PROCEDURE — 99203 OFFICE O/P NEW LOW 30 MIN: CPT | Mod: S$GLB,,, | Performed by: NURSE PRACTITIONER

## 2023-08-08 PROCEDURE — 1160F PR REVIEW ALL MEDS BY PRESCRIBER/CLIN PHARMACIST DOCUMENTED: ICD-10-PCS | Mod: CPTII,S$GLB,, | Performed by: NURSE PRACTITIONER

## 2023-08-08 PROCEDURE — 1159F MED LIST DOCD IN RCRD: CPT | Mod: CPTII,S$GLB,, | Performed by: NURSE PRACTITIONER

## 2023-08-08 PROCEDURE — 99203 PR OFFICE/OUTPT VISIT, NEW, LEVL III, 30-44 MIN: ICD-10-PCS | Mod: S$GLB,,, | Performed by: NURSE PRACTITIONER

## 2023-08-08 PROCEDURE — 1159F PR MEDICATION LIST DOCUMENTED IN MEDICAL RECORD: ICD-10-PCS | Mod: CPTII,S$GLB,, | Performed by: NURSE PRACTITIONER

## 2023-08-08 RX ORDER — BLOOD-GLUCOSE METER
EACH MISCELLANEOUS
COMMUNITY
Start: 2023-02-24 | End: 2024-03-02

## 2023-08-08 RX ORDER — BUDESONIDE AND FORMOTEROL FUMARATE DIHYDRATE 160; 4.5 UG/1; UG/1
AEROSOL RESPIRATORY (INHALATION)
COMMUNITY
End: 2023-08-08

## 2023-08-08 RX ORDER — CALCIUM CITRATE/VITAMIN D3 200MG-6.25
TABLET ORAL
COMMUNITY
Start: 2023-08-02

## 2023-08-08 RX ORDER — LANCETS 33 GAUGE
EACH MISCELLANEOUS
COMMUNITY
Start: 2023-08-02

## 2023-08-08 RX ORDER — LANCETS 33 GAUGE
EACH MISCELLANEOUS 2 TIMES DAILY
COMMUNITY
Start: 2023-05-11 | End: 2024-03-02

## 2023-08-08 RX ORDER — INSULIN PUMP SYRINGE, 3 ML
EACH MISCELLANEOUS
COMMUNITY
Start: 2023-02-24 | End: 2024-02-24

## 2023-08-08 NOTE — ASSESSMENT & PLAN NOTE
Long discussion with her about her family history and the increased risk that is attributable.  We discussed Genetic Counseling/Testing at length and information was given to address these.   Her Lifetime Risk is only 5.86% based on the [employer] Tool. The population risk is 5.48%- so she not at a very high risk.   Her family and personal history were confirmed, and I believe this risk to be true based on its limitations.  She knows that there are certain elements that increase/decrease her risk that are not amendable to .  This risk is less than 20% and does not meet the threshold for additional annual imaging with MRI.  She will also not need to be followed in the High-Risk setting and can be seen on an annual basis.

## 2023-08-09 ENCOUNTER — OFFICE VISIT (OUTPATIENT)
Dept: CARDIOLOGY | Facility: CLINIC | Age: 68
End: 2023-08-09
Payer: MEDICARE

## 2023-08-09 ENCOUNTER — HOSPITAL ENCOUNTER (OUTPATIENT)
Dept: CARDIOLOGY | Facility: HOSPITAL | Age: 68
Discharge: HOME | End: 2023-08-09
Attending: ORTHOPAEDIC SURGERY
Payer: MEDICARE

## 2023-08-09 ENCOUNTER — HOSPITAL ENCOUNTER (OUTPATIENT)
Dept: RADIOLOGY | Facility: HOSPITAL | Age: 68
Discharge: HOME | End: 2023-08-09
Attending: ORTHOPAEDIC SURGERY
Payer: MEDICARE

## 2023-08-09 ENCOUNTER — OFFICE VISIT (OUTPATIENT)
Dept: ORTHOPEDICS | Facility: CLINIC | Age: 68
End: 2023-08-09
Payer: MEDICARE

## 2023-08-09 VITALS
HEIGHT: 61 IN | SYSTOLIC BLOOD PRESSURE: 130 MMHG | HEART RATE: 73 BPM | DIASTOLIC BLOOD PRESSURE: 70 MMHG | BODY MASS INDEX: 34.75 KG/M2 | WEIGHT: 184.06 LBS | OXYGEN SATURATION: 98 %

## 2023-08-09 VITALS — WEIGHT: 187 LBS | HEIGHT: 61 IN | BODY MASS INDEX: 35.3 KG/M2

## 2023-08-09 DIAGNOSIS — Z01.818 PREOP TESTING: ICD-10-CM

## 2023-08-09 DIAGNOSIS — S62.501B OPEN NONDISPLACED FRACTURE OF PHALANX OF RIGHT THUMB, UNSPECIFIED PHALANX, INITIAL ENCOUNTER: Primary | ICD-10-CM

## 2023-08-09 DIAGNOSIS — I73.9 CLAUDICATION: ICD-10-CM

## 2023-08-09 DIAGNOSIS — E66.01 MORBID OBESITY: ICD-10-CM

## 2023-08-09 DIAGNOSIS — I10 ESSENTIAL HYPERTENSION: Primary | ICD-10-CM

## 2023-08-09 DIAGNOSIS — E78.2 MIXED HYPERLIPIDEMIA: ICD-10-CM

## 2023-08-09 DIAGNOSIS — Z95.5 STATUS POST PRIMARY ANGIOPLASTY WITH CORONARY STENT: ICD-10-CM

## 2023-08-09 DIAGNOSIS — Z01.818 PREOP TESTING: Primary | ICD-10-CM

## 2023-08-09 PROCEDURE — 4010F PR ACE/ARB THEARPY RXD/TAKEN: ICD-10-PCS | Mod: CPTII,S$GLB,, | Performed by: ORTHOPAEDIC SURGERY

## 2023-08-09 PROCEDURE — 3075F PR MOST RECENT SYSTOLIC BLOOD PRESS GE 130-139MM HG: ICD-10-PCS | Mod: CPTII,S$PBB,, | Performed by: INTERNAL MEDICINE

## 2023-08-09 PROCEDURE — 4010F ACE/ARB THERAPY RXD/TAKEN: CPT | Mod: CPTII,S$PBB,, | Performed by: INTERNAL MEDICINE

## 2023-08-09 PROCEDURE — 99214 OFFICE O/P EST MOD 30 MIN: CPT | Mod: S$PBB,,, | Performed by: INTERNAL MEDICINE

## 2023-08-09 PROCEDURE — 1125F PR PAIN SEVERITY QUANTIFIED, PAIN PRESENT: ICD-10-PCS | Mod: CPTII,S$GLB,, | Performed by: ORTHOPAEDIC SURGERY

## 2023-08-09 PROCEDURE — 1126F AMNT PAIN NOTED NONE PRSNT: CPT | Mod: CPTII,S$PBB,, | Performed by: INTERNAL MEDICINE

## 2023-08-09 PROCEDURE — 99214 PR OFFICE/OUTPT VISIT, EST, LEVL IV, 30-39 MIN: ICD-10-PCS | Mod: S$PBB,,, | Performed by: INTERNAL MEDICINE

## 2023-08-09 PROCEDURE — 3288F FALL RISK ASSESSMENT DOCD: CPT | Mod: CPTII,S$GLB,, | Performed by: ORTHOPAEDIC SURGERY

## 2023-08-09 PROCEDURE — 3075F SYST BP GE 130 - 139MM HG: CPT | Mod: CPTII,S$PBB,, | Performed by: INTERNAL MEDICINE

## 2023-08-09 PROCEDURE — 4010F PR ACE/ARB THEARPY RXD/TAKEN: ICD-10-PCS | Mod: CPTII,S$PBB,, | Performed by: INTERNAL MEDICINE

## 2023-08-09 PROCEDURE — 1159F MED LIST DOCD IN RCRD: CPT | Mod: CPTII,S$GLB,, | Performed by: ORTHOPAEDIC SURGERY

## 2023-08-09 PROCEDURE — 1101F PT FALLS ASSESS-DOCD LE1/YR: CPT | Mod: CPTII,S$PBB,, | Performed by: INTERNAL MEDICINE

## 2023-08-09 PROCEDURE — 99999 PR PBB SHADOW E&M-EST. PATIENT-LVL III: ICD-10-PCS | Mod: PBBFAC,,, | Performed by: ORTHOPAEDIC SURGERY

## 2023-08-09 PROCEDURE — 1160F PR REVIEW ALL MEDS BY PRESCRIBER/CLIN PHARMACIST DOCUMENTED: ICD-10-PCS | Mod: CPTII,S$GLB,, | Performed by: ORTHOPAEDIC SURGERY

## 2023-08-09 PROCEDURE — 3288F PR FALLS RISK ASSESSMENT DOCUMENTED: ICD-10-PCS | Mod: CPTII,S$PBB,, | Performed by: INTERNAL MEDICINE

## 2023-08-09 PROCEDURE — 3008F BODY MASS INDEX DOCD: CPT | Mod: CPTII,S$GLB,, | Performed by: ORTHOPAEDIC SURGERY

## 2023-08-09 PROCEDURE — 1125F AMNT PAIN NOTED PAIN PRSNT: CPT | Mod: CPTII,S$GLB,, | Performed by: ORTHOPAEDIC SURGERY

## 2023-08-09 PROCEDURE — 99999 PR PBB SHADOW E&M-EST. PATIENT-LVL III: CPT | Mod: PBBFAC,,, | Performed by: ORTHOPAEDIC SURGERY

## 2023-08-09 PROCEDURE — 71046 X-RAY EXAM CHEST 2 VIEWS: CPT | Mod: TC

## 2023-08-09 PROCEDURE — 3288F PR FALLS RISK ASSESSMENT DOCUMENTED: ICD-10-PCS | Mod: CPTII,S$GLB,, | Performed by: ORTHOPAEDIC SURGERY

## 2023-08-09 PROCEDURE — 3008F PR BODY MASS INDEX (BMI) DOCUMENTED: ICD-10-PCS | Mod: CPTII,S$PBB,, | Performed by: INTERNAL MEDICINE

## 2023-08-09 PROCEDURE — 99204 PR OFFICE/OUTPT VISIT, NEW, LEVL IV, 45-59 MIN: ICD-10-PCS | Mod: S$GLB,,, | Performed by: ORTHOPAEDIC SURGERY

## 2023-08-09 PROCEDURE — 3288F FALL RISK ASSESSMENT DOCD: CPT | Mod: CPTII,S$PBB,, | Performed by: INTERNAL MEDICINE

## 2023-08-09 PROCEDURE — 99999 PR PBB SHADOW E&M-EST. PATIENT-LVL III: ICD-10-PCS | Mod: PBBFAC,,, | Performed by: INTERNAL MEDICINE

## 2023-08-09 PROCEDURE — 1159F PR MEDICATION LIST DOCUMENTED IN MEDICAL RECORD: ICD-10-PCS | Mod: CPTII,S$GLB,, | Performed by: ORTHOPAEDIC SURGERY

## 2023-08-09 PROCEDURE — 1101F PR PT FALLS ASSESS DOC 0-1 FALLS W/OUT INJ PAST YR: ICD-10-PCS | Mod: CPTII,S$PBB,, | Performed by: INTERNAL MEDICINE

## 2023-08-09 PROCEDURE — 3008F BODY MASS INDEX DOCD: CPT | Mod: CPTII,S$PBB,, | Performed by: INTERNAL MEDICINE

## 2023-08-09 PROCEDURE — 93010 ELECTROCARDIOGRAM REPORT: CPT | Mod: ,,, | Performed by: INTERNAL MEDICINE

## 2023-08-09 PROCEDURE — 3078F PR MOST RECENT DIASTOLIC BLOOD PRESSURE < 80 MM HG: ICD-10-PCS | Mod: CPTII,S$PBB,, | Performed by: INTERNAL MEDICINE

## 2023-08-09 PROCEDURE — 4010F ACE/ARB THERAPY RXD/TAKEN: CPT | Mod: CPTII,S$GLB,, | Performed by: ORTHOPAEDIC SURGERY

## 2023-08-09 PROCEDURE — 93010 EKG 12-LEAD: ICD-10-PCS | Mod: ,,, | Performed by: INTERNAL MEDICINE

## 2023-08-09 PROCEDURE — 71046 X-RAY EXAM CHEST 2 VIEWS: CPT | Mod: 26,,, | Performed by: RADIOLOGY

## 2023-08-09 PROCEDURE — 1100F PTFALLS ASSESS-DOCD GE2>/YR: CPT | Mod: CPTII,S$GLB,, | Performed by: ORTHOPAEDIC SURGERY

## 2023-08-09 PROCEDURE — 99999 PR PBB SHADOW E&M-EST. PATIENT-LVL III: CPT | Mod: PBBFAC,,, | Performed by: INTERNAL MEDICINE

## 2023-08-09 PROCEDURE — 3078F DIAST BP <80 MM HG: CPT | Mod: CPTII,S$PBB,, | Performed by: INTERNAL MEDICINE

## 2023-08-09 PROCEDURE — 1100F PR PT FALLS ASSESS DOC 2+ FALLS/FALL W/INJURY/YR: ICD-10-PCS | Mod: CPTII,S$GLB,, | Performed by: ORTHOPAEDIC SURGERY

## 2023-08-09 PROCEDURE — 99213 OFFICE O/P EST LOW 20 MIN: CPT | Mod: PBBFAC,25 | Performed by: ORTHOPAEDIC SURGERY

## 2023-08-09 PROCEDURE — 3008F PR BODY MASS INDEX (BMI) DOCUMENTED: ICD-10-PCS | Mod: CPTII,S$GLB,, | Performed by: ORTHOPAEDIC SURGERY

## 2023-08-09 PROCEDURE — 93005 ELECTROCARDIOGRAM TRACING: CPT

## 2023-08-09 PROCEDURE — 71046 XR CHEST PA AND LATERAL PRE-OP: ICD-10-PCS | Mod: 26,,, | Performed by: RADIOLOGY

## 2023-08-09 PROCEDURE — 99204 OFFICE O/P NEW MOD 45 MIN: CPT | Mod: S$GLB,,, | Performed by: ORTHOPAEDIC SURGERY

## 2023-08-09 PROCEDURE — 1126F PR PAIN SEVERITY QUANTIFIED, NO PAIN PRESENT: ICD-10-PCS | Mod: CPTII,S$PBB,, | Performed by: INTERNAL MEDICINE

## 2023-08-09 PROCEDURE — 1160F RVW MEDS BY RX/DR IN RCRD: CPT | Mod: CPTII,S$GLB,, | Performed by: ORTHOPAEDIC SURGERY

## 2023-08-09 NOTE — H&P (VIEW-ONLY)
Subjective:     Patient ID: Jacqueline Luna is a 67 y.o. female.    Chief Complaint: Pain and Injury of the Right Hand      HPI:  The patient is a 67-year-old female who fell and injured her right thumb 08/05/2023.  She has an oblique fracture of the proximal phalanx right thumb, closed the and displaced that will need a closed reduction and pinning    Past Medical History:   Diagnosis Date    Anxiety disorder, unspecified     COPD (chronic obstructive pulmonary disease)     Depression     Family history of malignant neoplasm of breast 8/8/2023    Fibrocystic breast     HTN (hypertension)     Hypercholesteremia     Mass of multiple sites of left breast 08/07/2023    Mass of multiple sites of right breast 08/07/2023    Mastodynia 8/8/2023    Mild intermittent asthma, uncomplicated     Type 2 diabetes mellitus without complications      Past Surgical History:   Procedure Laterality Date    CORONARY ANGIOGRAPHY N/A 3/12/2021    Procedure: ANGIOGRAM, CORONARY ARTERY;  Surgeon: Shruti Harrison MD;  Location: [facility] CATH LAB;  Service: Cardiology;  Laterality: N/A;    CORONARY ANGIOPLASTY WITH STENT PLACEMENT N/A 3/12/2021    Procedure: Angioplasty, Coronary Artery, With Stent Insertion;  Surgeon: Shruti Harrison MD;  Location: [facility] CATH LAB;  Service: Cardiology;  Laterality: N/A;    LEFT HEART CATHETERIZATION Left 3/12/2021    Procedure: CATHETERIZATION, HEART, LEFT;  Surgeon: Shruti Harrison MD;  Location: [facility] CATH LAB;  Service: Cardiology;  Laterality: Left;     Family History   Problem Relation Age of Onset    Diabetes Mother     Hypertension Mother     Cancer Mother     Cancer Father     COPD Father     Breast cancer Sister         dx 40's    Breast cancer Sister 74    Breast cancer Maternal Aunt     Breast cancer Maternal Aunt     Ovarian cancer Neg Hx      Social History     Socioeconomic History    Marital status: Single   Tobacco Use    Smoking status: Former     Current packs/day: 0.00     Average  packs/day: 0.5 packs/day for 35.2 years (17.6 ttl pk-yrs)     Types: Cigarettes     Start date: 1986     Quit date: 3/5/2021     Years since quittin.4   Substance and Sexual Activity    Alcohol use: Not Currently    Drug use: Never    Sexual activity: Not Currently     Birth control/protection: Abstinence     Medication List with Changes/Refills   Current Medications    ALBUTEROL (PROVENTIL) 2.5 MG /3 ML (0.083 %) NEBULIZER SOLUTION    Take 3 mLs (2.5 mg total) by nebulization every 4 to 6 hours as needed for Wheezing or Shortness of Breath.    ALBUTEROL (PROVENTIL/VENTOLIN HFA) 90 MCG/ACTUATION INHALER    Inhale 2 puffs into the lungs every 4 (four) hours as needed for Wheezing or Shortness of Breath. Rescue    AMLODIPINE (NORVASC) 10 MG TABLET    Take 10 mg by mouth once daily.    ASPIRIN (ECOTRIN) 81 MG EC TABLET    Take 1 tablet (81 mg total) by mouth once daily.    ATORVASTATIN (LIPITOR) 80 MG TABLET    Take 1 tablet (80 mg total) by mouth every evening.    BLOOD SUGAR DIAGNOSTIC (TRUE METRIX GLUCOSE TEST STRIP) STRP    2 (two) times daily.    BLOOD-GLUCOSE METER KIT    Use as instructed    CLOPIDOGREL (PLAVIX) 75 MG TABLET    TAKE 1 TABLET EVERY DAY    EZETIMIBE (ZETIA) 10 MG TABLET    ezetimibe 10 mg tablet    FLUTICASONE-SALMETEROL DISKUS INHALER 250-50 MCG    Inhale 1 puff into the lungs 2 (two) times daily. Wash out mouth after use.    FUROSEMIDE (LASIX) 20 MG TABLET    Take 1 tablet (20 mg total) by mouth daily as needed (swelling).    IBUPROFEN (ADVIL,MOTRIN) 800 MG TABLET    ibuprofen 800 mg tablet    LANCETS (TRUEPLUS LANCETS) 33 GAUGE MISC    2 (two) times daily.    LISINOPRIL-HYDROCHLOROTHIAZIDE (PRINZIDE,ZESTORETIC) 20-12.5 MG PER TABLET    Take 1 tablet by mouth once daily.    METFORMIN (GLUCOPHAGE-XR) 500 MG ER 24HR TABLET    metformin  mg tablet,extended release 24 hr    METOPROLOL SUCCINATE (TOPROL-XL) 25 MG 24 HR TABLET    TAKE 1 TABLET EVERY DAY    SERTRALINE (ZOLOFT) 100 MG  TABLET    Take 100 mg by mouth once daily.    TRUE METRIX AIR GLUCOSE METER KIT        TRUE METRIX GLUCOSE TEST STRIP STRP    SMARTSIG:Via Meter    TRUEPLUS LANCETS 33 GAUGE MISC         Review of patient's allergies indicates:  No Known Allergies  Review of Systems   Constitutional: Negative for malaise/fatigue.   HENT:  Negative for hearing loss.    Eyes:  Negative for double vision and visual disturbance.   Cardiovascular:  Positive for chest pain.   Respiratory:  Negative for shortness of breath.    Endocrine: Negative for cold intolerance.   Hematologic/Lymphatic: Does not bruise/bleed easily.   Skin:  Negative for poor wound healing and suspicious lesions.   Musculoskeletal:  Negative for gout, joint pain and joint swelling.   Gastrointestinal:  Negative for nausea and vomiting.   Genitourinary:  Negative for dysuria.   Neurological:  Negative for numbness, paresthesias and sensory change.   Psychiatric/Behavioral:  Negative for depression, memory loss and substance abuse. The patient is not nervous/anxious.    Allergic/Immunologic: Negative for persistent infections.       Objective:   Body mass index is 35.33 kg/m².  There were no vitals filed for this visit.             General    Constitutional: She is oriented to person, place, and time. She appears well-developed and well-nourished. No distress.   HENT:   Head: Normocephalic.   Mouth/Throat: Oropharynx is clear and moist.   Eyes: EOM are normal.   Cardiovascular:  Normal rate.            Pulmonary/Chest: Effort normal.   Abdominal: Soft.   Neurological: She is alert and oriented to person, place, and time. No cranial nerve deficit.   Psychiatric: She has a normal mood and affect.             Right Hand/Wrist Exam     Inspection   Scars: Wrist - absent Hand -  absent  Effusion: Wrist - absent Hand -  present    Pain   Hand - The patient exhibits pain of the thumb MCP and thumb IP.    Other     Neuorologic Exam    Median Distribution: normal  Ulnar  Distribution: normal  Radial Distribution: normal    Comments:  The patient has a spiral fracture displaced right thumb proximal phalanx, closed          Vascular Exam       Capillary Refill  Right Hand: normal capillary refill          Relevant imaging results reviewed and interpreted by me, discussed with the patient and / or family today radiographs of the right thumb showed a spiral fracture proximal phalanx, extra-articular with displacement  Assessment:     Spiral fracture right thumb proximal phalanx, closed, displaced, extra-articular     Plan:     The patient was counseled regarding a closed reduction and pinning right thumb proximal phalanx fracture.  I believe a longitudinal pin across the D IP joint and probably 2 additional pins crossing the fracture would be appropriate.  Risk complications and alternatives were discussed including the risk of infection, anesthetic risk, injury to nerves and vessels, loss of motion, and possible need for additional surgeries were discussed.  She seems to understand and agree to that surgery.  All questions were answered.  She will need pin removal in the office at 6 weeks post surgery.                Disclaimer: This note was prepared using a voice recognition system and is likely to have sound alike errors within the text.

## 2023-08-09 NOTE — PROGRESS NOTES
Subjective:     Patient ID: Jacqueline Luna is a 67 y.o. female.    Chief Complaint: Pain and Injury of the Right Hand      HPI:  The patient is a 67-year-old female who fell and injured her right thumb 08/05/2023.  She has an oblique fracture of the proximal phalanx right thumb, closed the and displaced that will need a closed reduction and pinning    Past Medical History:   Diagnosis Date    Anxiety disorder, unspecified     COPD (chronic obstructive pulmonary disease)     Depression     Family history of malignant neoplasm of breast 8/8/2023    Fibrocystic breast     HTN (hypertension)     Hypercholesteremia     Mass of multiple sites of left breast 08/07/2023    Mass of multiple sites of right breast 08/07/2023    Mastodynia 8/8/2023    Mild intermittent asthma, uncomplicated     Type 2 diabetes mellitus without complications      Past Surgical History:   Procedure Laterality Date    CORONARY ANGIOGRAPHY N/A 3/12/2021    Procedure: ANGIOGRAM, CORONARY ARTERY;  Surgeon: Shruti Harrison MD;  Location: [facility] CATH LAB;  Service: Cardiology;  Laterality: N/A;    CORONARY ANGIOPLASTY WITH STENT PLACEMENT N/A 3/12/2021    Procedure: Angioplasty, Coronary Artery, With Stent Insertion;  Surgeon: Shruti Harrison MD;  Location: [facility] CATH LAB;  Service: Cardiology;  Laterality: N/A;    LEFT HEART CATHETERIZATION Left 3/12/2021    Procedure: CATHETERIZATION, HEART, LEFT;  Surgeon: Shruti Harrison MD;  Location: [facility] CATH LAB;  Service: Cardiology;  Laterality: Left;     Family History   Problem Relation Age of Onset    Diabetes Mother     Hypertension Mother     Cancer Mother     Cancer Father     COPD Father     Breast cancer Sister         dx 40's    Breast cancer Sister 74    Breast cancer Maternal Aunt     Breast cancer Maternal Aunt     Ovarian cancer Neg Hx      Social History     Socioeconomic History    Marital status: Single   Tobacco Use    Smoking status: Former     Current packs/day: 0.00     Average  packs/day: 0.5 packs/day for 35.2 years (17.6 ttl pk-yrs)     Types: Cigarettes     Start date: 1986     Quit date: 3/5/2021     Years since quittin.4   Substance and Sexual Activity    Alcohol use: Not Currently    Drug use: Never    Sexual activity: Not Currently     Birth control/protection: Abstinence     Medication List with Changes/Refills   Current Medications    ALBUTEROL (PROVENTIL) 2.5 MG /3 ML (0.083 %) NEBULIZER SOLUTION    Take 3 mLs (2.5 mg total) by nebulization every 4 to 6 hours as needed for Wheezing or Shortness of Breath.    ALBUTEROL (PROVENTIL/VENTOLIN HFA) 90 MCG/ACTUATION INHALER    Inhale 2 puffs into the lungs every 4 (four) hours as needed for Wheezing or Shortness of Breath. Rescue    AMLODIPINE (NORVASC) 10 MG TABLET    Take 10 mg by mouth once daily.    ASPIRIN (ECOTRIN) 81 MG EC TABLET    Take 1 tablet (81 mg total) by mouth once daily.    ATORVASTATIN (LIPITOR) 80 MG TABLET    Take 1 tablet (80 mg total) by mouth every evening.    BLOOD SUGAR DIAGNOSTIC (TRUE METRIX GLUCOSE TEST STRIP) STRP    2 (two) times daily.    BLOOD-GLUCOSE METER KIT    Use as instructed    CLOPIDOGREL (PLAVIX) 75 MG TABLET    TAKE 1 TABLET EVERY DAY    EZETIMIBE (ZETIA) 10 MG TABLET    ezetimibe 10 mg tablet    FLUTICASONE-SALMETEROL DISKUS INHALER 250-50 MCG    Inhale 1 puff into the lungs 2 (two) times daily. Wash out mouth after use.    FUROSEMIDE (LASIX) 20 MG TABLET    Take 1 tablet (20 mg total) by mouth daily as needed (swelling).    IBUPROFEN (ADVIL,MOTRIN) 800 MG TABLET    ibuprofen 800 mg tablet    LANCETS (TRUEPLUS LANCETS) 33 GAUGE MISC    2 (two) times daily.    LISINOPRIL-HYDROCHLOROTHIAZIDE (PRINZIDE,ZESTORETIC) 20-12.5 MG PER TABLET    Take 1 tablet by mouth once daily.    METFORMIN (GLUCOPHAGE-XR) 500 MG ER 24HR TABLET    metformin  mg tablet,extended release 24 hr    METOPROLOL SUCCINATE (TOPROL-XL) 25 MG 24 HR TABLET    TAKE 1 TABLET EVERY DAY    SERTRALINE (ZOLOFT) 100 MG  TABLET    Take 100 mg by mouth once daily.    TRUE METRIX AIR GLUCOSE METER KIT        TRUE METRIX GLUCOSE TEST STRIP STRP    SMARTSIG:Via Meter    TRUEPLUS LANCETS 33 GAUGE MISC         Review of patient's allergies indicates:  No Known Allergies  Review of Systems   Constitutional: Negative for malaise/fatigue.   HENT:  Negative for hearing loss.    Eyes:  Negative for double vision and visual disturbance.   Cardiovascular:  Positive for chest pain.   Respiratory:  Negative for shortness of breath.    Endocrine: Negative for cold intolerance.   Hematologic/Lymphatic: Does not bruise/bleed easily.   Skin:  Negative for poor wound healing and suspicious lesions.   Musculoskeletal:  Negative for gout, joint pain and joint swelling.   Gastrointestinal:  Negative for nausea and vomiting.   Genitourinary:  Negative for dysuria.   Neurological:  Negative for numbness, paresthesias and sensory change.   Psychiatric/Behavioral:  Negative for depression, memory loss and substance abuse. The patient is not nervous/anxious.    Allergic/Immunologic: Negative for persistent infections.       Objective:   Body mass index is 35.33 kg/m².  There were no vitals filed for this visit.             General    Constitutional: She is oriented to person, place, and time. She appears well-developed and well-nourished. No distress.   HENT:   Head: Normocephalic.   Mouth/Throat: Oropharynx is clear and moist.   Eyes: EOM are normal.   Cardiovascular:  Normal rate.            Pulmonary/Chest: Effort normal.   Abdominal: Soft.   Neurological: She is alert and oriented to person, place, and time. No cranial nerve deficit.   Psychiatric: She has a normal mood and affect.             Right Hand/Wrist Exam     Inspection   Scars: Wrist - absent Hand -  absent  Effusion: Wrist - absent Hand -  present    Pain   Hand - The patient exhibits pain of the thumb MCP and thumb IP.    Other     Neuorologic Exam    Median Distribution: normal  Ulnar  Distribution: normal  Radial Distribution: normal    Comments:  The patient has a spiral fracture displaced right thumb proximal phalanx, closed          Vascular Exam       Capillary Refill  Right Hand: normal capillary refill          Relevant imaging results reviewed and interpreted by me, discussed with the patient and / or family today radiographs of the right thumb showed a spiral fracture proximal phalanx, extra-articular with displacement  Assessment:     Spiral fracture right thumb proximal phalanx, closed, displaced, extra-articular     Plan:     The patient was counseled regarding a closed reduction and pinning right thumb proximal phalanx fracture.  I believe a longitudinal pin across the D IP joint and probably 2 additional pins crossing the fracture would be appropriate.  Risk complications and alternatives were discussed including the risk of infection, anesthetic risk, injury to nerves and vessels, loss of motion, and possible need for additional surgeries were discussed.  She seems to understand and agree to that surgery.  All questions were answered.  She will need pin removal in the office at 6 weeks post surgery.                Disclaimer: This note was prepared using a voice recognition system and is likely to have sound alike errors within the text.

## 2023-08-09 NOTE — PROGRESS NOTES
Subjective:   Patient ID:  Jacqueline Luna is a 67 y.o. female who presents for evaluation of Pre-op Exam        HPI   8.9.2023  COMES IN FOR A 1 YEAR FOLLOW-UP.    Going for a hand surgery.    She is doing well denies any chest pain.  Her functional status is fair acceptable.  She does all her chores.    Denies any dyspnea lower extremity swelling palpitations syncope or presyncope.    She had her stent 2 years ago.  With nonobstructive disease on the left.      9.1.2022 this is a virtual visit.    Patient has multiple noncardiac complaints.  She complains of stiffness to her and and joints.    She denies any chest pain or dyspnea on exertion.    She complains of fatigue as well.    There is no lower extremity swelling.  Blood pressure under control.    Compliant with medications.    No bleeding.  She is still taking aspirin and Plavix since her stent.  Will continue to aspirin 81 mg and stop the Plavix  Her LINA was normal at rest but with exercise mildly decreased however ultrasound lower extremity was normal.  His CTA a runoff showed moderate disease in the 50% range iliac and SFA is.  A follow-up was made with the patient she denies any more symptoms to her left thigh.    2.8.2022   cw toprol plavix asa, lasix, statin   Has mild james did not follow with pulm   Did not get pft done, states has wheezing , pacheco   Left upper thigh claudication , not better when she leans on the cart   Also has back pain, going for xray lumbar spine   Also states was told her blood count was elevated and underwent evaluation at a cancer center but was told no cancer , KENNETH WHITESIDE NP   No chest pain       4.2021  66 yo female with pmhx below , not a smoker  S/p recent discharge two weeks ago she presented with an inferior stemi after 3 days of intermittent chest pains   No more chest pain since discharge, no groin issues   Reports Dyspnea at exertion which she relates to her copd , but states that sometimes she gets dyspnea at rest  and believes this is new compared to before , ? 2/2 brilinta    Few wheezing episodes, uses albuterol PRN    Reports left leg claudication from thigh to calf with minimal walking   Past Medical History:   Diagnosis Date    Anxiety disorder, unspecified     COPD (chronic obstructive pulmonary disease)     Depression     Family history of malignant neoplasm of breast 2023    Fibrocystic breast     HTN (hypertension)     Hypercholesteremia     Mass of multiple sites of left breast 2023    Mass of multiple sites of right breast 2023    Mastodynia 2023    Mild intermittent asthma, uncomplicated     Type 2 diabetes mellitus without complications        Past Surgical History:   Procedure Laterality Date    CORONARY ANGIOGRAPHY N/A 3/12/2021    Procedure: ANGIOGRAM, CORONARY ARTERY;  Surgeon: Shruti Harrison MD;  Location: [facility] CATH LAB;  Service: Cardiology;  Laterality: N/A;    CORONARY ANGIOPLASTY WITH STENT PLACEMENT N/A 3/12/2021    Procedure: Angioplasty, Coronary Artery, With Stent Insertion;  Surgeon: Shruti Harrison MD;  Location: [facility] CATH LAB;  Service: Cardiology;  Laterality: N/A;    LEFT HEART CATHETERIZATION Left 3/12/2021    Procedure: CATHETERIZATION, HEART, LEFT;  Surgeon: Shruti Harrison MD;  Location: [facility] CATH LAB;  Service: Cardiology;  Laterality: Left;       Social History     Tobacco Use    Smoking status: Former     Current packs/day: 0.00     Average packs/day: 0.5 packs/day for 35.2 years (17.6 ttl pk-yrs)     Types: Cigarettes     Start date: 1986     Quit date: 3/5/2021     Years since quittin.4   Substance Use Topics    Alcohol use: Not Currently    Drug use: Never       Family History   Problem Relation Age of Onset    Diabetes Mother     Hypertension Mother     Cancer Mother     Cancer Father     COPD Father     Breast cancer Sister         dx 40's    Breast cancer Sister 74    Breast cancer Maternal Aunt     Breast cancer Maternal Aunt     Ovarian cancer  Neg Hx        Review of Systems   Cardiovascular:  Negative for chest pain, dyspnea on exertion, palpitations and syncope.   Genitourinary: Negative.    Neurological: Negative.      Physical Exam  Vitals reviewed.   Constitutional:       Appearance: She is well-developed.   Neck:      Vascular: No carotid bruit.   Cardiovascular:      Rate and Rhythm: Normal rate and regular rhythm.      Pulses: Intact distal pulses.      Heart sounds: Normal heart sounds. No murmur heard.  Pulmonary:      Breath sounds: Normal breath sounds.   Neurological:      Mental Status: She is oriented to person, place, and time.         Current Outpatient Medications on File Prior to Visit   Medication Sig    albuterol (PROVENTIL) 2.5 mg /3 mL (0.083 %) nebulizer solution Take 3 mLs (2.5 mg total) by nebulization every 4 to 6 hours as needed for Wheezing or Shortness of Breath.    albuterol (PROVENTIL/VENTOLIN HFA) 90 mcg/actuation inhaler Inhale 2 puffs into the lungs every 4 (four) hours as needed for Wheezing or Shortness of Breath. Rescue    amLODIPine (NORVASC) 10 MG tablet Take 10 mg by mouth once daily.    atorvastatin (LIPITOR) 80 MG tablet Take 1 tablet (80 mg total) by mouth every evening.    blood sugar diagnostic (TRUE METRIX GLUCOSE TEST STRIP) Strp 2 (two) times daily.    blood-glucose meter kit Use as instructed    ezetimibe (ZETIA) 10 mg tablet ezetimibe 10 mg tablet    fluticasone-salmeterol diskus inhaler 250-50 mcg Inhale 1 puff into the lungs 2 (two) times daily. Wash out mouth after use.    ibuprofen (ADVIL,MOTRIN) 800 MG tablet ibuprofen 800 mg tablet    lancets (TRUEPLUS LANCETS) 33 gauge Misc 2 (two) times daily.    lisinopriL-hydrochlorothiazide (PRINZIDE,ZESTORETIC) 20-12.5 mg per tablet Take 1 tablet by mouth once daily.    metFORMIN (GLUCOPHAGE-XR) 500 MG ER 24hr tablet metformin  mg tablet,extended release 24 hr    metoprolol succinate (TOPROL-XL) 25 MG 24 hr tablet TAKE 1 TABLET EVERY DAY    sertraline  (ZOLOFT) 100 MG tablet Take 100 mg by mouth once daily.    TRUE METRIX AIR GLUCOSE METER kit     TRUE METRIX GLUCOSE TEST STRIP Strp SMARTSIG:Via Meter    TRUEPLUS LANCETS 33 gauge Misc     [DISCONTINUED] clopidogreL (PLAVIX) 75 mg tablet TAKE 1 TABLET EVERY DAY    aspirin (ECOTRIN) 81 MG EC tablet Take 1 tablet (81 mg total) by mouth once daily.    furosemide (LASIX) 20 MG tablet Take 1 tablet (20 mg total) by mouth daily as needed (swelling).     No current facility-administered medications on file prior to visit.       Objective:   Objective:  Wt Readings from Last 3 Encounters:   08/09/23 83.5 kg (184 lb 1.4 oz)   08/08/23 84.8 kg (187 lb)   08/05/23 84.4 kg (186 lb 1.1 oz)     Temp Readings from Last 3 Encounters:   08/05/23 97.9 °F (36.6 °C) (Oral)   01/17/23 98.3 °F (36.8 °C) (Oral)   11/17/22 97.3 °F (36.3 °C) (Oral)     BP Readings from Last 3 Encounters:   08/09/23 130/70   08/05/23 (!) 140/75   04/03/23 136/65     Pulse Readings from Last 3 Encounters:   08/09/23 73   08/05/23 84   04/03/23 79           Lab Results   Component Value Date    CHOL 130 05/25/2023    CHOL 193 11/28/2022    CHOL 177 09/01/2022     Lab Results   Component Value Date    HDL 37 (L) 05/25/2023    HDL 34 (L) 11/28/2022    HDL 40 09/01/2022     Lab Results   Component Value Date    LDLCALC 68 05/25/2023    LDLCALC 115 (H) 11/28/2022    LDLCALC 101 (H) 09/01/2022     Lab Results   Component Value Date    TRIG 146 05/25/2023    TRIG 249 (H) 11/28/2022    TRIG 210 (H) 09/01/2022     Lab Results   Component Value Date    CHOLHDL 21.7 03/13/2021       Chemistry        Component Value Date/Time     01/17/2023 1134    K 4.0 01/17/2023 1134     01/17/2023 1134    CO2 22 (L) 01/17/2023 1134    BUN 12 01/17/2023 1134    CREATININE 0.9 01/17/2023 1134    GLU 97 01/17/2023 1134        Component Value Date/Time    CALCIUM 9.6 01/17/2023 1134    ALKPHOS 145 (H) 01/17/2023 1134    AST 17 01/17/2023 1134    ALT 13 01/17/2023 1134     "BILITOT 0.4 01/17/2023 1134    ESTGFRAFRICA >60 03/01/2022 1550    EGFRNONAA >60 03/01/2022 1550          Lab Results   Component Value Date    TSH 2.530 02/16/2022     No results found for: "INR", "PROTIME"  Lab Results   Component Value Date    WBC 16.4 (H) 05/25/2023    HGB 12.3 05/25/2023    HCT 39.5 05/25/2023    MCV 80 (L) 01/17/2023     05/25/2023     BNP  @LABRCNTIP(BNP,BNPTRIAGEBLO)@  CrCl cannot be calculated (Patient's most recent lab result is older than the maximum 7 days allowed.).     Imaging:  ======  Results for orders placed during the hospital encounter of 03/12/21    Echo Color Flow Doppler? Yes    Interpretation Summary  · Concentric hypertrophy and normal systolic function. The estimated ejection fraction is 55%  · Mild tricuspid regurgitation.  · Normal left ventricular diastolic function.  · There are segmental left ventricular wall motion abnormalities.  · With normal right ventricular systolic function.  · Normal central venous pressure (3 mmHg).  · The estimated PA systolic pressure is 21 mmHg.    No results found for this or any previous visit.    No results found for this or any previous visit.    No results found for this or any previous visit.    No results found for this or any previous visit.    No valid procedures specified.    Diagnostic Results:  ECG: Reviewed    3/13/2021   Description of the findings of the procedure:   Left main patent   Lad patent, d1 patent   circ with 30% proximal , om1 intermediate   Ramus is ostially 40%   rca is 100% thrombotic, on top of a diffusely diseased vessel      Plan   S/p successful PCI, 3.5x28 mm JOSE DAVID , post dilated to 4.0 mid stent   brilinta received 180 mg   Asa   lipitor       The ASCVD Risk score (Mca DK, et al., 2019) failed to calculate for the following reasons:    The patient has a prior MI or stroke diagnosis    Assessment and Plan:   Essential hypertension    Mixed hyperlipidemia    Status post primary angioplasty with " coronary stent 2021 rca    Morbid obesity    Preop testing    Claudication  Comments:  Resolved.  Had normal LINA at rest with mild decrease with exercise.  And CTA with runoff showed disease in the 50%-range.  See CTA results          Statins , asa , Toprol   EKG unchanged from the past.  Old infarct.  History of PCI to her RCA NSTEMI.    Angina free.  Acceptable functional status.  Okay to undergo her hand surgery.  Stable from cardiac standpoint.    Okay to hold her aspirin for 5 days prior to her procedure.  DC Plavix  She had mild RONALDO on sleep study but states that she was claustrophobic with the CPAP and made her symptoms worse, follows with Pulmonary.    Reviewed all tests and above medical conditions with patient in detail and formulated treatment plan.  Risk factor modification discussed.   Cardiac low salt diet discussed.  Maintaining healthy weight and weight loss goals were discussed in clinic.        Follow up in 6 months

## 2023-08-10 ENCOUNTER — TELEPHONE (OUTPATIENT)
Dept: PREADMISSION TESTING | Facility: HOSPITAL | Age: 68
End: 2023-08-10
Payer: MEDICARE

## 2023-08-10 ENCOUNTER — ANESTHESIA EVENT (OUTPATIENT)
Dept: SURGERY | Facility: HOSPITAL | Age: 68
End: 2023-08-10
Payer: MEDICARE

## 2023-08-10 RX ORDER — CLOPIDOGREL BISULFATE 75 MG/1
75 TABLET ORAL DAILY
COMMUNITY
End: 2024-01-29

## 2023-08-10 NOTE — TELEPHONE ENCOUNTER
Pre op instructions reviewed with Pt,verbalized understanding.    To confirm, Surgery is scheduled on 8/11/23.   Please arrive at 11:00 am.   *Please report to the [facility] (1st Floor) located off of [facility] (2nd Entrance/Building on the left, in front of the flag pole).  Address: [address] [place] [address] [place]. [id number]          INSTRUCTIONS IMPORTANT!!!  Do Not Eat, Drink, or Smoke after 12 midnight unless instructed otherwise by your Surgeon. OK to brush teeth, no gum, candy or mints!      *Take Only these medications with a small sip of water Morning of Surgery:  -Amlodipine  -Metoprolol  -Sertraline  -Atorvastatin      ____  HOLD all vitamins, herbal supplements, aspirin products & NSAIDS 7 days prior to surgery, as these can thin the blood.  ____  Avoid Alcoholic beverages 3 days prior to surgery, as it can thin the blood.  ____  NO Acrylic/fake nails or nail polish worn day of surgery (specifically hand/arm & foot surgeries).  ____  NO powder, lotions, deodorants, oils or cream on body.  ____  Remove all jewelry & piercings & foreign objects before arrival & leave at home.  ____  Remove Dentures, Hearing Aids & Contact Lens prior to surgery.  ____  Bring photo ID and insurance information to hospital (Leave Valuables at Home).  ____  If going home the same day, arrange for a ride home. You will not be able to drive for 24 hrs if Anesthesia was used.   ____  Females (ages 11-60): may need to give a urine sample the morning of surgery; please see Pre op Nurse prior to using the restroom.  ____  Males: Stop ED medications (Viagra, Cialis) 24 hrs prior to surgery.  ____  Wear clean, loose fitting clothing to allow for dressings/ bandages.            Diabetic Patients: If you take diabetic medication, do NOT take morning of surgery unless instructed by Doctor. Metformin to be stopped 24 hrs prior to surgery time. DO NOT take long-acting insulin the evening before surgery. Blood sugars will  be checked in pre-op by Nurse.    Bathing Instructions:    -Shower with anti-bacterial Soap (Hibiclens or Dial) the night before surgery and the morning of.   -Do not use Hibiclens on your face or genitals.   -Apply clean clothes after shower.  -Do not shave your face or body 2 days prior to surgery unless instructed otherwise by your Surgeon.  -Do not shave pubic hair 7 days prior to surgery (gyn pt's).    [facility] Visitor/Ride Policy:  Only 2 adults allowed in pre op/recovery area during your procedure. You MUST HAVE A RIDE HOME from a responsible adult that you know and trust. Medical Transport, Uber or Lyft can ONLY be used if patient has a responsible adult to accompany them during ride home.    Discharge Instructions: You will receive Post-op/Discharge instructions by your Discharge Nurse prior to going home.   *Prevention of surgical site infections:   -Keep incisions clean and dry.   -Do not soak/submerge incisions in water until completely healed.   -Do not apply lotions, powders, creams, or deodorants to site.   -Always make sure hands are cleaned with antibacterial soap/ alcohol-based  prior to touching the surgical site.        *Signs and symptoms:               -Redness and pain around the area where you had surgery               -Drainage of cloudy fluid from your surgical wound               -Fever over 100.4 or chills      >>>Call Surgeon office/on-call Surgeon if you experience any of these signs & symptoms post-surgery @ [phone].       *If you are running late or have questions the morning of surgery, please call the Hospital Surgery Dept @ [phone].     *Billing questions:  [phone]       Thank you,  -[facility] Pre Admit Dept.  [phone] or [phone]  M-F 7:30 am-4:00 pm (Closed Major Holidays)

## 2023-08-10 NOTE — TELEPHONE ENCOUNTER
Notified Dr. Mcclure's Nurse, Abril, Pt's Last dose of Aspirin and Plavix-8/09/23.  Nurse Abril's response: Dr. Mcclure is aware, okay to proceed.  Surgery scheduled for 8/11/23.

## 2023-08-11 ENCOUNTER — ANESTHESIA (OUTPATIENT)
Dept: SURGERY | Facility: HOSPITAL | Age: 68
End: 2023-08-11
Payer: MEDICARE

## 2023-08-11 ENCOUNTER — HOSPITAL ENCOUNTER (OUTPATIENT)
Facility: HOSPITAL | Age: 68
Discharge: HOME | End: 2023-08-11
Attending: ORTHOPAEDIC SURGERY | Admitting: ORTHOPAEDIC SURGERY
Payer: MEDICARE

## 2023-08-11 DIAGNOSIS — S62.501A CLOSED NONDISPLACED FRACTURE OF PHALANX OF RIGHT THUMB, UNSPECIFIED PHALANX, INITIAL ENCOUNTER: Primary | ICD-10-CM

## 2023-08-11 LAB — POCT GLUCOSE: 83 MG/DL (ref 70–110)

## 2023-08-11 PROCEDURE — 37000008 HC ANESTHESIA 1ST 15 MINUTES: Performed by: ORTHOPAEDIC SURGERY

## 2023-08-11 PROCEDURE — 25000003 PHARM REV CODE 250

## 2023-08-11 PROCEDURE — 63600175 PHARM REV CODE 636 W HCPCS: Performed by: NURSE ANESTHETIST, CERTIFIED REGISTERED

## 2023-08-11 PROCEDURE — 37000009 HC ANESTHESIA EA ADD 15 MINS: Performed by: ORTHOPAEDIC SURGERY

## 2023-08-11 PROCEDURE — 25000003 PHARM REV CODE 250: Performed by: NURSE ANESTHETIST, CERTIFIED REGISTERED

## 2023-08-11 PROCEDURE — 36000707: Performed by: ORTHOPAEDIC SURGERY

## 2023-08-11 PROCEDURE — 63600175 PHARM REV CODE 636 W HCPCS

## 2023-08-11 PROCEDURE — 63600175 PHARM REV CODE 636 W HCPCS: Performed by: ORTHOPAEDIC SURGERY

## 2023-08-11 PROCEDURE — 26727 TREAT FINGER FRACTURE EACH: CPT | Mod: F5,,, | Performed by: ORTHOPAEDIC SURGERY

## 2023-08-11 PROCEDURE — 25000003 PHARM REV CODE 250: Performed by: ORTHOPAEDIC SURGERY

## 2023-08-11 PROCEDURE — 71000033 HC RECOVERY, INTIAL HOUR: Performed by: ORTHOPAEDIC SURGERY

## 2023-08-11 PROCEDURE — C1769 GUIDE WIRE: HCPCS | Performed by: ORTHOPAEDIC SURGERY

## 2023-08-11 PROCEDURE — 36000706: Performed by: ORTHOPAEDIC SURGERY

## 2023-08-11 PROCEDURE — 71000015 HC POSTOP RECOV 1ST HR: Performed by: ORTHOPAEDIC SURGERY

## 2023-08-11 PROCEDURE — 25000003 PHARM REV CODE 250: Performed by: ANESTHESIOLOGY

## 2023-08-11 PROCEDURE — 26727 PR PERCUT RX PROX/MID FING SHFT FX: ICD-10-PCS | Mod: F5,,, | Performed by: ORTHOPAEDIC SURGERY

## 2023-08-11 PROCEDURE — 63600175 PHARM REV CODE 636 W HCPCS: Performed by: ANESTHESIOLOGY

## 2023-08-11 DEVICE — WIRE C TROCAR TIP .045: Type: IMPLANTABLE DEVICE | Site: HAND | Status: FUNCTIONAL

## 2023-08-11 RX ORDER — MIDAZOLAM HYDROCHLORIDE 1 MG/ML
INJECTION, SOLUTION INTRAMUSCULAR; INTRAVENOUS
Status: DISCONTINUED | OUTPATIENT
Start: 2023-08-11 | End: 2023-08-11

## 2023-08-11 RX ORDER — ONDANSETRON 2 MG/ML
4 INJECTION INTRAMUSCULAR; INTRAVENOUS DAILY PRN
Status: DISCONTINUED | OUTPATIENT
Start: 2023-08-11 | End: 2023-08-11 | Stop reason: HOSPADM

## 2023-08-11 RX ORDER — SODIUM CHLORIDE 0.9 % (FLUSH) 0.9 %
3 SYRINGE (ML) INJECTION
Status: DISCONTINUED | OUTPATIENT
Start: 2023-08-11 | End: 2023-08-11 | Stop reason: HOSPADM

## 2023-08-11 RX ORDER — BUPIVACAINE HYDROCHLORIDE 2.5 MG/ML
INJECTION, SOLUTION EPIDURAL; INFILTRATION; INTRACAUDAL
Status: DISCONTINUED | OUTPATIENT
Start: 2023-08-11 | End: 2023-08-11 | Stop reason: HOSPADM

## 2023-08-11 RX ORDER — IBUPROFEN 600 MG/1
600 TABLET ORAL EVERY 6 HOURS PRN
Qty: 30 TABLET | Refills: 0 | Status: SHIPPED | OUTPATIENT
Start: 2023-08-11 | End: 2023-09-10

## 2023-08-11 RX ORDER — FENTANYL CITRATE 50 UG/ML
INJECTION, SOLUTION INTRAMUSCULAR; INTRAVENOUS
Status: DISCONTINUED | OUTPATIENT
Start: 2023-08-11 | End: 2023-08-11

## 2023-08-11 RX ORDER — KETOROLAC TROMETHAMINE 30 MG/ML
15 INJECTION, SOLUTION INTRAMUSCULAR; INTRAVENOUS EVERY 8 HOURS PRN
Status: DISCONTINUED | OUTPATIENT
Start: 2023-08-11 | End: 2023-08-11 | Stop reason: HOSPADM

## 2023-08-11 RX ORDER — SODIUM CHLORIDE 0.9 % (FLUSH) 0.9 %
10 SYRINGE (ML) INJECTION
Status: DISCONTINUED | OUTPATIENT
Start: 2023-08-11 | End: 2023-08-11 | Stop reason: HOSPADM

## 2023-08-11 RX ORDER — DIPHENHYDRAMINE HYDROCHLORIDE 50 MG/ML
25 INJECTION INTRAMUSCULAR; INTRAVENOUS EVERY 6 HOURS PRN
Status: DISCONTINUED | OUTPATIENT
Start: 2023-08-11 | End: 2023-08-11 | Stop reason: HOSPADM

## 2023-08-11 RX ORDER — HYDROMORPHONE HYDROCHLORIDE 2 MG/ML
0.2 INJECTION, SOLUTION INTRAMUSCULAR; INTRAVENOUS; SUBCUTANEOUS EVERY 5 MIN PRN
Status: DISCONTINUED | OUTPATIENT
Start: 2023-08-11 | End: 2023-08-11 | Stop reason: HOSPADM

## 2023-08-11 RX ORDER — HYDROCODONE BITARTRATE AND ACETAMINOPHEN 5; 325 MG/1; MG/1
1 TABLET ORAL EVERY 4 HOURS PRN
Status: CANCELLED | OUTPATIENT
Start: 2023-08-11

## 2023-08-11 RX ORDER — METOPROLOL SUCCINATE 25 MG/1
25 TABLET, EXTENDED RELEASE ORAL ONCE
Status: COMPLETED | OUTPATIENT
Start: 2023-08-11 | End: 2023-08-11

## 2023-08-11 RX ORDER — OXYCODONE HYDROCHLORIDE 5 MG/1
5 TABLET ORAL
Status: DISCONTINUED | OUTPATIENT
Start: 2023-08-11 | End: 2023-08-11 | Stop reason: HOSPADM

## 2023-08-11 RX ORDER — CHLORHEXIDINE GLUCONATE ORAL RINSE 1.2 MG/ML
10 SOLUTION DENTAL 2 TIMES DAILY
Status: CANCELLED | OUTPATIENT
Start: 2023-08-11 | End: 2023-08-16

## 2023-08-11 RX ORDER — CEFAZOLIN SODIUM 2 G/50ML
2 SOLUTION INTRAVENOUS
Status: COMPLETED | OUTPATIENT
Start: 2023-08-11 | End: 2023-08-11

## 2023-08-11 RX ORDER — ALBUTEROL SULFATE 0.83 MG/ML
2.5 SOLUTION RESPIRATORY (INHALATION) EVERY 4 HOURS PRN
Status: DISCONTINUED | OUTPATIENT
Start: 2023-08-11 | End: 2023-08-11 | Stop reason: HOSPADM

## 2023-08-11 RX ORDER — CHLORHEXIDINE GLUCONATE ORAL RINSE 1.2 MG/ML
10 SOLUTION DENTAL
Status: DISPENSED | OUTPATIENT
Start: 2023-08-11

## 2023-08-11 RX ORDER — PROCHLORPERAZINE EDISYLATE 5 MG/ML
5 INJECTION INTRAMUSCULAR; INTRAVENOUS EVERY 30 MIN PRN
Status: DISCONTINUED | OUTPATIENT
Start: 2023-08-11 | End: 2023-08-11 | Stop reason: HOSPADM

## 2023-08-11 RX ORDER — LIDOCAINE HYDROCHLORIDE 10 MG/ML
INJECTION, SOLUTION EPIDURAL; INFILTRATION; INTRACAUDAL; PERINEURAL
Status: DISCONTINUED | OUTPATIENT
Start: 2023-08-11 | End: 2023-08-11

## 2023-08-11 RX ORDER — METOPROLOL SUCCINATE 25 MG/1
25 TABLET, EXTENDED RELEASE ORAL ONCE
Status: DISCONTINUED | OUTPATIENT
Start: 2023-08-11 | End: 2023-08-11 | Stop reason: HOSPADM

## 2023-08-11 RX ORDER — MEPERIDINE HYDROCHLORIDE 25 MG/ML
12.5 INJECTION INTRAMUSCULAR; INTRAVENOUS; SUBCUTANEOUS ONCE
Status: DISCONTINUED | OUTPATIENT
Start: 2023-08-11 | End: 2023-08-11 | Stop reason: HOSPADM

## 2023-08-11 RX ORDER — PROPOFOL 10 MG/ML
VIAL (ML) INTRAVENOUS
Status: DISCONTINUED | OUTPATIENT
Start: 2023-08-11 | End: 2023-08-11

## 2023-08-11 RX ADMIN — METOPROLOL SUCCINATE 25 MG: 25 TABLET, FILM COATED, EXTENDED RELEASE ORAL at 11:08

## 2023-08-11 RX ADMIN — MIDAZOLAM 2 MG: 1 INJECTION INTRAMUSCULAR; INTRAVENOUS at 11:08

## 2023-08-11 RX ADMIN — CHLORHEXIDINE GLUCONATE 0.12% ORAL RINSE 10 ML: 1.2 LIQUID ORAL at 11:08

## 2023-08-11 RX ADMIN — OXYCODONE HYDROCHLORIDE 5 MG: 5 TABLET ORAL at 12:08

## 2023-08-11 RX ADMIN — HYDROMORPHONE HYDROCHLORIDE 0.2 MG: 2 INJECTION INTRAMUSCULAR; INTRAVENOUS; SUBCUTANEOUS at 12:08

## 2023-08-11 RX ADMIN — PROPOFOL 150 MG: 10 INJECTION, EMULSION INTRAVENOUS at 11:08

## 2023-08-11 RX ADMIN — SODIUM CHLORIDE, SODIUM LACTATE, POTASSIUM CHLORIDE, AND CALCIUM CHLORIDE: .6; .31; .03; .02 INJECTION, SOLUTION INTRAVENOUS at 11:08

## 2023-08-11 RX ADMIN — FENTANYL CITRATE 100 MCG: 50 INJECTION, SOLUTION INTRAMUSCULAR; INTRAVENOUS at 11:08

## 2023-08-11 RX ADMIN — LIDOCAINE HYDROCHLORIDE 50 MG: 10 SOLUTION INTRAVENOUS at 11:08

## 2023-08-11 RX ADMIN — CEFAZOLIN SODIUM 2 G: 2 SOLUTION INTRAVENOUS at 11:08

## 2023-08-11 RX ADMIN — PROPOFOL 50 MG: 10 INJECTION, EMULSION INTRAVENOUS at 11:08

## 2023-08-11 NOTE — ANESTHESIA POSTPROCEDURE EVALUATION
Anesthesia Post Evaluation    Patient: Jacqueline Luna    Procedure(s) Performed: Procedure(s) (LRB):  CLOSED REDUCTION (Right)  PINNING, FRACTURE, PERCUTANEOUS (Right)    Final Anesthesia Type: general      Patient location during evaluation: PACU  Patient participation: Yes- Able to Participate  Level of consciousness: awake  Post-procedure vital signs: reviewed and stable  Pain management: adequate  Airway patency: patent    PONV status at discharge: No PONV  Anesthetic complications: no      Cardiovascular status: hemodynamically stable  Respiratory status: unassisted  Hydration status: euvolemic  Follow-up not needed.          Vitals Value Taken Time   /89 08/11/23 1305   Temp 36.3 °C (97.3 °F) 08/11/23 1225   Pulse 56 08/11/23 1305   Resp 20 08/11/23 1305   SpO2 100 % 08/11/23 1305         Event Time   Out of Recovery 13:03:13         Pain/Hoda Score: Pain Rating Prior to Med Admin: 7 (8/11/2023 12:55 PM)  Hoda Score: 10 (8/11/2023  1:15 PM)

## 2023-08-11 NOTE — ANESTHESIA PROCEDURE NOTES
Intubation    Date/Time: 8/11/2023 11:49 AM    Performed by: Venus Archuleta CRNA  Authorized by: Medardo Gomez MD    Intubation:     Induction:  Intravenous    Intubated:  Postinduction    Mask Ventilation:  Not attempted    Attempts:  1    Attempted By:  CRNA    Difficult Airway Encountered?: No      Complications:  None    Airway Device:  Supraglottic airway/LMA    Airway Device Size:  3.0    Style/Cuff Inflation:  Cuffed (inflated to minimal occlusive pressure)    Placement Verified By:  Capnometry    Complicating Factors:  None    Findings Post-Intubation:  Atraumatic/condition of teeth unchanged

## 2023-08-11 NOTE — OP NOTE
[facility] - Surgery ([facility])  General Surgery  Operative Note    SUMMARY     Date of Procedure: 8/11/2023     Procedure: Procedure(s) (LRB):  CLOSED REDUCTION (Right)  PINNING, FRACTURE, PERCUTANEOUS (Right)   thumb proximal phalanx fracture    Surgeon(s) and Role:     * Nawaf Mcclure MD - Primary    Assisting Surgeon: None    Pre-Operative Diagnosis:  Right thumb proximal phalanx extra-articular displaced fracture    Post-Operative Diagnosis:  Right thumb proximal phalanx extra-articular displaced fracture    Anesthesia:  General    Description:  The patient was taken to the operating room where general anesthesia was administered.  Satisfactory anesthesia had been achieved the right hand was prepped and draped usual sterile fashion.  No tourniquet was used.  She did receive 2 g of Ancef intravenously preoperatively.  At this time a closed reduction was performed.  A longitudinal 0.045 in K-wire was passed across the fracture longitudinally from distal to proximal yielding good position of the fracture and of the pin.  Two additional cross pins transversely 0.045 in were used for additional fixation.  Anatomic reduction and stable surgical construct was verified on the AP and lateral projections under image intensification.  The pins were cut off beneath the skin.  Xeroform gauze 4x4s and a well-padded thumb spica splint was applied.  The patient tolerated the procedure well and was transferred recovery room in satisfactory condition.    Significant Surgical Tasks Conducted by the Assistant(s), if Applicable:  No assistant    Complications:  None     Estimated Blood Loss (EBL):  1 mL           Implants:  30.045 in K-wires    Specimens: None           Condition: Good    Disposition: PACU - hemodynamically stable.    Attestation: I was present and scrubbed for the entire procedure.

## 2023-08-11 NOTE — PLAN OF CARE
POC and discharge instructions discussed with patient and family/friend; both verbalizes understanding

## 2023-08-11 NOTE — DISCHARGE SUMMARY
[facility] - Surgery (Hospital)  Discharge Note  Short Stay    Procedure(s) (LRB):  CLOSED REDUCTION (Right)  PINNING, FRACTURE, PERCUTANEOUS (Right) thumb proximal phalanx fracture      OUTCOME: Patient tolerated treatment/procedure well without complication and is now ready for discharge.    DISPOSITION: Home or Self Care    FINAL DIAGNOSIS:  Right thumb closed displaced proximal phalanx extra-articular fracture    FOLLOWUP: In clinic    DISCHARGE INSTRUCTIONS:    Discharge Procedure Orders   Diet general     Call MD for:  temperature >100.4     Call MD for:  persistent nausea and vomiting     Call MD for:  severe uncontrolled pain     Call MD for:  difficulty breathing, headache or visual disturbances     Call MD for:  redness, tenderness, or signs of infection (pain, swelling, redness, odor or green/yellow discharge around incision site)     Call MD for:  hives     Call MD for:  persistent dizziness or light-headedness     Call MD for:  extreme fatigue        TIME SPENT ON DISCHARGE:  20 minutes

## 2023-08-11 NOTE — ANESTHESIA PREPROCEDURE EVALUATION
08/11/2023  Jacqueline Luna is a 67 y.o., female.    Patient Active Problem List   Diagnosis    Acute ST elevation myocardial infarction (STEMI)    Essential hypertension    Mixed hyperlipidemia    Leukocytosis    Mass of multiple sites of right breast    Mass of multiple sites of left breast    Family history of malignant neoplasm of breast    Mastodynia     Pre-op Assessment    I have reviewed the Patient Summary Reports.     I have reviewed the Nursing Notes. I have reviewed the NPO Status.   I have reviewed the Medications.     Review of Systems  Anesthesia Hx:  Denies Family Hx of Anesthesia complications.   Denies Personal Hx of Anesthesia complications.   Hematology/Oncology:  Hematology Normal   Oncology Normal     EENT/Dental:EENT/Dental Normal   Cardiovascular:   Hypertension Past MI CABG/stent (Stent)  ECG has been reviewed. 2021 PCI with Stent for single vessel RCA ds and STEMI    Cleared by cardiologist with instruction to hold ASA    Pulmonary:   COPD Asthma    Renal/:  Renal/ Normal     Hepatic/GI:  Hepatic/GI Normal    Neurological:  Neurology Normal    Endocrine:   Diabetes  Obesity / BMI > 30  Dermatological:  Skin Normal    Psych:   anxiety depression          Physical Exam  General: Alert and Oriented    Airway:  Mallampati: II   Mouth Opening: Normal  TM Distance: Normal  Tongue: Normal  Neck ROM: Normal ROM        Anesthesia Plan  Type of Anesthesia, risks & benefits discussed:    Anesthesia Type: Gen ETT, Gen Supraglottic Airway, MAC  Intra-op Monitoring Plan: Standard ASA Monitors  Informed Consent: Informed consent signed with the Patient and all parties understand the risks and agree with anesthesia plan.  All questions answered.   ASA Score: 3  Day of Surgery Review of History & Physical: I have interviewed and examined the patient. I have reviewed the patient's H&P  dated:     Ready For Surgery From Anesthesia Perspective.     .  2021 ECHO   Concentric hypertrophy and normal systolic function. The estimated ejection fraction is 55%   Mild tricuspid regurgitation.   Normal left ventricular diastolic function.   There are segmental left ventricular wall motion abnormalities.   With normal right ventricular systolic function.   Normal central venous pressure (3 mmHg).   The estimated PA systolic pressure is 21 mmHg.

## 2023-08-11 NOTE — TRANSFER OF CARE
"Anesthesia Transfer of Care Note    Patient: Jacqueline Luna    Procedure(s) Performed: Procedure(s) (LRB):  CLOSED REDUCTION (Right)  PINNING, FRACTURE, PERCUTANEOUS (Right)    Patient location: PACU    Anesthesia Type: general    Transport from OR: Transported from OR on room air with adequate spontaneous ventilation    Post pain: adequate analgesia    Post assessment: no apparent anesthetic complications    Post vital signs: stable    Level of consciousness: sedated    Nausea/Vomiting: no nausea/vomiting    Complications: none    Transfer of care protocol was followed      Last vitals:   Visit Vitals  BP (!) 203/88   Pulse 61   Temp 36.5 °C (97.7 °F) (Temporal)   Resp 16   Ht 5' 1" (1.549 m)   Wt 83.8 kg (184 lb 11.9 oz)   SpO2 99%   Breastfeeding No   BMI 34.91 kg/m²     "

## 2023-08-14 VITALS
WEIGHT: 184.75 LBS | TEMPERATURE: 97 F | DIASTOLIC BLOOD PRESSURE: 89 MMHG | RESPIRATION RATE: 20 BRPM | HEIGHT: 61 IN | SYSTOLIC BLOOD PRESSURE: 179 MMHG | OXYGEN SATURATION: 100 % | BODY MASS INDEX: 34.88 KG/M2 | HEART RATE: 56 BPM

## 2023-08-15 NOTE — PROGRESS NOTES
SUBJECTIVE:      Patient ID: Jacqueline Luna is a 67 y.o. female.    HPI: Ms. Luna is here today for post-operative visit #1.  She is 5 days status post CLOSED REDUCTION (Right)  PINNING, FRACTURE, PERCUTANEOUS (Right) thumb proximal phalanx fracture by Dr. Mcclure on 8/11/23. She reports that she is having significant pain. Pain is 10/10. She is taking ibuprofen 600mg for pain and requests a stronger prescription.  She has been compliant with postop instructions and keeping the extremity dry. She denies fever, chills, and sweats since the time of the surgery.     Past Medical History:   Diagnosis Date    Anxiety disorder, unspecified     COPD (chronic obstructive pulmonary disease)     Depression     Family history of malignant neoplasm of breast 8/8/2023    Fibrocystic breast     HTN (hypertension)     Hypercholesteremia     Mass of multiple sites of left breast 08/07/2023    Mass of multiple sites of right breast 08/07/2023    Mastodynia 8/8/2023    Mild intermittent asthma, uncomplicated     Type 2 diabetes mellitus without complications      Past Surgical History:   Procedure Laterality Date    CLOSED REDUCTION, FINGER, WITH PINNING Right 8/11/2023    Procedure: CLOSED REDUCTION, FINGER, WITH PINNING;  Surgeon: Nawaf Mcclure MD;  Location: [facility] OR;  Service: Orthopedics;  Laterality: Right;  closed reduction and pinning right thumb proximal phalanx fracture    CORONARY ANGIOGRAPHY N/A 3/12/2021    Procedure: ANGIOGRAM, CORONARY ARTERY;  Surgeon: Shruti Harrison MD;  Location: [facility] CATH LAB;  Service: Cardiology;  Laterality: N/A;    CORONARY ANGIOPLASTY WITH STENT PLACEMENT N/A 3/12/2021    Procedure: Angioplasty, Coronary Artery, With Stent Insertion;  Surgeon: Shruti Harrison MD;  Location: [facility] CATH LAB;  Service: Cardiology;  Laterality: N/A;    LEFT HEART CATHETERIZATION Left 3/12/2021    Procedure: CATHETERIZATION, HEART, LEFT;  Surgeon: Shruti Harrison MD;  Location: [facility] CATH LAB;   Service: Cardiology;  Laterality: Left;     Family History   Problem Relation Age of Onset    Diabetes Mother     Hypertension Mother     Cancer Mother     Cancer Father     COPD Father     Breast cancer Sister         dx 40's    Breast cancer Sister 74    Breast cancer Maternal Aunt     Breast cancer Maternal Aunt     Ovarian cancer Neg Hx      Social History     Socioeconomic History    Marital status: Single   Tobacco Use    Smoking status: Former     Current packs/day: 0.00     Average packs/day: 0.5 packs/day for 35.2 years (17.6 ttl pk-yrs)     Types: Cigarettes     Start date: 1986     Quit date: 3/5/2021     Years since quittin.4   Substance and Sexual Activity    Alcohol use: Not Currently    Drug use: Never    Sexual activity: Not Currently     Birth control/protection: Abstinence     Medication List with Changes/Refills   New Medications    HYDROCODONE-ACETAMINOPHEN (NORCO) 5-325 MG PER TABLET    Take 1 tablet by mouth every 6 (six) hours as needed for Pain.   Current Medications    ALBUTEROL (PROVENTIL) 2.5 MG /3 ML (0.083 %) NEBULIZER SOLUTION    Take 3 mLs (2.5 mg total) by nebulization every 4 to 6 hours as needed for Wheezing or Shortness of Breath.    ALBUTEROL (PROVENTIL/VENTOLIN HFA) 90 MCG/ACTUATION INHALER    Inhale 2 puffs into the lungs every 4 (four) hours as needed for Wheezing or Shortness of Breath. Rescue    AMLODIPINE (NORVASC) 10 MG TABLET    Take 10 mg by mouth once daily.    ASPIRIN (ECOTRIN) 81 MG EC TABLET    Take 1 tablet (81 mg total) by mouth once daily.    ATORVASTATIN (LIPITOR) 80 MG TABLET    Take 1 tablet (80 mg total) by mouth every evening.    BLOOD SUGAR DIAGNOSTIC (TRUE METRIX GLUCOSE TEST STRIP) STRP    2 (two) times daily.    BLOOD-GLUCOSE METER KIT    Use as instructed    CLOPIDOGREL (PLAVIX) 75 MG TABLET    Take 75 mg by mouth once daily.    EZETIMIBE (ZETIA) 10 MG TABLET    Take 10 mg by mouth once daily.    FLUTICASONE-SALMETEROL DISKUS INHALER 250-50 MCG     "Inhale 1 puff into the lungs 2 (two) times daily. Wash out mouth after use.    FUROSEMIDE (LASIX) 20 MG TABLET    Take 1 tablet (20 mg total) by mouth daily as needed (swelling).    IBUPROFEN (ADVIL,MOTRIN) 600 MG TABLET    Take 1 tablet (600 mg total) by mouth every 6 (six) hours as needed for Pain.    IBUPROFEN (ADVIL,MOTRIN) 800 MG TABLET    Take 800 mg by mouth every 4 (four) hours as needed.    LANCETS (TRUEPLUS LANCETS) 33 GAUGE MISC    2 (two) times daily.    LISINOPRIL-HYDROCHLOROTHIAZIDE (PRINZIDE,ZESTORETIC) 20-12.5 MG PER TABLET    Take 1 tablet by mouth 2 (two) times a day.    METFORMIN (GLUCOPHAGE-XR) 500 MG ER 24HR TABLET    metformin  mg tablet,extended release 24 hr    METOPROLOL SUCCINATE (TOPROL-XL) 25 MG 24 HR TABLET    TAKE 1 TABLET EVERY DAY    SERTRALINE (ZOLOFT) 100 MG TABLET    Take 100 mg by mouth once daily.    TRUE METRIX AIR GLUCOSE METER KIT        TRUE METRIX GLUCOSE TEST STRIP STRP    SMARTSIG:Via Meter    TRUEPLUS LANCETS 33 GAUGE MISC         Review of patient's allergies indicates:  No Known Allergies    OBJECTIVE:     Physical exam:    Vitals:    08/16/23 0838   Weight: 83.5 kg (184 lb)   Height: 5' 1" (1.549 m)   PainSc: 10-Worst pain ever     Vital signs are stable, patient is afebrile.  Patient is well dressed and well groomed, no acute distress.  Alert and oriented to person, place, and time.    Right UE:  Post op dressing taken down.   Pin sites are clean, dry, and intact. Moderate tenderness to palpation of thumb  There is no erythema or exudate. There is no sign of any infection.   Mild swelling to thumb  She is NVI.   2+ pulses noted.  Cap refill <2 seconds  Motor intact to hand    ASSESSMENT         Encounter Diagnosis   Name Primary?    H/O reduction of closed fracture Yes            5 days status post CLOSED REDUCTION (Right)  PINNING, FRACTURE, PERCUTANEOUS (Right) thumb proximal phalanx fracture    PLAN:           Jacqueline was seen today for pain and post-op " evaluation.    Diagnoses and all orders for this visit:    H/O reduction of closed fracture  -     HYDROcodone-acetaminophen (NORCO) 5-325 mg per tablet; Take 1 tablet by mouth every 6 (six) hours as needed for Pain.      - PO instruction reviewed and provided to patient  - The pin sites were cleaned with hydrogen peroxide and normal saline. A sterile Band-Aid was applied.   - Patient may clean the incision daily as above.   - Clamshell splint applied to thumb today, coban provided  - intra-op xrays reviewed  - Norco 5mg #15 sent to pharmacy for pain. Discussed taking at nighttime and taking tylenol and ibuprofen during the day  - xray at next visit  - discussed that the pins may back out before her follow up. If this happens, apply neosporin and cover with band-aid and contact the office  - Patient should notify the office of any signs or symptoms of infection including fevers, erythema, purulent drainage, increasing pain.    - Follow up for pin removal 6 weeks post op    POST OPERATIVE INSTRUCTIONS - Visit #1    BANDAGES/DRESSING/BATHING:  We have removed the dressing, cleaned your incision, and put on a band-aid at your first post op visit today. You may clean the incision daily as we did today. Keep the incision clean and dry. If you are concerned about any redness or drainage of the incisions, please call the office.    SWELLING  Some swelling of your arm, hand and fingers is normal. The swelling can be decreased by  elevating your arm on a few pillows when lying down. Swelling can be further controlled by cold therapy over the surgical site. Flexion/extension of the fingers (opening and closing your hands) will also help to relieve swelling and prevent stiffness.    ACTIVITY  You may use the hand and wrist as tolerated for light activity. You are encouraged to bend the wrist, elbow and fingers (not the thumb) as soon as the initial surgical dressing is removed. Avoid lifting, pushing, or pulling any object  greater than 5-10 pounds for the first 10-14 days.     MEDICATIONS  Take pain medicines exactly as directed.  If the doctor gave you a prescription medicine for pain, take it as prescribed.  If you are not taking a prescription pain medicine, take an over-the-counter medicine such as acetaminophen (Tylenol), ibuprofen (Advil, Motrin), or naproxen (Aleve) as long as you do not have an allergy or medical condition that prevents you from taking them.  Do not take two or more pain medicines at the same time unless the doctor told you to. Many pain medicines have acetaminophen, which is Tylenol. Too much acetaminophen (Tylenol) can be harmful.    FOLLOW -UP  You should be scheduled for a post-op appointment in 5-6 weeks at which time we will review your surgery, remove pins and evaluate incisions, review your post-operative program and answer any of your questions.         Randi Seneca, PA-C [facility]

## 2023-08-16 ENCOUNTER — OFFICE VISIT (OUTPATIENT)
Dept: ORTHOPEDICS | Facility: CLINIC | Age: 68
End: 2023-08-16
Payer: MEDICAID

## 2023-08-16 VITALS — HEIGHT: 61 IN | BODY MASS INDEX: 34.74 KG/M2 | WEIGHT: 184 LBS

## 2023-08-16 DIAGNOSIS — Z87.81 H/O REDUCTION OF CLOSED FRACTURE: Primary | ICD-10-CM

## 2023-08-16 DIAGNOSIS — S62.501B OPEN NONDISPLACED FRACTURE OF PHALANX OF RIGHT THUMB, UNSPECIFIED PHALANX, INITIAL ENCOUNTER: Primary | ICD-10-CM

## 2023-08-16 PROCEDURE — 3288F FALL RISK ASSESSMENT DOCD: CPT | Mod: CPTII,S$GLB,,

## 2023-08-16 PROCEDURE — 1125F PR PAIN SEVERITY QUANTIFIED, PAIN PRESENT: ICD-10-PCS | Mod: CPTII,S$GLB,,

## 2023-08-16 PROCEDURE — 3008F PR BODY MASS INDEX (BMI) DOCUMENTED: ICD-10-PCS | Mod: CPTII,S$GLB,,

## 2023-08-16 PROCEDURE — 1159F PR MEDICATION LIST DOCUMENTED IN MEDICAL RECORD: ICD-10-PCS | Mod: CPTII,S$GLB,,

## 2023-08-16 PROCEDURE — 99024 POSTOP FOLLOW-UP VISIT: CPT | Mod: S$GLB,,,

## 2023-08-16 PROCEDURE — 3008F BODY MASS INDEX DOCD: CPT | Mod: CPTII,S$GLB,,

## 2023-08-16 PROCEDURE — 3288F PR FALLS RISK ASSESSMENT DOCUMENTED: ICD-10-PCS | Mod: CPTII,S$GLB,,

## 2023-08-16 PROCEDURE — 4010F PR ACE/ARB THEARPY RXD/TAKEN: ICD-10-PCS | Mod: CPTII,S$GLB,,

## 2023-08-16 PROCEDURE — 4010F ACE/ARB THERAPY RXD/TAKEN: CPT | Mod: CPTII,S$GLB,,

## 2023-08-16 PROCEDURE — 99999 PR PBB SHADOW E&M-EST. PATIENT-LVL III: CPT | Mod: PBBFAC,,,

## 2023-08-16 PROCEDURE — 99024 PR POST-OP FOLLOW-UP VISIT: ICD-10-PCS | Mod: S$GLB,,,

## 2023-08-16 PROCEDURE — 1159F MED LIST DOCD IN RCRD: CPT | Mod: CPTII,S$GLB,,

## 2023-08-16 PROCEDURE — 1100F PTFALLS ASSESS-DOCD GE2>/YR: CPT | Mod: CPTII,S$GLB,,

## 2023-08-16 PROCEDURE — 99999 PR PBB SHADOW E&M-EST. PATIENT-LVL III: ICD-10-PCS | Mod: PBBFAC,,,

## 2023-08-16 PROCEDURE — 1100F PR PT FALLS ASSESS DOC 2+ FALLS/FALL W/INJURY/YR: ICD-10-PCS | Mod: CPTII,S$GLB,,

## 2023-08-16 PROCEDURE — 1125F AMNT PAIN NOTED PAIN PRSNT: CPT | Mod: CPTII,S$GLB,,

## 2023-08-16 RX ORDER — HYDROCODONE BITARTRATE AND ACETAMINOPHEN 5; 325 MG/1; MG/1
1 TABLET ORAL EVERY 6 HOURS PRN
Qty: 15 TABLET | Refills: 0 | Status: SHIPPED | OUTPATIENT
Start: 2023-08-16 | End: 2024-03-02

## 2023-09-27 ENCOUNTER — OFFICE VISIT (OUTPATIENT)
Dept: ORTHOPEDICS | Facility: CLINIC | Age: 68
End: 2023-09-27
Payer: MEDICARE

## 2023-09-27 ENCOUNTER — HOSPITAL ENCOUNTER (OUTPATIENT)
Dept: RADIOLOGY | Facility: HOSPITAL | Age: 68
Discharge: HOME | End: 2023-09-27
Attending: ORTHOPAEDIC SURGERY
Payer: MEDICARE

## 2023-09-27 VITALS — HEIGHT: 61 IN | BODY MASS INDEX: 34.74 KG/M2 | WEIGHT: 184 LBS

## 2023-09-27 DIAGNOSIS — S62.511D CLOSED DISPLACED FRACTURE OF PROXIMAL PHALANX OF RIGHT THUMB WITH ROUTINE HEALING, SUBSEQUENT ENCOUNTER: Primary | ICD-10-CM

## 2023-09-27 DIAGNOSIS — S62.501B OPEN NONDISPLACED FRACTURE OF PHALANX OF RIGHT THUMB, UNSPECIFIED PHALANX, INITIAL ENCOUNTER: ICD-10-CM

## 2023-09-27 PROCEDURE — 1126F PR PAIN SEVERITY QUANTIFIED, NO PAIN PRESENT: ICD-10-PCS | Mod: CPTII,S$GLB,, | Performed by: ORTHOPAEDIC SURGERY

## 2023-09-27 PROCEDURE — 1159F PR MEDICATION LIST DOCUMENTED IN MEDICAL RECORD: ICD-10-PCS | Mod: CPTII,S$GLB,, | Performed by: ORTHOPAEDIC SURGERY

## 2023-09-27 PROCEDURE — 4010F PR ACE/ARB THEARPY RXD/TAKEN: ICD-10-PCS | Mod: CPTII,S$GLB,, | Performed by: ORTHOPAEDIC SURGERY

## 2023-09-27 PROCEDURE — 73140 X-RAY EXAM OF FINGER(S): CPT | Mod: TC,RT

## 2023-09-27 PROCEDURE — 3288F FALL RISK ASSESSMENT DOCD: CPT | Mod: CPTII,S$GLB,, | Performed by: ORTHOPAEDIC SURGERY

## 2023-09-27 PROCEDURE — 99024 POSTOP FOLLOW-UP VISIT: CPT | Mod: S$GLB,,, | Performed by: ORTHOPAEDIC SURGERY

## 2023-09-27 PROCEDURE — 1101F PT FALLS ASSESS-DOCD LE1/YR: CPT | Mod: CPTII,S$GLB,, | Performed by: ORTHOPAEDIC SURGERY

## 2023-09-27 PROCEDURE — 99999 PR PBB SHADOW E&M-EST. PATIENT-LVL III: CPT | Mod: PBBFAC,,, | Performed by: ORTHOPAEDIC SURGERY

## 2023-09-27 PROCEDURE — 99999 PR PBB SHADOW E&M-EST. PATIENT-LVL III: ICD-10-PCS | Mod: PBBFAC,,, | Performed by: ORTHOPAEDIC SURGERY

## 2023-09-27 PROCEDURE — 3288F PR FALLS RISK ASSESSMENT DOCUMENTED: ICD-10-PCS | Mod: CPTII,S$GLB,, | Performed by: ORTHOPAEDIC SURGERY

## 2023-09-27 PROCEDURE — 3008F BODY MASS INDEX DOCD: CPT | Mod: CPTII,S$GLB,, | Performed by: ORTHOPAEDIC SURGERY

## 2023-09-27 PROCEDURE — 1159F MED LIST DOCD IN RCRD: CPT | Mod: CPTII,S$GLB,, | Performed by: ORTHOPAEDIC SURGERY

## 2023-09-27 PROCEDURE — 4010F ACE/ARB THERAPY RXD/TAKEN: CPT | Mod: CPTII,S$GLB,, | Performed by: ORTHOPAEDIC SURGERY

## 2023-09-27 PROCEDURE — 99024 PR POST-OP FOLLOW-UP VISIT: ICD-10-PCS | Mod: S$GLB,,, | Performed by: ORTHOPAEDIC SURGERY

## 2023-09-27 PROCEDURE — 1101F PR PT FALLS ASSESS DOC 0-1 FALLS W/OUT INJ PAST YR: ICD-10-PCS | Mod: CPTII,S$GLB,, | Performed by: ORTHOPAEDIC SURGERY

## 2023-09-27 PROCEDURE — 1126F AMNT PAIN NOTED NONE PRSNT: CPT | Mod: CPTII,S$GLB,, | Performed by: ORTHOPAEDIC SURGERY

## 2023-09-27 PROCEDURE — 1160F PR REVIEW ALL MEDS BY PRESCRIBER/CLIN PHARMACIST DOCUMENTED: ICD-10-PCS | Mod: CPTII,S$GLB,, | Performed by: ORTHOPAEDIC SURGERY

## 2023-09-27 PROCEDURE — 1160F RVW MEDS BY RX/DR IN RCRD: CPT | Mod: CPTII,S$GLB,, | Performed by: ORTHOPAEDIC SURGERY

## 2023-09-27 PROCEDURE — 73140 X-RAY EXAM OF FINGER(S): CPT | Mod: 26,RT,, | Performed by: RADIOLOGY

## 2023-09-27 PROCEDURE — 73140 XR FINGER 2 OR MORE VIEWS: ICD-10-PCS | Mod: 26,RT,, | Performed by: RADIOLOGY

## 2023-09-27 PROCEDURE — 3008F PR BODY MASS INDEX (BMI) DOCUMENTED: ICD-10-PCS | Mod: CPTII,S$GLB,, | Performed by: ORTHOPAEDIC SURGERY

## 2023-09-27 NOTE — PROGRESS NOTES
Subjective:     Patient ID: Jacqueline Luna is a 67 y.o. female.    Chief Complaint: Pain and Injury of the Right Hand      HPI:  The patient is a 67-year-old female status post closed reduction and pinning right thumb oblique fracture proximal phalanx 08/11/2023.  She reports for pin removal today.    Past Medical History:   Diagnosis Date    Anxiety disorder, unspecified     COPD (chronic obstructive pulmonary disease)     Depression     Family history of malignant neoplasm of breast 8/8/2023    Fibrocystic breast     HTN (hypertension)     Hypercholesteremia     Mass of multiple sites of left breast 08/07/2023    Mass of multiple sites of right breast 08/07/2023    Mastodynia 8/8/2023    Mild intermittent asthma, uncomplicated     Type 2 diabetes mellitus without complications      Past Surgical History:   Procedure Laterality Date    CLOSED REDUCTION, FINGER, WITH PINNING Right 8/11/2023    Procedure: CLOSED REDUCTION, FINGER, WITH PINNING;  Surgeon: Nawaf Mcclure MD;  Location: [facility] OR;  Service: Orthopedics;  Laterality: Right;  closed reduction and pinning right thumb proximal phalanx fracture    CORONARY ANGIOGRAPHY N/A 3/12/2021    Procedure: ANGIOGRAM, CORONARY ARTERY;  Surgeon: Shruti Harrison MD;  Location: [facility] CATH LAB;  Service: Cardiology;  Laterality: N/A;    CORONARY ANGIOPLASTY WITH STENT PLACEMENT N/A 3/12/2021    Procedure: Angioplasty, Coronary Artery, With Stent Insertion;  Surgeon: Shruti Harrison MD;  Location: [facility] CATH LAB;  Service: Cardiology;  Laterality: N/A;    LEFT HEART CATHETERIZATION Left 3/12/2021    Procedure: CATHETERIZATION, HEART, LEFT;  Surgeon: Shruti Harrison MD;  Location: [facility] CATH LAB;  Service: Cardiology;  Laterality: Left;     Family History   Problem Relation Age of Onset    Diabetes Mother     Hypertension Mother     Cancer Mother     Cancer Father     COPD Father     Breast cancer Sister         dx 40's    Breast cancer Sister 74    Breast cancer  Maternal Aunt     Breast cancer Maternal Aunt     Ovarian cancer Neg Hx      Social History     Socioeconomic History    Marital status: Single   Tobacco Use    Smoking status: Former     Current packs/day: 0.00     Average packs/day: 0.5 packs/day for 35.2 years (17.6 ttl pk-yrs)     Types: Cigarettes     Start date: 1986     Quit date: 3/5/2021     Years since quittin.5   Substance and Sexual Activity    Alcohol use: Not Currently    Drug use: Never    Sexual activity: Not Currently     Birth control/protection: Abstinence     Medication List with Changes/Refills   Current Medications    ALBUTEROL (PROVENTIL) 2.5 MG /3 ML (0.083 %) NEBULIZER SOLUTION    Take 3 mLs (2.5 mg total) by nebulization every 4 to 6 hours as needed for Wheezing or Shortness of Breath.    ALBUTEROL (PROVENTIL/VENTOLIN HFA) 90 MCG/ACTUATION INHALER    Inhale 2 puffs into the lungs every 4 (four) hours as needed for Wheezing or Shortness of Breath. Rescue    AMLODIPINE (NORVASC) 10 MG TABLET    Take 10 mg by mouth once daily.    ASPIRIN (ECOTRIN) 81 MG EC TABLET    Take 1 tablet (81 mg total) by mouth once daily.    ATORVASTATIN (LIPITOR) 80 MG TABLET    Take 1 tablet (80 mg total) by mouth every evening.    BLOOD SUGAR DIAGNOSTIC (TRUE METRIX GLUCOSE TEST STRIP) STRP    2 (two) times daily.    BLOOD-GLUCOSE METER KIT    Use as instructed    CLOPIDOGREL (PLAVIX) 75 MG TABLET    Take 75 mg by mouth once daily.    EZETIMIBE (ZETIA) 10 MG TABLET    Take 10 mg by mouth once daily.    FLUTICASONE-SALMETEROL DISKUS INHALER 250-50 MCG    Inhale 1 puff into the lungs 2 (two) times daily. Wash out mouth after use.    FUROSEMIDE (LASIX) 20 MG TABLET    Take 1 tablet (20 mg total) by mouth daily as needed (swelling).    HYDROCODONE-ACETAMINOPHEN (NORCO) 5-325 MG PER TABLET    Take 1 tablet by mouth every 6 (six) hours as needed for Pain.    IBUPROFEN (ADVIL,MOTRIN) 800 MG TABLET    Take 800 mg by mouth every 4 (four) hours as needed.    LANCETS  (TRUEPLUS LANCETS) 33 GAUGE MISC    2 (two) times daily.    LISINOPRIL-HYDROCHLOROTHIAZIDE (PRINZIDE,ZESTORETIC) 20-12.5 MG PER TABLET    Take 1 tablet by mouth 2 (two) times a day.    METFORMIN (GLUCOPHAGE-XR) 500 MG ER 24HR TABLET    metformin  mg tablet,extended release 24 hr    METOPROLOL SUCCINATE (TOPROL-XL) 25 MG 24 HR TABLET    TAKE 1 TABLET EVERY DAY    SERTRALINE (ZOLOFT) 100 MG TABLET    Take 100 mg by mouth once daily.    TRUE METRIX AIR GLUCOSE METER KIT        TRUE METRIX GLUCOSE TEST STRIP STRP    SMARTSIG:Via Meter    TRUEPLUS LANCETS 33 GAUGE MISC         Review of patient's allergies indicates:  No Known Allergies  Review of Systems   Constitutional: Negative for malaise/fatigue.   HENT:  Negative for hearing loss.    Eyes:  Negative for double vision and visual disturbance.   Cardiovascular:  Positive for chest pain.   Respiratory:  Negative for shortness of breath.    Endocrine: Negative for cold intolerance.   Hematologic/Lymphatic: Does not bruise/bleed easily.   Skin:  Negative for poor wound healing and suspicious lesions.   Musculoskeletal:  Negative for gout, joint pain and joint swelling.   Gastrointestinal:  Negative for nausea and vomiting.   Genitourinary:  Negative for dysuria.   Neurological:  Negative for numbness, paresthesias and sensory change.   Psychiatric/Behavioral:  Negative for depression, memory loss and substance abuse. The patient is not nervous/anxious.    Allergic/Immunologic: Negative for persistent infections.       Objective:   Body mass index is 34.77 kg/m².  There were no vitals filed for this visit.             Ortho/SPM Exam    Relevant imaging results reviewed and interpreted by me, discussed with the patient and / or family today radiographs right thumb showed 3 K-wires in good position and anatomic position of the fracture with interval signs of healing  Assessment:     Encounter Diagnosis   Name Primary?    Closed displaced fracture of proximal phalanx  of right thumb with routine healing, subsequent encounter Yes        Plan:     The patient had pin removal right thumb x3 using 3 cc of 2% plain lidocaine for local anesthetic.  Under sterile technique the right hand was prepped and draped in the usual sterile fashion.  Satisfactory anesthesia had been achieved a stab wound was made over the palpable pins all 3 pins were grasped with sterile needle drivers and removed without difficulty.  No suture was used.  Xeroform gauze 4x4s and a 3 in gauze dressing was applied.  Wound care was discussed.  She work on motion.  She declines referral to physical therapy.  She will return on an as needed basis.                Disclaimer: This note was prepared using a voice recognition system and is likely to have sound alike errors within the text.

## 2023-09-27 NOTE — PROCEDURES
Small Joint Aspiration/Injection: R thumb IP    Date/Time: 9/27/2023 1:00 PM    Performed by: Nawaf Mcclure MD  Authorized by: Nawaf Mcclure MD    Consent Done?:  Yes (Verbal)  Indications:  Pain  Site marked: the procedure site was marked    Timeout: prior to procedure the correct patient, procedure, and site was verified    Prep: patient was prepped and draped in usual sterile fashion      Local anesthesia used?: Yes    Local anesthetic:  Lidocaine 2% without epinephrine  Anesthetic total (ml):  3    Location:  Thumb  Site:  R thumb IP  Ultrasonic guidance for needle placement?: No    Needle size:  25 G  Approach:  Lateral

## 2023-11-16 DIAGNOSIS — Z95.5 S/P CORONARY ARTERY STENT PLACEMENT: ICD-10-CM

## 2023-11-16 RX ORDER — METOPROLOL SUCCINATE 25 MG/1
TABLET, EXTENDED RELEASE ORAL
Qty: 90 TABLET | Refills: 10 | Status: SHIPPED | OUTPATIENT
Start: 2023-11-16 | End: 2024-03-19

## 2024-01-29 RX ORDER — CLOPIDOGREL BISULFATE 75 MG/1
75 TABLET ORAL
Qty: 90 TABLET | Refills: 0 | Status: SHIPPED | OUTPATIENT
Start: 2024-01-29 | End: 2024-04-11

## 2024-02-14 DIAGNOSIS — J43.9 PULMONARY EMPHYSEMA, UNSPECIFIED EMPHYSEMA TYPE: ICD-10-CM

## 2024-02-15 RX ORDER — ALBUTEROL SULFATE 90 UG/1
2 AEROSOL, METERED RESPIRATORY (INHALATION) EVERY 4 HOURS PRN
Qty: 54 G | Refills: 3 | Status: SHIPPED | OUTPATIENT
Start: 2024-02-15

## 2024-03-02 ENCOUNTER — HOSPITAL ENCOUNTER (EMERGENCY)
Facility: HOSPITAL | Age: 69
Discharge: HOME | End: 2024-03-02
Attending: FAMILY MEDICINE
Payer: MEDICARE

## 2024-03-02 VITALS
TEMPERATURE: 98 F | HEART RATE: 63 BPM | RESPIRATION RATE: 17 BRPM | HEIGHT: 61 IN | OXYGEN SATURATION: 100 % | DIASTOLIC BLOOD PRESSURE: 79 MMHG | BODY MASS INDEX: 34.99 KG/M2 | WEIGHT: 185.31 LBS | SYSTOLIC BLOOD PRESSURE: 168 MMHG

## 2024-03-02 DIAGNOSIS — N30.00 ACUTE CYSTITIS WITHOUT HEMATURIA: Primary | ICD-10-CM

## 2024-03-02 LAB
ALBUMIN SERPL BCP-MCNC: 3.4 G/DL (ref 3.5–5.2)
ALP SERPL-CCNC: 116 U/L (ref 55–135)
ALT SERPL W/O P-5'-P-CCNC: 10 U/L (ref 10–44)
ANION GAP SERPL CALC-SCNC: 11 MMOL/L (ref 8–16)
AST SERPL-CCNC: 16 U/L (ref 10–40)
BACTERIA #/AREA URNS HPF: ABNORMAL /HPF
BASOPHILS # BLD AUTO: 0.07 K/UL (ref 0–0.2)
BASOPHILS NFR BLD: 0.4 % (ref 0–1.9)
BILIRUB SERPL-MCNC: 0.3 MG/DL (ref 0.1–1)
BILIRUB UR QL STRIP: NEGATIVE
BUN SERPL-MCNC: 11 MG/DL (ref 8–23)
CALCIUM SERPL-MCNC: 9.9 MG/DL (ref 8.7–10.5)
CHLORIDE SERPL-SCNC: 100 MMOL/L (ref 95–110)
CLARITY UR: ABNORMAL
CO2 SERPL-SCNC: 30 MMOL/L (ref 23–29)
COLOR UR: YELLOW
CREAT SERPL-MCNC: 0.8 MG/DL (ref 0.5–1.4)
DIFFERENTIAL METHOD BLD: ABNORMAL
EOSINOPHIL # BLD AUTO: 0.3 K/UL (ref 0–0.5)
EOSINOPHIL NFR BLD: 1.9 % (ref 0–8)
ERYTHROCYTE [DISTWIDTH] IN BLOOD BY AUTOMATED COUNT: 14.2 % (ref 11.5–14.5)
EST. GFR  (NO RACE VARIABLE): >60 ML/MIN/1.73 M^2
GLUCOSE SERPL-MCNC: 98 MG/DL (ref 70–110)
GLUCOSE UR QL STRIP: NEGATIVE
HCT VFR BLD AUTO: 41 % (ref 37–48.5)
HCV AB SERPL QL IA: NEGATIVE
HEP C VIRUS HOLD SPECIMEN: NORMAL
HGB BLD-MCNC: 13 G/DL (ref 12–16)
HGB UR QL STRIP: NEGATIVE
HIV 1+2 AB+HIV1 P24 AG SERPL QL IA: NEGATIVE
HYALINE CASTS #/AREA URNS LPF: 1 /LPF
IMM GRANULOCYTES # BLD AUTO: 0.1 K/UL (ref 0–0.04)
IMM GRANULOCYTES NFR BLD AUTO: 0.6 % (ref 0–0.5)
KETONES UR QL STRIP: NEGATIVE
LEUKOCYTE ESTERASE UR QL STRIP: ABNORMAL
LIPASE SERPL-CCNC: 29 U/L (ref 4–60)
LYMPHOCYTES # BLD AUTO: 4.4 K/UL (ref 1–4.8)
LYMPHOCYTES NFR BLD: 25.6 % (ref 18–48)
MCH RBC QN AUTO: 24.8 PG (ref 27–31)
MCHC RBC AUTO-ENTMCNC: 31.7 G/DL (ref 32–36)
MCV RBC AUTO: 78 FL (ref 82–98)
MICROSCOPIC COMMENT: ABNORMAL
MONOCYTES # BLD AUTO: 1 K/UL (ref 0.3–1)
MONOCYTES NFR BLD: 5.7 % (ref 4–15)
NEUTROPHILS # BLD AUTO: 11.2 K/UL (ref 1.8–7.7)
NEUTROPHILS NFR BLD: 65.8 % (ref 38–73)
NITRITE UR QL STRIP: NEGATIVE
NRBC BLD-RTO: 0 /100 WBC
PH UR STRIP: 5 [PH] (ref 5–8)
PLATELET # BLD AUTO: 382 K/UL (ref 150–450)
PMV BLD AUTO: 9.3 FL (ref 9.2–12.9)
POTASSIUM SERPL-SCNC: 3.2 MMOL/L (ref 3.5–5.1)
PROT SERPL-MCNC: 7.6 G/DL (ref 6–8.4)
PROT UR QL STRIP: NEGATIVE
RBC # BLD AUTO: 5.25 M/UL (ref 4–5.4)
RBC #/AREA URNS HPF: 7 /HPF (ref 0–4)
SODIUM SERPL-SCNC: 141 MMOL/L (ref 136–145)
SP GR UR STRIP: 1.02 (ref 1–1.03)
SQUAMOUS #/AREA URNS HPF: 10 /HPF
UNIDENT CRYS URNS QL MICRO: ABNORMAL
URN SPEC COLLECT METH UR: ABNORMAL
UROBILINOGEN UR STRIP-ACNC: NEGATIVE EU/DL
WBC # BLD AUTO: 16.97 K/UL (ref 3.9–12.7)
WBC #/AREA URNS HPF: 14 /HPF (ref 0–5)
WBC CLUMPS URNS QL MICRO: ABNORMAL

## 2024-03-02 PROCEDURE — 63600175 PHARM REV CODE 636 W HCPCS: Performed by: FAMILY MEDICINE

## 2024-03-02 PROCEDURE — 87186 SC STD MICRODIL/AGAR DIL: CPT | Performed by: NURSE PRACTITIONER

## 2024-03-02 PROCEDURE — 99285 EMERGENCY DEPT VISIT HI MDM: CPT | Mod: 25

## 2024-03-02 PROCEDURE — 86803 HEPATITIS C AB TEST: CPT | Performed by: EMERGENCY MEDICINE

## 2024-03-02 PROCEDURE — 87389 HIV-1 AG W/HIV-1&-2 AB AG IA: CPT | Performed by: EMERGENCY MEDICINE

## 2024-03-02 PROCEDURE — 87077 CULTURE AEROBIC IDENTIFY: CPT | Performed by: NURSE PRACTITIONER

## 2024-03-02 PROCEDURE — 96375 TX/PRO/DX INJ NEW DRUG ADDON: CPT

## 2024-03-02 PROCEDURE — 87088 URINE BACTERIA CULTURE: CPT | Performed by: NURSE PRACTITIONER

## 2024-03-02 PROCEDURE — 96365 THER/PROPH/DIAG IV INF INIT: CPT

## 2024-03-02 PROCEDURE — 96361 HYDRATE IV INFUSION ADD-ON: CPT

## 2024-03-02 PROCEDURE — 81000 URINALYSIS NONAUTO W/SCOPE: CPT | Performed by: NURSE PRACTITIONER

## 2024-03-02 PROCEDURE — 83690 ASSAY OF LIPASE: CPT | Performed by: NURSE PRACTITIONER

## 2024-03-02 PROCEDURE — 63600175 PHARM REV CODE 636 W HCPCS: Performed by: NURSE PRACTITIONER

## 2024-03-02 PROCEDURE — 85025 COMPLETE CBC W/AUTO DIFF WBC: CPT | Performed by: NURSE PRACTITIONER

## 2024-03-02 PROCEDURE — 87086 URINE CULTURE/COLONY COUNT: CPT | Performed by: NURSE PRACTITIONER

## 2024-03-02 PROCEDURE — 25000003 PHARM REV CODE 250: Performed by: FAMILY MEDICINE

## 2024-03-02 PROCEDURE — 80053 COMPREHEN METABOLIC PANEL: CPT | Performed by: NURSE PRACTITIONER

## 2024-03-02 RX ORDER — ASPIRIN 325 MG
50000 TABLET, DELAYED RELEASE (ENTERIC COATED) ORAL
COMMUNITY
Start: 2023-12-18

## 2024-03-02 RX ORDER — ONDANSETRON HYDROCHLORIDE 2 MG/ML
4 INJECTION, SOLUTION INTRAVENOUS
Status: COMPLETED | OUTPATIENT
Start: 2024-03-02 | End: 2024-03-02

## 2024-03-02 RX ORDER — TRAMADOL HYDROCHLORIDE 50 MG/1
50 TABLET ORAL
Status: COMPLETED | OUTPATIENT
Start: 2024-03-02 | End: 2024-03-02

## 2024-03-02 RX ORDER — KETOROLAC TROMETHAMINE 30 MG/ML
15 INJECTION, SOLUTION INTRAMUSCULAR; INTRAVENOUS
Status: COMPLETED | OUTPATIENT
Start: 2024-03-02 | End: 2024-03-02

## 2024-03-02 RX ORDER — CEFDINIR 300 MG/1
300 CAPSULE ORAL 2 TIMES DAILY
Qty: 20 CAPSULE | Refills: 0 | Status: SHIPPED | OUTPATIENT
Start: 2024-03-02 | End: 2024-03-12

## 2024-03-02 RX ORDER — PANTOPRAZOLE SODIUM 40 MG/1
40 TABLET, DELAYED RELEASE ORAL DAILY
COMMUNITY
Start: 2024-01-11

## 2024-03-02 RX ADMIN — CEFTRIAXONE 1 G: 1 INJECTION, POWDER, FOR SOLUTION INTRAMUSCULAR; INTRAVENOUS at 07:03

## 2024-03-02 RX ADMIN — TRAMADOL HYDROCHLORIDE 50 MG: 50 TABLET, COATED ORAL at 07:03

## 2024-03-02 RX ADMIN — ONDANSETRON 4 MG: 2 INJECTION INTRAMUSCULAR; INTRAVENOUS at 06:03

## 2024-03-02 RX ADMIN — KETOROLAC TROMETHAMINE 15 MG: 30 INJECTION, SOLUTION INTRAMUSCULAR; INTRAVENOUS at 06:03

## 2024-03-02 RX ADMIN — SODIUM CHLORIDE 1000 ML: 9 INJECTION, SOLUTION INTRAVENOUS at 06:03

## 2024-03-02 NOTE — FIRST PROVIDER EVALUATION
"Medical screening examination initiated.  I have conducted a focused provider triage encounter, findings are as follows:    Brief history of present illness:  Patient reports lower abdominal along with bilateral flank pain with nausea and vomiting    Vitals:    03/02/24 1721   BP: (!) 161/61   BP Location: Right arm   Patient Position: Sitting   Pulse: 72   Resp: 16   Temp: 98.4 °F (36.9 °C)   TempSrc: Oral   SpO2: 98%   Weight: 84.1 kg (185 lb 4.8 oz)   Height: 5' 1" (1.549 m)       Pertinent physical exam:  No acute distress    Brief workup plan:  Labs, imaging, meds, further eval    Preliminary workup initiated; this workup will be continued and followed by the physician or advanced practice provider that is assigned to the patient when roomed.  "

## 2024-03-03 NOTE — ED PROVIDER NOTES
SCRIBE #1 NOTE: I, Me-Zeny Krueger, am scribing for, and in the presence of, Valery Parker MD. I have scribed the entire note.       History     Chief Complaint   Patient presents with    Back Pain     Lower back pain radiating around to right side and along RLQ, also pain to left side but not as bad. Hx prolapsed bladder, and uterine fibroids. Pt on ABX for bladder infection.     Review of patient's allergies indicates:  No Known Allergies      History of Present Illness     HPI    3/2/2024, 6:25 PM  History obtained from the patient      History of Present Illness: Jacqueline Luna is a 68 y.o. female patient with a PMHx of anxiety disorder, COPD, depression, fibrocystic breast, HTN, hypercholesteremia, mastodynia, and DM2 who presents to the Emergency Department for evaluation of lower back pain which onset a couple days ago. Pt states that the pain radiates to her right side. Pain also feels pain to her L side but pt states that it is not as bad. Symptoms are constant and moderate in severity. Pain worsens with movement. Associated sxs include nausea, vomiting, and diarrhea. Patient denies any dysuria, difficulty urinating, fever, CP, SOB, and all other sxs at this time. No prior Tx reported. Pt has a PMHx of prolapsed bladder and uterine fibroids, and pt is on antibiotics for bladder infection. Pt also had a pessary placement recently. Pt states that she will see her urologist on 3/14/2024. No further complaints or concerns at this time.       Arrival mode: Personal vehicle     PCP: Alayna Greenwood PA        Past Medical History:  Past Medical History:   Diagnosis Date    Anxiety disorder, unspecified     COPD (chronic obstructive pulmonary disease)     Depression     Family history of malignant neoplasm of breast 8/8/2023    Fibrocystic breast     HTN (hypertension)     Hypercholesteremia     Mass of multiple sites of left breast 08/07/2023    Mass of multiple sites of right breast 08/07/2023     Mastodynia 2023    Mild intermittent asthma, uncomplicated     Type 2 diabetes mellitus without complications        Past Surgical History:  Past Surgical History:   Procedure Laterality Date    CLOSED REDUCTION, FINGER, WITH PINNING Right 2023    Procedure: CLOSED REDUCTION, FINGER, WITH PINNING;  Surgeon: Nawaf Mcclure MD;  Location: [facility] OR;  Service: Orthopedics;  Laterality: Right;  closed reduction and pinning right thumb proximal phalanx fracture    CORONARY ANGIOGRAPHY N/A 3/12/2021    Procedure: ANGIOGRAM, CORONARY ARTERY;  Surgeon: Shruti Harrison MD;  Location: [facility] CATH LAB;  Service: Cardiology;  Laterality: N/A;    CORONARY ANGIOPLASTY WITH STENT PLACEMENT N/A 3/12/2021    Procedure: Angioplasty, Coronary Artery, With Stent Insertion;  Surgeon: Shruti Harrison MD;  Location: [facility] CATH LAB;  Service: Cardiology;  Laterality: N/A;    LEFT HEART CATHETERIZATION Left 3/12/2021    Procedure: CATHETERIZATION, HEART, LEFT;  Surgeon: Shruti Harrison MD;  Location: [facility] CATH LAB;  Service: Cardiology;  Laterality: Left;         Family History:  Family History   Problem Relation Age of Onset    Diabetes Mother     Hypertension Mother     Cancer Mother     Cancer Father     COPD Father     Breast cancer Sister         dx 40's    Breast cancer Sister 74    Breast cancer Maternal Aunt     Breast cancer Maternal Aunt     Ovarian cancer Neg Hx        Social History:  Social History     Tobacco Use    Smoking status: Former     Current packs/day: 0.00     Average packs/day: 0.5 packs/day for 35.2 years (17.6 ttl pk-yrs)     Types: Cigarettes     Start date: 1986     Quit date: 3/5/2021     Years since quittin.9    Smokeless tobacco: Not on file   Substance and Sexual Activity    Alcohol use: Not Currently    Drug use: Never    Sexual activity: Not Currently     Birth control/protection: Abstinence        Review of Systems     Review of Systems   Constitutional:  Negative for fever.    HENT:  Negative for sore throat.    Respiratory:  Negative for shortness of breath.    Cardiovascular:  Negative for chest pain.   Gastrointestinal:  Positive for abdominal pain (R-sided), diarrhea, nausea and vomiting.   Genitourinary:  Positive for flank pain (R). Negative for difficulty urinating and dysuria.   Musculoskeletal:  Positive for back pain (lower).   Skin:  Negative for rash.   Neurological:  Negative for weakness.   All other systems reviewed and are negative.     Physical Exam     Initial Vitals [03/02/24 1721]   BP Pulse Resp Temp SpO2   (!) 161/61 72 16 98.4 °F (36.9 °C) 98 %      MAP       --          Physical Exam  Nursing Notes and Vital Signs Reviewed.  Constitutional: Patient is in no acute distress. Well-developed and well-nourished.  Head: Atraumatic. Normocephalic.  Eyes: PERRL. EOM intact. Conjunctivae are not pale. No scleral icterus.  ENT: Mucous membranes are moist. Oropharynx is clear and symmetric.    Neck: Supple. Full ROM. No lymphadenopathy.  Cardiovascular: Regular rate. Regular rhythm. No murmurs, rubs, or gallops. Distal pulses are 2+ and symmetric.  Pulmonary/Chest: No respiratory distress. Clear to auscultation bilaterally. No wheezing or rales.  Abdominal: Soft and non-distended. Suprapubic tenderness.  No rebound, guarding, or rigidity. Good bowel sounds.  Genitourinary:  R flank tenderness.   Musculoskeletal: Moves all extremities. No obvious deformities. No edema. No calf tenderness.  Skin: Warm and dry.  Neurological:  Alert, awake, and appropriate.  Normal speech.  No acute focal neurological deficits are appreciated.  Psychiatric: Normal affect. Good eye contact. Appropriate in content.     ED Course   Procedures  ED Vital Signs:  Vitals:    03/02/24 1721 03/02/24 1820 03/02/24 1855 03/02/24 1911   BP: (!) 161/61 (!) 149/70 (!) 149/70 (!) 168/79   Pulse: 72 72 65 63   Resp: 16 16 16 17   Temp: 98.4 °F (36.9 °C)  98.4 °F (36.9 °C) 98.4 °F (36.9 °C)   TempSrc: Oral   "Oral    SpO2: 98% 98%  100%   Weight: 84.1 kg (185 lb 4.8 oz)      Height: 5' 1" (1.549 m)       03/02/24 1959   BP: (!) 168/79   Pulse: 63   Resp: 17   Temp: 98.4 °F (36.9 °C)   TempSrc: Oral   SpO2:    Weight:    Height:        Abnormal Lab Results:  Labs Reviewed   CBC W/ AUTO DIFFERENTIAL - Abnormal; Notable for the following components:       Result Value    WBC 16.97 (*)     MCV 78 (*)     MCH 24.8 (*)     MCHC 31.7 (*)     Immature Granulocytes 0.6 (*)     Gran # (ANC) 11.2 (*)     Immature Grans (Abs) 0.10 (*)     All other components within normal limits    Narrative:     Release to patient->Immediate   COMPREHENSIVE METABOLIC PANEL - Abnormal; Notable for the following components:    Potassium 3.2 (*)     CO2 30 (*)     Albumin 3.4 (*)     All other components within normal limits    Narrative:     Release to patient->Immediate   URINALYSIS, REFLEX TO URINE CULTURE - Abnormal; Notable for the following components:    Appearance, UA Hazy (*)     Leukocytes, UA 3+ (*)     All other components within normal limits    Narrative:     Specimen Source->Urine   URINALYSIS MICROSCOPIC - Abnormal; Notable for the following components:    RBC, UA 7 (*)     WBC, UA 14 (*)     WBC Clumps, UA Few (*)     All other components within normal limits    Narrative:     Specimen Source->Urine   CULTURE, URINE   HIV 1 / 2 ANTIBODY    Narrative:     Release to patient->Immediate   HEPATITIS C ANTIBODY    Narrative:     Release to patient->Immediate   HEP C VIRUS HOLD SPECIMEN    Narrative:     Release to patient->Immediate   LIPASE    Narrative:     Release to patient->Immediate        All Lab Results:  Results for orders placed or performed during the hospital encounter of 03/02/24   HIV 1/2 Ag/Ab (4th Gen)   Result Value Ref Range    HIV 1/2 Ag/Ab Negative Negative   Hepatitis C Antibody   Result Value Ref Range    Hepatitis C Ab Negative Negative   HCV Virus Hold Specimen   Result Value Ref Range    HEP C Virus Hold Specimen " Hold for HCV sendout    CBC auto differential   Result Value Ref Range    WBC 16.97 (H) 3.90 - 12.70 K/uL    RBC 5.25 4.00 - 5.40 M/uL    Hemoglobin 13.0 12.0 - 16.0 g/dL    Hematocrit 41.0 37.0 - 48.5 %    MCV 78 (L) 82 - 98 fL    MCH 24.8 (L) 27.0 - 31.0 pg    MCHC 31.7 (L) 32.0 - 36.0 g/dL    RDW 14.2 11.5 - 14.5 %    Platelets 382 150 - 450 K/uL    MPV 9.3 9.2 - 12.9 fL    Immature Granulocytes 0.6 (H) 0.0 - 0.5 %    Gran # (ANC) 11.2 (H) 1.8 - 7.7 K/uL    Immature Grans (Abs) 0.10 (H) 0.00 - 0.04 K/uL    Lymph # 4.4 1.0 - 4.8 K/uL    Mono # 1.0 0.3 - 1.0 K/uL    Eos # 0.3 0.0 - 0.5 K/uL    Baso # 0.07 0.00 - 0.20 K/uL    nRBC 0 0 /100 WBC    Gran % 65.8 38.0 - 73.0 %    Lymph % 25.6 18.0 - 48.0 %    Mono % 5.7 4.0 - 15.0 %    Eosinophil % 1.9 0.0 - 8.0 %    Basophil % 0.4 0.0 - 1.9 %    Differential Method Automated    Comprehensive metabolic panel   Result Value Ref Range    Sodium 141 136 - 145 mmol/L    Potassium 3.2 (L) 3.5 - 5.1 mmol/L    Chloride 100 95 - 110 mmol/L    CO2 30 (H) 23 - 29 mmol/L    Glucose 98 70 - 110 mg/dL    BUN 11 8 - 23 mg/dL    Creatinine 0.8 0.5 - 1.4 mg/dL    Calcium 9.9 8.7 - 10.5 mg/dL    Total Protein 7.6 6.0 - 8.4 g/dL    Albumin 3.4 (L) 3.5 - 5.2 g/dL    Total Bilirubin 0.3 0.1 - 1.0 mg/dL    Alkaline Phosphatase 116 55 - 135 U/L    AST 16 10 - 40 U/L    ALT 10 10 - 44 U/L    eGFR >60 >60 mL/min/1.73 m^2    Anion Gap 11 8 - 16 mmol/L   Lipase   Result Value Ref Range    Lipase 29 4 - 60 U/L   Urinalysis, Reflex to Urine Culture Urine, Clean Catch    Specimen: Urine   Result Value Ref Range    Specimen UA Urine, Clean Catch     Color, UA Yellow Yellow, Straw, Judith    Appearance, UA Hazy (A) Clear    pH, UA 5.0 5.0 - 8.0    Specific Gravity, UA 1.020 1.005 - 1.030    Protein, UA Negative Negative    Glucose, UA Negative Negative    Ketones, UA Negative Negative    Bilirubin (UA) Negative Negative    Occult Blood UA Negative Negative    Nitrite, UA Negative Negative     Urobilinogen, UA Negative <2.0 EU/dL    Leukocytes, UA 3+ (A) Negative   Urinalysis Microscopic   Result Value Ref Range    RBC, UA 7 (H) 0 - 4 /hpf    WBC, UA 14 (H) 0 - 5 /hpf    WBC Clumps, UA Few (A) None-Rare    Bacteria Rare None-Occ /hpf    Squam Epithel, UA 10 /hpf    Hyaline Casts, UA 1 0-1/lpf /lpf    Unclass Ericka UA Occasional None-Moderate    Microscopic Comment SEE COMMENT          Imaging Results:  Imaging Results              CT Renal Stone Study ABD Pelvis WO (Final result)  Result time 03/02/24 18:03:26      Final result by JENNIFER Cano Sr., MD (03/02/24 18:03:26)                   Impression:      1. The kidneys are normal in appearance.  There is no nephrolithiasis visualized.  2. The uterus is heterogeneous in appearance.  There is a 27 mm area of calcification in the fundus of the uterus.  This is characteristic of fibroids.  All CT scans at this facility use dose modulation, iterative reconstruction, and/or weight base dosing when appropriate to reduce radiation dose when appropriate to reduce radiation dose to as low as reasonably achievable.      Electronically signed by: Jasiel Cano MD  Date:    03/02/2024  Time:    18:03               Narrative:    EXAMINATION:  CT RENAL STONE STUDY ABD PELVIS WO    CLINICAL HISTORY:  Flank pain, kidney stone suspected;    TECHNIQUE:  Standard abdomen and pelvis CT protocol without IV contrast was performed.    COMPARISON:  01/17/2023    FINDINGS:  Finding: The size of the heart is within normal limits.  There is a mild amount of scarring in both lungs.  There is no pneumothorax or pleural effusion.    The liver, gallbladder, pancreas, spleen, adrenals, and kidneys are normal in appearance. The ureters and the urinary bladder are normal in appearance.  The uterus is heterogeneous in appearance.  There is a 27 mm area of calcification in the fundus of the uterus.  The appendix and the rest of the gastrointestinal system are normal in appearance.  There is no free fluid within the abdomen or pelvis. There is no pneumoperitoneum.                                           The Emergency Provider reviewed the vital signs and test results, which are outlined above.     ED Discussion     7:55 PM: Reassessed pt at this time. Discussed with pt all pertinent ED information and results. Discussed pt dx and plan of tx. Gave pt all f/u and return to the ED instructions. All questions and concerns were addressed at this time. Pt expresses understanding of information and instructions, and is comfortable with plan to discharge. Pt is stable for discharge.    I discussed with patient and/or family/caretaker that evaluation in the ED does not suggest any emergent or life threatening medical conditions requiring immediate intervention beyond what was provided in the ED, and I believe patient is safe for discharge.  Regardless, an unremarkable evaluation in the ED does not preclude the development or presence of a serious of life threatening condition. As such, patient was instructed to return immediately for any worsening or change in current symptoms.       Medical Decision Making  Amount and/or Complexity of Data Reviewed  Labs: ordered. Decision-making details documented in ED Course.  Radiology: ordered. Decision-making details documented in ED Course.    Risk  Prescription drug management.                ED Medication(s):  Medications   ondansetron injection 4 mg (4 mg Intravenous Given 3/2/24 1805)   ketorolac injection 15 mg (15 mg Intravenous Given 3/2/24 1804)   sodium chloride 0.9% bolus 1,000 mL 1,000 mL (0 mLs Intravenous Stopped 3/2/24 1959)   cefTRIAXone (Rocephin) 1 g in dextrose 5 % in water (D5W) 100 mL IVPB (MB+) (0 g Intravenous Stopped 3/2/24 1959)   traMADoL tablet 50 mg (50 mg Oral Given 3/2/24 1911)       Discharge Medication List as of 3/2/2024  7:39 PM        START taking these medications    Details   cefdinir (OMNICEF) 300 MG capsule Take 1 capsule  (300 mg total) by mouth 2 (two) times daily. for 10 days, Starting Sat 3/2/2024, Until Tue 3/12/2024, Print              Follow-up Information       Schedule an appointment as soon as possible for a visit  with Alayna Greenwood PA.    Specialty: Internal Medicine  Why: As needed  Contact information:  [address] [facility] [address]  [phone]                                 Scribe Attestation:   Scribe #1: I performed the above scribed service and the documentation accurately describes the services I performed. I attest to the accuracy of the note.     Attending:   Physician Attestation Statement for Scribe #1: I, Valery Parker MD, personally performed the services described in this documentation, as scribed by Susi Krueger, in my presence, and it is both accurate and complete.           Clinical Impression       ICD-10-CM ICD-9-CM   1. Acute cystitis without hematuria  N30.00 595.0       Disposition:   Disposition: Discharged  Condition: Stable         Valery Parker MD  03/02/24 [id number]

## 2024-03-05 LAB
BACTERIA UR CULT: ABNORMAL
BACTERIA UR CULT: ABNORMAL

## 2024-03-19 ENCOUNTER — OFFICE VISIT (OUTPATIENT)
Dept: CARDIOLOGY | Facility: CLINIC | Age: 69
End: 2024-03-19
Payer: MEDICARE

## 2024-03-19 VITALS
DIASTOLIC BLOOD PRESSURE: 82 MMHG | SYSTOLIC BLOOD PRESSURE: 138 MMHG | HEIGHT: 61 IN | BODY MASS INDEX: 35.67 KG/M2 | WEIGHT: 188.94 LBS | OXYGEN SATURATION: 97 % | HEART RATE: 78 BPM

## 2024-03-19 DIAGNOSIS — I10 ESSENTIAL HYPERTENSION: ICD-10-CM

## 2024-03-19 DIAGNOSIS — Z95.5 S/P CORONARY ARTERY STENT PLACEMENT: ICD-10-CM

## 2024-03-19 DIAGNOSIS — G47.30 SLEEP APNEA, UNSPECIFIED TYPE: ICD-10-CM

## 2024-03-19 DIAGNOSIS — E78.2 MIXED HYPERLIPIDEMIA: ICD-10-CM

## 2024-03-19 DIAGNOSIS — E66.01 MORBID OBESITY: Primary | ICD-10-CM

## 2024-03-19 PROBLEM — I73.9 CLAUDICATION: Status: ACTIVE | Noted: 2024-03-19

## 2024-03-19 PROCEDURE — 4010F ACE/ARB THERAPY RXD/TAKEN: CPT | Mod: CPTII,S$GLB,, | Performed by: INTERNAL MEDICINE

## 2024-03-19 PROCEDURE — 3288F FALL RISK ASSESSMENT DOCD: CPT | Mod: CPTII,S$GLB,, | Performed by: INTERNAL MEDICINE

## 2024-03-19 PROCEDURE — 99214 OFFICE O/P EST MOD 30 MIN: CPT | Mod: S$GLB,,, | Performed by: INTERNAL MEDICINE

## 2024-03-19 PROCEDURE — 1126F AMNT PAIN NOTED NONE PRSNT: CPT | Mod: CPTII,S$GLB,, | Performed by: INTERNAL MEDICINE

## 2024-03-19 PROCEDURE — 1101F PT FALLS ASSESS-DOCD LE1/YR: CPT | Mod: CPTII,S$GLB,, | Performed by: INTERNAL MEDICINE

## 2024-03-19 PROCEDURE — 3075F SYST BP GE 130 - 139MM HG: CPT | Mod: CPTII,S$GLB,, | Performed by: INTERNAL MEDICINE

## 2024-03-19 PROCEDURE — 3079F DIAST BP 80-89 MM HG: CPT | Mod: CPTII,S$GLB,, | Performed by: INTERNAL MEDICINE

## 2024-03-19 PROCEDURE — 99999 PR PBB SHADOW E&M-EST. PATIENT-LVL III: CPT | Mod: PBBFAC,,, | Performed by: INTERNAL MEDICINE

## 2024-03-19 PROCEDURE — 3008F BODY MASS INDEX DOCD: CPT | Mod: CPTII,S$GLB,, | Performed by: INTERNAL MEDICINE

## 2024-03-19 RX ORDER — METOPROLOL SUCCINATE 50 MG/1
50 TABLET, EXTENDED RELEASE ORAL DAILY
Qty: 90 TABLET | Refills: 3 | Status: SHIPPED | OUTPATIENT
Start: 2024-03-19

## 2024-03-19 NOTE — PROGRESS NOTES
Subjective:   Patient ID:  Jacqueline Luna is a 68 y.o. female who presents for evaluation of No chief complaint on file.        HPI   March 19, 2024.    Comes in for six-month follow-up.    Chest pain.  No unusual dyspnea on exertion.  No lower extremity swelling.    She has infrequent seldom isolated skipped beats.    Sinus on exam today.    Compliant medications.  No bleeding.      8.9.2023  COMES IN FOR A 1 YEAR FOLLOW-UP.    Going for a hand surgery.    She is doing well denies any chest pain.  Her functional status is fair acceptable.  She does all her chores.    Denies any dyspnea lower extremity swelling palpitations syncope or presyncope.    She had her stent 2 years ago.  With nonobstructive disease on the left.      9.1.2022 this is a virtual visit.    Patient has multiple noncardiac complaints.  She complains of stiffness to her and and joints.    She denies any chest pain or dyspnea on exertion.    She complains of fatigue as well.    There is no lower extremity swelling.  Blood pressure under control.    Compliant with medications.    No bleeding.  She is still taking aspirin and Plavix since her stent.  Will continue to aspirin 81 mg and stop the Plavix  Her LINA was normal at rest but with exercise mildly decreased however ultrasound lower extremity was normal.  His CTA a runoff showed moderate disease in the 50% range iliac and SFA is.  A follow-up was made with the patient she denies any more symptoms to her left thigh.    2.8.2022   cw toprol plavix asa, lasix, statin   Has mild james did not follow with pulm   Did not get pft done, states has wheezing , pacheco   Left upper thigh claudication , not better when she leans on the cart   Also has back pain, going for xray lumbar spine   Also states was told her blood count was elevated and underwent evaluation at a cancer center but was told no cancer , KENNETH WHITESIDE NP   No chest pain       4.2021  66 yo female with pmhx below , not a smoker  S/p recent  discharge two weeks ago she presented with an inferior stemi after 3 days of intermittent chest pains   No more chest pain since discharge, no groin issues   Reports Dyspnea at exertion which she relates to her copd , but states that sometimes she gets dyspnea at rest and believes this is new compared to before , ? 2/2 brilinta    Few wheezing episodes, uses albuterol PRN    Reports left leg claudication from thigh to calf with minimal walking   Past Medical History:   Diagnosis Date    Anxiety disorder, unspecified     COPD (chronic obstructive pulmonary disease)     Depression     Family history of malignant neoplasm of breast 8/8/2023    Fibrocystic breast     HTN (hypertension)     Hypercholesteremia     Mass of multiple sites of left breast 08/07/2023    Mass of multiple sites of right breast 08/07/2023    Mastodynia 8/8/2023    Mild intermittent asthma, uncomplicated     Type 2 diabetes mellitus without complications        Past Surgical History:   Procedure Laterality Date    CLOSED REDUCTION, FINGER, WITH PINNING Right 8/11/2023    Procedure: CLOSED REDUCTION, FINGER, WITH PINNING;  Surgeon: Nawaf Mcclure MD;  Location: [facility] OR;  Service: Orthopedics;  Laterality: Right;  closed reduction and pinning right thumb proximal phalanx fracture    CORONARY ANGIOGRAPHY N/A 3/12/2021    Procedure: ANGIOGRAM, CORONARY ARTERY;  Surgeon: Shruti Harrison MD;  Location: [facility] CATH LAB;  Service: Cardiology;  Laterality: N/A;    CORONARY ANGIOPLASTY WITH STENT PLACEMENT N/A 3/12/2021    Procedure: Angioplasty, Coronary Artery, With Stent Insertion;  Surgeon: Shruti Harrison MD;  Location: [facility] CATH LAB;  Service: Cardiology;  Laterality: N/A;    LEFT HEART CATHETERIZATION Left 3/12/2021    Procedure: CATHETERIZATION, HEART, LEFT;  Surgeon: Shruti Harrison MD;  Location: [facility] CATH LAB;  Service: Cardiology;  Laterality: Left;       Social History     Tobacco Use    Smoking status: Former     Current  packs/day: 0.00     Average packs/day: 0.5 packs/day for 35.2 years (17.6 ttl pk-yrs)     Types: Cigarettes     Start date: 1/1/1986     Quit date: 3/5/2021     Years since quitting: 3.0   Substance Use Topics    Alcohol use: Not Currently    Drug use: Never       Family History   Problem Relation Age of Onset    Diabetes Mother     Hypertension Mother     Cancer Mother     Cancer Father     COPD Father     Breast cancer Sister         dx 40's    Breast cancer Sister 74    Breast cancer Maternal Aunt     Breast cancer Maternal Aunt     Ovarian cancer Neg Hx        Review of Systems   Cardiovascular:  Negative for chest pain, dyspnea on exertion, palpitations and syncope.   Genitourinary: Negative.    Neurological: Negative.      Physical Exam  Vitals reviewed.   Constitutional:       Appearance: She is well-developed.   Neck:      Vascular: No carotid bruit.   Cardiovascular:      Rate and Rhythm: Normal rate and regular rhythm.      Pulses: Intact distal pulses.      Heart sounds: Normal heart sounds. No murmur heard.  Pulmonary:      Breath sounds: Normal breath sounds.   Neurological:      Mental Status: She is oriented to person, place, and time.         Current Outpatient Medications on File Prior to Visit   Medication Sig    albuterol (PROVENTIL/VENTOLIN HFA) 90 mcg/actuation inhaler INHALE 2 PUFFS INTO THE LUNGS EVERY 4 (FOUR) HOURS AS NEEDED FOR WHEEZING OR SHORTNESS OF BREATH. RESCUE    amLODIPine (NORVASC) 10 MG tablet Take 10 mg by mouth once daily.    atorvastatin (LIPITOR) 80 MG tablet Take 1 tablet (80 mg total) by mouth every evening. (Patient taking differently: Take 80 mg by mouth once daily.)    cholecalciferol, vitamin D3, 1,250 mcg (50,000 unit) capsule Take 50,000 Units by mouth every 7 days.    clopidogreL (PLAVIX) 75 mg tablet TAKE 1 TABLET EVERY DAY    ezetimibe (ZETIA) 10 mg tablet Take 10 mg by mouth once daily.    fluticasone-salmeterol diskus inhaler 250-50 mcg Inhale 1 puff into the  lungs 2 (two) times daily. Wash out mouth after use.    furosemide (LASIX) 20 MG tablet Take 1 tablet (20 mg total) by mouth daily as needed (swelling).    lisinopriL-hydrochlorothiazide (PRINZIDE,ZESTORETIC) 20-12.5 mg per tablet Take 1 tablet by mouth 2 (two) times a day.    metFORMIN (GLUCOPHAGE-XR) 500 MG ER 24hr tablet metformin  mg tablet,extended release 24 hr    pantoprazole (PROTONIX) 40 MG tablet Take 40 mg by mouth once daily.    sertraline (ZOLOFT) 100 MG tablet Take 100 mg by mouth once daily.    TRUE METRIX GLUCOSE TEST STRIP Strp SMARTSIG:Via Meter    TRUEPLUS LANCETS 33 gauge Misc     [DISCONTINUED] aspirin (ECOTRIN) 81 MG EC tablet Take 1 tablet (81 mg total) by mouth once daily.    [DISCONTINUED] metoprolol succinate (TOPROL-XL) 25 MG 24 hr tablet TAKE 1 TABLET EVERY DAY     Current Facility-Administered Medications on File Prior to Visit   Medication    chlorhexidine 0.12 % solution 10 mL       Objective:   Objective:  Wt Readings from Last 3 Encounters:   03/19/24 85.7 kg (188 lb 15 oz)   03/02/24 84.1 kg (185 lb 4.8 oz)   09/27/23 83.5 kg (184 lb)     Temp Readings from Last 3 Encounters:   03/02/24 98.4 °F (36.9 °C) (Oral)   08/11/23 97.3 °F (36.3 °C) (Skin)   08/05/23 97.9 °F (36.6 °C) (Oral)     BP Readings from Last 3 Encounters:   03/19/24 138/82   03/02/24 (!) 168/79   08/11/23 (!) 179/89     Pulse Readings from Last 3 Encounters:   03/19/24 78   03/02/24 63   08/11/23 (!) 56           Lab Results   Component Value Date    CHOL 130 05/25/2023    CHOL 193 11/28/2022    CHOL 177 09/01/2022     Lab Results   Component Value Date    HDL 37 (L) 05/25/2023    HDL 34 (L) 11/28/2022    HDL 40 09/01/2022     Lab Results   Component Value Date    LDLCALC 68 05/25/2023    LDLCALC 115 (H) 11/28/2022    LDLCALC 101 (H) 09/01/2022     Lab Results   Component Value Date    TRIG 146 05/25/2023    TRIG 249 (H) 11/28/2022    TRIG 210 (H) 09/01/2022     Lab Results   Component Value Date    CHOLHDL  "21.7 03/13/2021       Chemistry        Component Value Date/Time     03/02/2024 1804    K 3.2 (L) 03/02/2024 1804     03/02/2024 1804    CO2 30 (H) 03/02/2024 1804    BUN 11 03/02/2024 1804    CREATININE 0.8 03/02/2024 1804    GLU 98 03/02/2024 1804        Component Value Date/Time    CALCIUM 9.9 03/02/2024 1804    ALKPHOS 116 03/02/2024 1804    AST 16 03/02/2024 1804    ALT 10 03/02/2024 1804    BILITOT 0.3 03/02/2024 1804    ESTGFRAFRICA >60 03/01/2022 1550    EGFRNONAA >60 03/01/2022 1550          Lab Results   Component Value Date    TSH 2.530 02/16/2022     No results found for: "INR", "PROTIME"  Lab Results   Component Value Date    WBC 16.97 (H) 03/02/2024    HGB 13.0 03/02/2024    HCT 41.0 03/02/2024    MCV 78 (L) 03/02/2024     03/02/2024     BNP  @LABRCNTIP(BNP,BNPTRIAGEBLO)@  CrCl cannot be calculated (Patient's most recent lab result is older than the maximum 7 days allowed.).     Imaging:  ======  Results for orders placed during the hospital encounter of 03/12/21    Echo Color Flow Doppler? Yes    Interpretation Summary  · Concentric hypertrophy and normal systolic function. The estimated ejection fraction is 55%  · Mild tricuspid regurgitation.  · Normal left ventricular diastolic function.  · There are segmental left ventricular wall motion abnormalities.  · With normal right ventricular systolic function.  · Normal central venous pressure (3 mmHg).  · The estimated PA systolic pressure is 21 mmHg.    No results found for this or any previous visit.    No results found for this or any previous visit.    No results found for this or any previous visit.    No results found for this or any previous visit.    No valid procedures specified.    Diagnostic Results:  ECG: Reviewed    3/13/2021   Description of the findings of the procedure:   Left main patent   Lad patent, d1 patent   circ with 30% proximal , om1 intermediate   Ramus is ostially 40%   rca is 100% thrombotic, on top of a " diffusely diseased vessel      Plan   S/p successful PCI, 3.5x28 mm JOSE DAVID , post dilated to 4.0 mid stent   brilinta received 180 mg   Asa   lipitor       The ASCVD Risk score (Mac RAY, et al., 2019) failed to calculate for the following reasons:    The patient has a prior MI or stroke diagnosis    Assessment and Plan:   Morbid obesity    S/P coronary artery stent placement  Comments:  to RCA 3.2021  Orders:  -     metoprolol succinate (TOPROL-XL) 50 MG 24 hr tablet; Take 1 tablet (50 mg total) by mouth once daily.  Dispense: 90 tablet; Refill: 3    S/P coronary artery stent placement  -     metoprolol succinate (TOPROL-XL) 50 MG 24 hr tablet; Take 1 tablet (50 mg total) by mouth once daily.  Dispense: 90 tablet; Refill: 3    Mixed hyperlipidemia    Essential hypertension    Sleep apnea, unspecified type        Statins , asa , Toprol   Increase Toprol.  Blood pressure not at goal.  EKG unchanged from the past.  Old infarct.  History of PCI to her RCA NSTEMI 2021.    Could not tolerate CPAP.    Continue Plavix.  DC aspirin discussed today in clinic.  Reviewed all tests and above medical conditions with patient in detail and formulated treatment plan.  Risk factor modification discussed.   Cardiac low salt diet discussed.  Maintaining healthy weight and weight loss goals were discussed in clinic.        Follow up in 6 months

## 2024-04-11 DIAGNOSIS — J43.9 PULMONARY EMPHYSEMA, UNSPECIFIED EMPHYSEMA TYPE: ICD-10-CM

## 2024-04-11 RX ORDER — FLUTICASONE PROPIONATE AND SALMETEROL 250; 50 UG/1; UG/1
1 POWDER RESPIRATORY (INHALATION) 2 TIMES DAILY
Qty: 180 EACH | Refills: 3 | Status: SHIPPED | OUTPATIENT
Start: 2024-04-11 | End: 2025-04-11

## 2024-04-11 RX ORDER — CLOPIDOGREL BISULFATE 75 MG/1
75 TABLET ORAL
Qty: 90 TABLET | Refills: 3 | Status: SHIPPED | OUTPATIENT
Start: 2024-04-11

## 2024-04-15 ENCOUNTER — TELEPHONE (OUTPATIENT)
Dept: PULMONOLOGY | Facility: CLINIC | Age: 69
End: 2024-04-15
Payer: MEDICARE

## 2024-04-15 DIAGNOSIS — R06.02 SOB (SHORTNESS OF BREATH): Primary | ICD-10-CM

## 2024-04-15 NOTE — TELEPHONE ENCOUNTER
----- Message from Jae Hill sent at 4/15/2024  2:26 PM CDT -----  Contact: [phone]  Patient is calling in regards to getting a pre op clearance. Please call pt back at [phone]. Thanks KB

## 2024-04-19 ENCOUNTER — HOSPITAL ENCOUNTER (EMERGENCY)
Facility: HOSPITAL | Age: 69
Discharge: HOME | End: 2024-04-19
Attending: EMERGENCY MEDICINE
Payer: MEDICARE

## 2024-04-19 VITALS
SYSTOLIC BLOOD PRESSURE: 182 MMHG | OXYGEN SATURATION: 95 % | TEMPERATURE: 100 F | WEIGHT: 182.13 LBS | BODY MASS INDEX: 34.41 KG/M2 | RESPIRATION RATE: 19 BRPM | DIASTOLIC BLOOD PRESSURE: 85 MMHG | HEART RATE: 89 BPM

## 2024-04-19 DIAGNOSIS — J44.1 COPD EXACERBATION: Primary | ICD-10-CM

## 2024-04-19 LAB
ALBUMIN SERPL BCP-MCNC: 3.4 G/DL (ref 3.5–5.2)
ALP SERPL-CCNC: 117 U/L (ref 55–135)
ALT SERPL W/O P-5'-P-CCNC: 10 U/L (ref 10–44)
ANION GAP SERPL CALC-SCNC: 13 MMOL/L (ref 8–16)
AST SERPL-CCNC: 19 U/L (ref 10–40)
BASOPHILS # BLD AUTO: 0.05 K/UL (ref 0–0.2)
BASOPHILS NFR BLD: 0.6 % (ref 0–1.9)
BILIRUB SERPL-MCNC: 0.3 MG/DL (ref 0.1–1)
BNP SERPL-MCNC: 29 PG/ML (ref 0–99)
BUN SERPL-MCNC: 8 MG/DL (ref 8–23)
CALCIUM SERPL-MCNC: 9.4 MG/DL (ref 8.7–10.5)
CHLORIDE SERPL-SCNC: 106 MMOL/L (ref 95–110)
CO2 SERPL-SCNC: 20 MMOL/L (ref 23–29)
CREAT SERPL-MCNC: 0.8 MG/DL (ref 0.5–1.4)
DIFFERENTIAL METHOD BLD: ABNORMAL
EOSINOPHIL # BLD AUTO: 0.2 K/UL (ref 0–0.5)
EOSINOPHIL NFR BLD: 2.8 % (ref 0–8)
ERYTHROCYTE [DISTWIDTH] IN BLOOD BY AUTOMATED COUNT: 14.6 % (ref 11.5–14.5)
EST. GFR  (NO RACE VARIABLE): >60 ML/MIN/1.73 M^2
GLUCOSE SERPL-MCNC: 116 MG/DL (ref 70–110)
HCT VFR BLD AUTO: 43.4 % (ref 37–48.5)
HGB BLD-MCNC: 13.9 G/DL (ref 12–16)
IMM GRANULOCYTES # BLD AUTO: 0.03 K/UL (ref 0–0.04)
IMM GRANULOCYTES NFR BLD AUTO: 0.4 % (ref 0–0.5)
LYMPHOCYTES # BLD AUTO: 1.9 K/UL (ref 1–4.8)
LYMPHOCYTES NFR BLD: 24.4 % (ref 18–48)
MCH RBC QN AUTO: 25 PG (ref 27–31)
MCHC RBC AUTO-ENTMCNC: 32 G/DL (ref 32–36)
MCV RBC AUTO: 78 FL (ref 82–98)
MONOCYTES # BLD AUTO: 0.9 K/UL (ref 0.3–1)
MONOCYTES NFR BLD: 11.7 % (ref 4–15)
NEUTROPHILS # BLD AUTO: 4.7 K/UL (ref 1.8–7.7)
NEUTROPHILS NFR BLD: 60.1 % (ref 38–73)
NRBC BLD-RTO: 0 /100 WBC
PLATELET # BLD AUTO: 230 K/UL (ref 150–450)
PMV BLD AUTO: 9.8 FL (ref 9.2–12.9)
POTASSIUM SERPL-SCNC: 3.4 MMOL/L (ref 3.5–5.1)
PROT SERPL-MCNC: 7.4 G/DL (ref 6–8.4)
RBC # BLD AUTO: 5.55 M/UL (ref 4–5.4)
SODIUM SERPL-SCNC: 139 MMOL/L (ref 136–145)
TROPONIN I SERPL DL<=0.01 NG/ML-MCNC: 0.02 NG/ML (ref 0–0.03)
WBC # BLD AUTO: 7.79 K/UL (ref 3.9–12.7)

## 2024-04-19 PROCEDURE — 99285 EMERGENCY DEPT VISIT HI MDM: CPT | Mod: 25

## 2024-04-19 PROCEDURE — 93005 ELECTROCARDIOGRAM TRACING: CPT

## 2024-04-19 PROCEDURE — 83880 ASSAY OF NATRIURETIC PEPTIDE: CPT | Performed by: NURSE PRACTITIONER

## 2024-04-19 PROCEDURE — 96374 THER/PROPH/DIAG INJ IV PUSH: CPT

## 2024-04-19 PROCEDURE — 80053 COMPREHEN METABOLIC PANEL: CPT | Performed by: EMERGENCY MEDICINE

## 2024-04-19 PROCEDURE — 94640 AIRWAY INHALATION TREATMENT: CPT | Mod: XB

## 2024-04-19 PROCEDURE — 96375 TX/PRO/DX INJ NEW DRUG ADDON: CPT

## 2024-04-19 PROCEDURE — 93010 ELECTROCARDIOGRAM REPORT: CPT | Mod: ,,, | Performed by: INTERNAL MEDICINE

## 2024-04-19 PROCEDURE — 85025 COMPLETE CBC W/AUTO DIFF WBC: CPT | Performed by: NURSE PRACTITIONER

## 2024-04-19 PROCEDURE — 84484 ASSAY OF TROPONIN QUANT: CPT | Performed by: EMERGENCY MEDICINE

## 2024-04-19 PROCEDURE — 25000003 PHARM REV CODE 250: Performed by: EMERGENCY MEDICINE

## 2024-04-19 PROCEDURE — 63600175 PHARM REV CODE 636 W HCPCS: Performed by: EMERGENCY MEDICINE

## 2024-04-19 PROCEDURE — 25000242 PHARM REV CODE 250 ALT 637 W/ HCPCS: Performed by: EMERGENCY MEDICINE

## 2024-04-19 RX ORDER — DOXYCYCLINE 100 MG/1
100 CAPSULE ORAL 2 TIMES DAILY
Qty: 10 CAPSULE | Refills: 0 | Status: SHIPPED | OUTPATIENT
Start: 2024-04-19 | End: 2024-04-19 | Stop reason: ALTCHOICE

## 2024-04-19 RX ORDER — DOXYCYCLINE HYCLATE 100 MG
100 TABLET ORAL
Status: COMPLETED | OUTPATIENT
Start: 2024-04-19 | End: 2024-04-19

## 2024-04-19 RX ORDER — IPRATROPIUM BROMIDE AND ALBUTEROL SULFATE 2.5; .5 MG/3ML; MG/3ML
3 SOLUTION RESPIRATORY (INHALATION)
Status: COMPLETED | OUTPATIENT
Start: 2024-04-19 | End: 2024-04-19

## 2024-04-19 RX ORDER — DOXYCYCLINE 100 MG/1
100 CAPSULE ORAL 2 TIMES DAILY
Qty: 10 CAPSULE | Refills: 0 | Status: SHIPPED | OUTPATIENT
Start: 2024-04-19

## 2024-04-19 RX ORDER — PREDNISONE 20 MG/1
40 TABLET ORAL DAILY
Qty: 10 TABLET | Refills: 0 | Status: SHIPPED | OUTPATIENT
Start: 2024-04-20 | End: 2024-04-25

## 2024-04-19 RX ORDER — PREDNISONE 20 MG/1
40 TABLET ORAL DAILY
Qty: 10 TABLET | Refills: 0 | Status: SHIPPED | OUTPATIENT
Start: 2024-04-20 | End: 2024-04-19 | Stop reason: ALTCHOICE

## 2024-04-19 RX ORDER — LABETALOL HYDROCHLORIDE 5 MG/ML
20 INJECTION, SOLUTION INTRAVENOUS
Status: COMPLETED | OUTPATIENT
Start: 2024-04-19 | End: 2024-04-19

## 2024-04-19 RX ADMIN — IPRATROPIUM BROMIDE AND ALBUTEROL SULFATE 3 ML: 2.5; .5 SOLUTION RESPIRATORY (INHALATION) at 06:04

## 2024-04-19 RX ADMIN — LABETALOL HYDROCHLORIDE 20 MG: 5 INJECTION INTRAVENOUS at 08:04

## 2024-04-19 RX ADMIN — METHYLPREDNISOLONE SODIUM SUCCINATE 80 MG: 40 INJECTION, POWDER, FOR SOLUTION INTRAMUSCULAR; INTRAVENOUS at 06:04

## 2024-04-19 RX ADMIN — DOXYCYCLINE HYCLATE 100 MG: 100 TABLET, COATED ORAL at 09:04

## 2024-04-19 NOTE — FIRST PROVIDER EVALUATION
Medical screening examination initiated.  I have conducted a focused provider triage encounter, findings are as follows:    Brief history of present illness:  68-year-old female presents emergency room with a 3 day history of chest pain and shortness of breath.    Vitals:    04/19/24 1723   BP: (!) 191/84  Comment: pt states she took her BP meds this morning   BP Location: Right forearm   Patient Position: Sitting   Pulse: 97   Resp: (!) 26   Temp: 99.9 °F (37.7 °C)   TempSrc: Oral   SpO2: 98%   Weight: 82.6 kg (182 lb 1.6 oz)       Pertinent physical exam:  Moderate distress elevated blood pressure speaking in full sentences    Brief workup plan:  Workup    Preliminary workup initiated; this workup will be continued and followed by the physician or advanced practice provider that is assigned to the patient when roomed.

## 2024-04-19 NOTE — ED PROVIDER NOTES
SCRIBE #1 NOTE: I, Fredis Samuels, am scribing for, and in the presence of, Graeme Gale DO. I have scribed the entire note.      History      Chief Complaint   Patient presents with    Shortness of Breath     Pt with COPD c/o SOB x 3 days and chest pressure.  Pt also c/o nausea.       Review of patient's allergies indicates:  No Known Allergies     HPI   HPI    4/19/2024, 5:57 PM   History obtained from the patient      History of Present Illness: Jacqueline Luna is a 68 y.o. female patient with a PMHx of COPD, HTN who presents to the Emergency Department for SOB, onset 3 days PTA. Symptoms are constant and moderate in severity. No mitigating or exacerbating factors reported. Associated sxs include cough, chest pressure, and anxiety. Patient denies any fever, chills, n/v/d, and all other sxs at this time. No prior Tx reported. No further complaints or concerns at this time.     Arrival mode: Personal vehicle     PCP: Alayna Greenwood PA       Past Medical History:  Past Medical History:   Diagnosis Date    Anxiety disorder, unspecified     COPD (chronic obstructive pulmonary disease)     Depression     Family history of malignant neoplasm of breast 8/8/2023    Fibrocystic breast     HTN (hypertension)     Hypercholesteremia     Mass of multiple sites of left breast 08/07/2023    Mass of multiple sites of right breast 08/07/2023    Mastodynia 8/8/2023    Mild intermittent asthma, uncomplicated     Type 2 diabetes mellitus without complications        Past Surgical History:  Past Surgical History:   Procedure Laterality Date    CLOSED REDUCTION, FINGER, WITH PINNING Right 8/11/2023    Procedure: CLOSED REDUCTION, FINGER, WITH PINNING;  Surgeon: Nawaf Mcclure MD;  Location: [facility] OR;  Service: Orthopedics;  Laterality: Right;  closed reduction and pinning right thumb proximal phalanx fracture    CORONARY ANGIOGRAPHY N/A 3/12/2021    Procedure: ANGIOGRAM, CORONARY ARTERY;  Surgeon: Shruti  MD Hunter;  Location: [facility] CATH LAB;  Service: Cardiology;  Laterality: N/A;    CORONARY ANGIOPLASTY WITH STENT PLACEMENT N/A 3/12/2021    Procedure: Angioplasty, Coronary Artery, With Stent Insertion;  Surgeon: Shruti Harrison MD;  Location: [facility] CATH LAB;  Service: Cardiology;  Laterality: N/A;    LEFT HEART CATHETERIZATION Left 3/12/2021    Procedure: CATHETERIZATION, HEART, LEFT;  Surgeon: Shruti Harrison MD;  Location: [facility] CATH LAB;  Service: Cardiology;  Laterality: Left;         Family History:  Family History   Problem Relation Name Age of Onset    Diabetes Mother      Hypertension Mother      Cancer Mother      Cancer Father      COPD Father      Breast cancer Sister          dx 40's    Breast cancer Sister  74    Breast cancer Maternal Aunt      Breast cancer Maternal Aunt      Ovarian cancer Neg Hx         Social History:  Social History     Tobacco Use    Smoking status: Former     Current packs/day: 0.00     Average packs/day: 0.5 packs/day for 35.2 years (17.6 ttl pk-yrs)     Types: Cigarettes     Start date: 1/1/1986     Quit date: 3/5/2021     Years since quitting: 3.1    Smokeless tobacco: Not on file   Substance and Sexual Activity    Alcohol use: Not Currently    Drug use: Never    Sexual activity: Not Currently     Birth control/protection: Abstinence       ROS   Review of Systems   Constitutional:  Negative for chills and fever.   Respiratory:  Positive for cough and shortness of breath.    Cardiovascular:  Positive for chest pain (pressure).   Gastrointestinal:  Negative for diarrhea, nausea and vomiting.   Psychiatric/Behavioral:  The patient is nervous/anxious.      Physical Exam      Initial Vitals [04/19/24 1723]   BP Pulse Resp Temp SpO2   (!) 191/84 97 (!) 26 99.9 °F (37.7 °C) 98 %      MAP       --          Physical Exam  Vitals reviewed.   Constitutional:       General: She is in acute distress.   Cardiovascular:      Rate and Rhythm: Normal rate and regular rhythm.      Heart  sounds: No murmur heard.  Pulmonary:      Comments: Expiratory wheezes noted in the bilateral bases  Abdominal:      Palpations: Abdomen is soft.      Tenderness: There is no abdominal tenderness.   Musculoskeletal:         General: Normal range of motion.   Skin:     General: Skin is warm and dry.      Findings: No rash.   Neurological:      General: No focal deficit present.      Mental Status: She is alert and oriented to person, place, and time.           ED Course    Critical Care    Date/Time: 4/19/2024 8:30 PM    Performed by: Graeme Gale DO  Authorized by: Graeme Gale DO  Direct patient critical care time: 20 minutes  Additional history critical care time: 5 minutes  Ordering / reviewing critical care time: 5 minutes  Documentation critical care time: 5 minutes  Total critical care time (exclusive of procedural time) : 35 minutes  Critical care time was exclusive of separately billable procedures and treating other patients.  Critical care was necessary to treat or prevent imminent or life-threatening deterioration of the following conditions: respiratory failure.  Critical care was time spent personally by me on the following activities: evaluation of patient's response to treatment, examination of patient, ordering and review of laboratory studies, ordering and review of radiographic studies, pulse oximetry, re-evaluation of patient's condition and review of old charts.        ED Vital Signs:  Vitals:    04/19/24 1723 04/19/24 1747 04/19/24 1802 04/19/24 1803   BP: (!) 191/84 (!) 219/100 (!) 250/114    Pulse: 97 89 91 96   Resp: (!) 26 (!) 27 (!) 41 18   Temp: 99.9 °F (37.7 °C)      TempSrc: Oral      SpO2: 98% 98% 98% 98%   Weight: 82.6 kg (182 lb 1.6 oz)       04/19/24 1805 04/19/24 1807 04/19/24 1823 04/19/24 1926   BP:   (!) 216/94 (!) 162/70   Pulse: 96 96 102 105   Resp: 18 18 (!) 30 20   Temp:       TempSrc:       SpO2: 98% 98% 96% 95%   Weight:        04/19/24 1930 04/19/24  1945 04/19/24 2000 04/19/24 2043   BP:  (!) 200/102  (!) 197/86   Pulse: 107 106 98    Resp: (!) 22 (!) 22 (!) 21    Temp:       TempSrc:       SpO2: 95% (!) 94% (!) 94%    Weight:        04/19/24 2115 04/19/24 2122   BP: (!) 185/84 (!) 182/85   Pulse: 89 89   Resp: 20 19   Temp:     TempSrc:     SpO2: (!) 94% 95%   Weight:         Abnormal Lab Results:  Labs Reviewed   CBC W/ AUTO DIFFERENTIAL - Abnormal; Notable for the following components:       Result Value    RBC 5.55 (*)     MCV 78 (*)     MCH 25.0 (*)     RDW 14.6 (*)     All other components within normal limits   COMPREHENSIVE METABOLIC PANEL - Abnormal; Notable for the following components:    Potassium 3.4 (*)     CO2 20 (*)     Glucose 116 (*)     Albumin 3.4 (*)     All other components within normal limits   B-TYPE NATRIURETIC PEPTIDE   TROPONIN I        All Lab Results:  Results for orders placed or performed during the hospital encounter of 04/19/24   CBC auto differential   Result Value Ref Range    WBC 7.79 3.90 - 12.70 K/uL    RBC 5.55 (H) 4.00 - 5.40 M/uL    Hemoglobin 13.9 12.0 - 16.0 g/dL    Hematocrit 43.4 37.0 - 48.5 %    MCV 78 (L) 82 - 98 fL    MCH 25.0 (L) 27.0 - 31.0 pg    MCHC 32.0 32.0 - 36.0 g/dL    RDW 14.6 (H) 11.5 - 14.5 %    Platelets 230 150 - 450 K/uL    MPV 9.8 9.2 - 12.9 fL    Immature Granulocytes 0.4 0.0 - 0.5 %    Gran # (ANC) 4.7 1.8 - 7.7 K/uL    Immature Grans (Abs) 0.03 0.00 - 0.04 K/uL    Lymph # 1.9 1.0 - 4.8 K/uL    Mono # 0.9 0.3 - 1.0 K/uL    Eos # 0.2 0.0 - 0.5 K/uL    Baso # 0.05 0.00 - 0.20 K/uL    nRBC 0 0 /100 WBC    Gran % 60.1 38.0 - 73.0 %    Lymph % 24.4 18.0 - 48.0 %    Mono % 11.7 4.0 - 15.0 %    Eosinophil % 2.8 0.0 - 8.0 %    Basophil % 0.6 0.0 - 1.9 %    Differential Method Automated    Brain natriuretic peptide   Result Value Ref Range    BNP 29 0 - 99 pg/mL   Comprehensive metabolic panel   Result Value Ref Range    Sodium 139 136 - 145 mmol/L    Potassium 3.4 (L) 3.5 - 5.1 mmol/L    Chloride 106 95  - 110 mmol/L    CO2 20 (L) 23 - 29 mmol/L    Glucose 116 (H) 70 - 110 mg/dL    BUN 8 8 - 23 mg/dL    Creatinine 0.8 0.5 - 1.4 mg/dL    Calcium 9.4 8.7 - 10.5 mg/dL    Total Protein 7.4 6.0 - 8.4 g/dL    Albumin 3.4 (L) 3.5 - 5.2 g/dL    Total Bilirubin 0.3 0.1 - 1.0 mg/dL    Alkaline Phosphatase 117 55 - 135 U/L    AST 19 10 - 40 U/L    ALT 10 10 - 44 U/L    eGFR >60 >60 mL/min/1.73 m^2    Anion Gap 13 8 - 16 mmol/L   Troponin I   Result Value Ref Range    Troponin I 0.021 0.000 - 0.026 ng/mL   EKG 12-lead   Result Value Ref Range    QRS Duration 76 ms    OHS QTC Calculation 434 ms     Imaging Results:  Imaging Results              X-Ray Chest AP Portable (Final result)  Result time 04/19/24 18:42:13      Final result by Mary Ivan MD (04/19/24 18:42:13)                   Impression:      No active or adverse finding      Electronically signed by: Mary Ivan  Date:    04/19/2024  Time:    18:42               Narrative:    EXAMINATION:  XR CHEST AP PORTABLE    CLINICAL HISTORY:  CHF;    TECHNIQUE:  Single frontal portable view of the chest was performed.    COMPARISON:  08/09/2023    FINDINGS:  No pulmonary consolidation pleural effusion or convincing pneumothorax as visualized under penetrated portable exam                                                The Emergency Provider reviewed the vital signs and test results, which are outlined above.    ED Discussion     7:59 PM: Reassessed pt at this time. Discussed with pt all pertinent ED information and results. Discussed pt dx and plan of tx. Gave pt all f/u and return to the ED instructions. All questions and concerns were addressed at this time. Pt expresses understanding of information and instructions, and is comfortable with plan to discharge. Pt is stable for discharge.    I discussed with patient and/or family/caretaker that evaluation in the ED does not suggest any emergent or life threatening medical conditions requiring immediate  intervention beyond what was provided in the ED, and I believe patient is safe for discharge.  Regardless, an unremarkable evaluation in the ED does not preclude the development or presence of a serious of life threatening condition. As such, patient was instructed to return immediately for any worsening or change in current symptoms.      ED Course as of 04/22/24 0748   Fri Apr 19, 2024 1916 X-Ray Chest AP Portable  No acute findings [CD]   1952 Comprehensive metabolic panel(!)  Nonspecific findings [CD]   1952 CBC auto differential(!)  Nonspecific findings [CD]   1952 Brain natriuretic peptide  Within normal limits [CD]   2020 Troponin I  wnl [CD]   2021 Amb RA pulse ox was 94% [CD]      ED Course User Index  [CD] Graeme Gale, DO       ED Medication(s):  Medications   albuterol-ipratropium 2.5 mg-0.5 mg/3 mL nebulizer solution 3 mL (3 mLs Nebulization Given 4/19/24 1807)   methylPREDNISolone sodium succinate injection 80 mg (80 mg Intravenous Given 4/19/24 1817)   labetaloL injection 20 mg (20 mg Intravenous Given 4/19/24 2043)   doxycycline tablet 100 mg (100 mg Oral Given 4/19/24 2109)       Discharge Medication List as of 4/19/2024  8:50 PM        START taking these medications    Details   doxycycline (VIBRAMYCIN) 100 MG Cap Take 1 capsule (100 mg total) by mouth 2 (two) times daily., Starting Fri 4/19/2024, Normal      predniSONE (DELTASONE) 20 MG tablet Take 2 tablets (40 mg total) by mouth once daily. for 5 days, Starting Sat 4/20/2024, Until Thu 4/25/2024, Normal               Medical Decision Making    Medical Decision Making  Symptoms improved with DuoNebs and IV steroids.  On room air ambulatory pulse ox the patient's oxygen saturation was 94% with no indication of tachypnea or increased work of breathing.  We will discharge with short burst of prednisone.  Patient states that she has a good supply of both nebulizer treatments and albuterol rescue inhalers.  Instructed to return immediately  for any new or worsening symptoms and she verbalized understanding.    Amount and/or Complexity of Data Reviewed  Labs: ordered. Decision-making details documented in ED Course.  Radiology: ordered. Decision-making details documented in ED Course.  ECG/medicine tests: ordered and independent interpretation performed. Decision-making details documented in ED Course.    Risk  Prescription drug management.  Risk Details: Differential diagnosis includes but is not limited to:  ACS, heart failure, COPD exacerbation, pneumonia, electrolyte abnormality,                Scribe Attestation:   Scribe #1: I performed the above scribed service and the documentation accurately describes the services I performed. I attest to the accuracy of the note.    Attending:   Physician Attestation Statement for Scribe #1: I, Graeme Gale DO, personally performed the services described in this documentation, as scribed by Fredis Samuels, in my presence, and it is both accurate and complete.          Clinical Impression       ICD-10-CM ICD-9-CM   1. COPD exacerbation  J44.1 491.21       Disposition:   Disposition: Discharged  Condition: Stable         Graeme Gale DO  04/22/24 0749

## 2024-04-20 NOTE — DISCHARGE INSTRUCTIONS
1.) Take your blood pressure twice daily using instructions provided and write these numbers down on a sheet of paper.  Take this sheet of paper with you when you go to your recheck appointment with your primary care provider.

## 2024-04-21 LAB
OHS QRS DURATION: 76 MS
OHS QTC CALCULATION: 434 MS

## 2024-04-23 ENCOUNTER — TELEPHONE (OUTPATIENT)
Dept: CARDIOLOGY | Facility: CLINIC | Age: 69
End: 2024-04-23
Payer: MEDICARE

## 2024-04-23 NOTE — TELEPHONE ENCOUNTER
Paperwork faxed.                 ----- Message from To Snyder sent at 4/23/2024 11:58 AM CDT -----  Contact: [phone]  Calling requesting pt EKG/labs/Clinic notes ([id number]). Please call back at [phone] . Thanksdj

## 2024-04-29 ENCOUNTER — HOSPITAL ENCOUNTER (EMERGENCY)
Facility: HOSPITAL | Age: 69
Discharge: HOME | End: 2024-04-29
Attending: EMERGENCY MEDICINE
Payer: MEDICARE

## 2024-04-29 VITALS
OXYGEN SATURATION: 95 % | DIASTOLIC BLOOD PRESSURE: 60 MMHG | TEMPERATURE: 99 F | RESPIRATION RATE: 18 BRPM | WEIGHT: 203.25 LBS | SYSTOLIC BLOOD PRESSURE: 132 MMHG | BODY MASS INDEX: 38.41 KG/M2 | HEART RATE: 60 BPM

## 2024-04-29 DIAGNOSIS — R11.0 NAUSEA: Primary | ICD-10-CM

## 2024-04-29 DIAGNOSIS — N39.0 ACUTE LOWER UTI: ICD-10-CM

## 2024-04-29 DIAGNOSIS — R42 DIZZINESS: ICD-10-CM

## 2024-04-29 LAB
ALBUMIN SERPL BCP-MCNC: 3.5 G/DL (ref 3.5–5.2)
ALP SERPL-CCNC: 113 U/L (ref 55–135)
ALT SERPL W/O P-5'-P-CCNC: 12 U/L (ref 10–44)
ANION GAP SERPL CALC-SCNC: 11 MMOL/L (ref 8–16)
AST SERPL-CCNC: 16 U/L (ref 10–40)
BASOPHILS # BLD AUTO: 0.08 K/UL (ref 0–0.2)
BASOPHILS NFR BLD: 0.4 % (ref 0–1.9)
BILIRUB SERPL-MCNC: 0.5 MG/DL (ref 0.1–1)
BILIRUB UR QL STRIP: NEGATIVE
BNP SERPL-MCNC: <10 PG/ML (ref 0–99)
BUN SERPL-MCNC: 29 MG/DL (ref 8–23)
CALCIUM SERPL-MCNC: 9.9 MG/DL (ref 8.7–10.5)
CHLORIDE SERPL-SCNC: 102 MMOL/L (ref 95–110)
CLARITY UR: ABNORMAL
CO2 SERPL-SCNC: 25 MMOL/L (ref 23–29)
COLOR UR: YELLOW
CREAT SERPL-MCNC: 1.4 MG/DL (ref 0.5–1.4)
DIFFERENTIAL METHOD BLD: ABNORMAL
EOSINOPHIL # BLD AUTO: 0.2 K/UL (ref 0–0.5)
EOSINOPHIL NFR BLD: 0.9 % (ref 0–8)
ERYTHROCYTE [DISTWIDTH] IN BLOOD BY AUTOMATED COUNT: 14.6 % (ref 11.5–14.5)
EST. GFR  (NO RACE VARIABLE): 41 ML/MIN/1.73 M^2
GLUCOSE SERPL-MCNC: 129 MG/DL (ref 70–110)
GLUCOSE UR QL STRIP: NEGATIVE
HCT VFR BLD AUTO: 43.9 % (ref 37–48.5)
HGB BLD-MCNC: 13.7 G/DL (ref 12–16)
HGB UR QL STRIP: NEGATIVE
HYALINE CASTS #/AREA URNS LPF: 64 /LPF
IMM GRANULOCYTES # BLD AUTO: 0.16 K/UL (ref 0–0.04)
IMM GRANULOCYTES NFR BLD AUTO: 0.8 % (ref 0–0.5)
KETONES UR QL STRIP: NEGATIVE
LEUKOCYTE ESTERASE UR QL STRIP: ABNORMAL
LYMPHOCYTES # BLD AUTO: 5.5 K/UL (ref 1–4.8)
LYMPHOCYTES NFR BLD: 27.6 % (ref 18–48)
MCH RBC QN AUTO: 24.5 PG (ref 27–31)
MCHC RBC AUTO-ENTMCNC: 31.2 G/DL (ref 32–36)
MCV RBC AUTO: 79 FL (ref 82–98)
MICROSCOPIC COMMENT: ABNORMAL
MONOCYTES # BLD AUTO: 1.1 K/UL (ref 0.3–1)
MONOCYTES NFR BLD: 5.6 % (ref 4–15)
NEUTROPHILS # BLD AUTO: 12.9 K/UL (ref 1.8–7.7)
NEUTROPHILS NFR BLD: 64.7 % (ref 38–73)
NITRITE UR QL STRIP: NEGATIVE
NRBC BLD-RTO: 0 /100 WBC
PH UR STRIP: 5 [PH] (ref 5–8)
PLATELET # BLD AUTO: 383 K/UL (ref 150–450)
PMV BLD AUTO: 9.7 FL (ref 9.2–12.9)
POTASSIUM SERPL-SCNC: 3.1 MMOL/L (ref 3.5–5.1)
PROT SERPL-MCNC: 7.2 G/DL (ref 6–8.4)
PROT UR QL STRIP: ABNORMAL
RBC # BLD AUTO: 5.59 M/UL (ref 4–5.4)
RBC #/AREA URNS HPF: 7 /HPF (ref 0–4)
SODIUM SERPL-SCNC: 138 MMOL/L (ref 136–145)
SP GR UR STRIP: 1.01 (ref 1–1.03)
SQUAMOUS #/AREA URNS HPF: 36 /HPF
TROPONIN I SERPL DL<=0.01 NG/ML-MCNC: 0.01 NG/ML (ref 0–0.03)
TROPONIN I SERPL DL<=0.01 NG/ML-MCNC: 0.01 NG/ML (ref 0–0.03)
URN SPEC COLLECT METH UR: ABNORMAL
UROBILINOGEN UR STRIP-ACNC: NEGATIVE EU/DL
WBC # BLD AUTO: 19.93 K/UL (ref 3.9–12.7)
WBC #/AREA URNS HPF: 19 /HPF (ref 0–5)

## 2024-04-29 PROCEDURE — 87086 URINE CULTURE/COLONY COUNT: CPT | Performed by: EMERGENCY MEDICINE

## 2024-04-29 PROCEDURE — 25500020 PHARM REV CODE 255: Performed by: EMERGENCY MEDICINE

## 2024-04-29 PROCEDURE — 81000 URINALYSIS NONAUTO W/SCOPE: CPT | Performed by: EMERGENCY MEDICINE

## 2024-04-29 PROCEDURE — 96375 TX/PRO/DX INJ NEW DRUG ADDON: CPT

## 2024-04-29 PROCEDURE — 99285 EMERGENCY DEPT VISIT HI MDM: CPT | Mod: 25

## 2024-04-29 PROCEDURE — 93010 ELECTROCARDIOGRAM REPORT: CPT | Mod: ,,, | Performed by: INTERNAL MEDICINE

## 2024-04-29 PROCEDURE — 80053 COMPREHEN METABOLIC PANEL: CPT | Performed by: EMERGENCY MEDICINE

## 2024-04-29 PROCEDURE — 63600175 PHARM REV CODE 636 W HCPCS: Performed by: EMERGENCY MEDICINE

## 2024-04-29 PROCEDURE — 25000003 PHARM REV CODE 250: Performed by: EMERGENCY MEDICINE

## 2024-04-29 PROCEDURE — 93005 ELECTROCARDIOGRAM TRACING: CPT

## 2024-04-29 PROCEDURE — 96365 THER/PROPH/DIAG IV INF INIT: CPT

## 2024-04-29 PROCEDURE — 85025 COMPLETE CBC W/AUTO DIFF WBC: CPT | Performed by: EMERGENCY MEDICINE

## 2024-04-29 PROCEDURE — 96361 HYDRATE IV INFUSION ADD-ON: CPT

## 2024-04-29 PROCEDURE — 83880 ASSAY OF NATRIURETIC PEPTIDE: CPT | Performed by: EMERGENCY MEDICINE

## 2024-04-29 PROCEDURE — 84484 ASSAY OF TROPONIN QUANT: CPT | Performed by: EMERGENCY MEDICINE

## 2024-04-29 RX ORDER — POTASSIUM CHLORIDE 20 MEQ/1
40 TABLET, EXTENDED RELEASE ORAL
Status: COMPLETED | OUTPATIENT
Start: 2024-04-29 | End: 2024-04-29

## 2024-04-29 RX ORDER — CEPHALEXIN 500 MG/1
500 CAPSULE ORAL 4 TIMES DAILY
Qty: 28 CAPSULE | Refills: 0 | Status: SHIPPED | OUTPATIENT
Start: 2024-04-29 | End: 2024-05-06

## 2024-04-29 RX ORDER — ONDANSETRON HYDROCHLORIDE 2 MG/ML
4 INJECTION, SOLUTION INTRAVENOUS
Status: COMPLETED | OUTPATIENT
Start: 2024-04-29 | End: 2024-04-29

## 2024-04-29 RX ORDER — ONDANSETRON 4 MG/1
4 TABLET, FILM COATED ORAL EVERY 6 HOURS PRN
Qty: 12 TABLET | Refills: 0 | Status: SHIPPED | OUTPATIENT
Start: 2024-04-29

## 2024-04-29 RX ADMIN — SODIUM CHLORIDE 1000 ML: 9 INJECTION, SOLUTION INTRAVENOUS at 06:04

## 2024-04-29 RX ADMIN — IOHEXOL 100 ML: 350 INJECTION, SOLUTION INTRAVENOUS at 07:04

## 2024-04-29 RX ADMIN — CEFTRIAXONE 1 G: 1 INJECTION, POWDER, FOR SOLUTION INTRAMUSCULAR; INTRAVENOUS at 09:04

## 2024-04-29 RX ADMIN — ONDANSETRON 4 MG: 2 INJECTION INTRAMUSCULAR; INTRAVENOUS at 06:04

## 2024-04-29 RX ADMIN — POTASSIUM CHLORIDE 40 MEQ: 1500 TABLET, EXTENDED RELEASE ORAL at 07:04

## 2024-04-29 NOTE — ED PROVIDER NOTES
"Emergency Medicine Provider Note - 4/29/2024    SCRIBE NOTE: I, Mira Tee, am scribing for, and in the presence of, Maria Antonia Merritt DO.    SCRIBE #2 NOTE: I, Mechelle Krueger, am scribing for, and in the presence of,  Zane Amaya MD. I have scribed the remaining portions of the note not scribed by Scribe #1.      History     Chief Complaint   Patient presents with    Nausea     Pt brought in by EMS for weakness and nausea x 3 days; denies vomiting and chest pain       Allergies:  Review of patient's allergies indicates:  No Known Allergies     History of Present Illness   HPI    4/29/2024, 5:49 PM  The history is provided by the patient    Jacqueline Luna is a 68 y.o. female with a PMHx of HTN, hypercholesteremia, DM, COPD presenting to the ED for nausea which onset 3 days PTA in the ED. The pt states that she went to her PCP today for a hysterectomy pre-op, but when she arrived could not get out of the vehicle she "felt like she was going to pass out". The pt states that her BP was low while in the office. Pt's also states that she began experiencing dizziness, light-headedness, and tremors. Pt also complains of a worsening cough. Symptoms are constant and moderate in severity. No mitigating or exacerbating factors reported. Patient denies any fever, chills, v/d, headaches, SOB, congestion, and all other sxs at this time. Pt states that she is compliant with her home medication regiment. She mentions that she takes lisinopril, metroprolol, and amlodipine for management of her HTN, but was taken off of lisinopril and amlodipine by her PCP today. No further complaints or concerns at this time.        Arrival mode: [facility]     PCP: Alayna Greenwood PA     Past Medical History:  Past Medical History:   Diagnosis Date    Anxiety disorder, unspecified     COPD (chronic obstructive pulmonary disease)     Depression     Family history of malignant neoplasm of breast 8/8/2023    " Fibrocystic breast     HTN (hypertension)     Hypercholesteremia     Mass of multiple sites of left breast 08/07/2023    Mass of multiple sites of right breast 08/07/2023    Mastodynia 8/8/2023    Mild intermittent asthma, uncomplicated     Type 2 diabetes mellitus without complications        Past Surgical History:  Past Surgical History:   Procedure Laterality Date    CLOSED REDUCTION, FINGER, WITH PINNING Right 8/11/2023    Procedure: CLOSED REDUCTION, FINGER, WITH PINNING;  Surgeon: Nawaf Mcclure MD;  Location: [facility] OR;  Service: Orthopedics;  Laterality: Right;  closed reduction and pinning right thumb proximal phalanx fracture    CORONARY ANGIOGRAPHY N/A 3/12/2021    Procedure: ANGIOGRAM, CORONARY ARTERY;  Surgeon: Shruti Harrison MD;  Location: [facility] CATH LAB;  Service: Cardiology;  Laterality: N/A;    CORONARY ANGIOPLASTY WITH STENT PLACEMENT N/A 3/12/2021    Procedure: Angioplasty, Coronary Artery, With Stent Insertion;  Surgeon: Shruti Harrison MD;  Location: [facility] CATH LAB;  Service: Cardiology;  Laterality: N/A;    LEFT HEART CATHETERIZATION Left 3/12/2021    Procedure: CATHETERIZATION, HEART, LEFT;  Surgeon: Shruti Harrison MD;  Location: [facility] CATH LAB;  Service: Cardiology;  Laterality: Left;         Family History:  Family History   Problem Relation Name Age of Onset    Diabetes Mother      Hypertension Mother      Cancer Mother      Cancer Father      COPD Father      Breast cancer Sister          dx 40's    Breast cancer Sister  74    Breast cancer Maternal Aunt      Breast cancer Maternal Aunt      Ovarian cancer Neg Hx         Social History:  Social History     Tobacco Use    Smoking status: Former     Current packs/day: 0.00     Average packs/day: 0.5 packs/day for 35.2 years (17.6 ttl pk-yrs)     Types: Cigarettes     Start date: 1/1/1986     Quit date: 3/5/2021     Years since quitting: 3.1    Smokeless tobacco: Not on file   Substance and Sexual Activity    Alcohol use: Not  Currently    Drug use: Never    Sexual activity: Not Currently     Birth control/protection: Abstinence        Review of Systems   Review of Systems   Constitutional:  Negative for chills and fever.   HENT:  Negative for congestion and sore throat.    Respiratory:  Positive for cough. Negative for shortness of breath.    Cardiovascular:  Negative for chest pain.   Gastrointestinal:  Positive for nausea. Negative for diarrhea and vomiting.   Genitourinary:  Negative for dysuria.   Musculoskeletal:  Negative for back pain.   Skin:  Negative for rash.   Neurological:  Positive for dizziness, tremors, weakness (generalized) and light-headedness. Negative for headaches.   Hematological:  Does not bruise/bleed easily.   All other systems reviewed and are negative.     Physical Exam     Initial Vitals   BP Pulse Resp Temp SpO2   04/29/24 1506 04/29/24 1506 04/29/24 1506 04/29/24 1507 04/29/24 1506   (!) 108/50 70 18 98.7 °F (37.1 °C) 96 %      MAP       --                 Physical Exam    Nursing Notes and Vital Signs Reviewed.  Constitutional: Patient is in no acute distress. Well-developed and well-nourished.  Head: Atraumatic. Normocephalic.  Eyes: PERRL. EOM intact. Conjunctivae are not pale. No scleral icterus.  ENT: Mucous membranes are moist. Oropharynx is clear and symmetric.    Neck: Supple. Full ROM. No lymphadenopathy.  Cardiovascular: Regular rate. Regular rhythm. No murmurs, rubs, or gallops. Distal pulses are 2+ and symmetric.  Pulmonary/Chest: No respiratory distress. Clear to auscultation bilaterally. No wheezing or rales.  Abdominal: Soft and non-distended.  There is no tenderness.  No rebound, guarding, or rigidity. Good bowel sounds.  Genitourinary: No CVA tenderness  Musculoskeletal: Moves all extremities. No obvious deformities. No edema. No calf tenderness.  Skin: Warm and dry.  Neurological:  Alert, awake, and appropriate.  Normal speech.  No acute focal neurological deficits are  appreciated.  Psychiatric: Normal affect. Good eye contact. Appropriate in content.     ED Course   ED Procedures:  Procedures    ED Vital Signs:  Vitals:    04/29/24 1506 04/29/24 1507 04/29/24 1645 04/29/24 1706   BP: (!) 108/50 (!) 92/46 135/63 (!) 121/57   Pulse: 70   64   Resp: 18   16   Temp:  98.7 °F (37.1 °C)     SpO2: 96%   100%   Weight:        04/29/24 1730 04/29/24 1735 04/29/24 1800 04/29/24 1830   BP:   139/69 (!) 120/56   Pulse: 60  66 64   Resp:       Temp:       SpO2:   95% 98%   Weight:  92.2 kg (203 lb 4.2 oz)      04/29/24 1900   BP: (!) 140/66   Pulse: 65   Resp: 18   Temp:    SpO2: 96%   Weight:        Abnormal Lab Results:  Labs Reviewed   CBC W/ AUTO DIFFERENTIAL - Abnormal; Notable for the following components:       Result Value    WBC 19.93 (*)     RBC 5.59 (*)     MCV 79 (*)     MCH 24.5 (*)     MCHC 31.2 (*)     RDW 14.6 (*)     Immature Granulocytes 0.8 (*)     Gran # (ANC) 12.9 (*)     Immature Grans (Abs) 0.16 (*)     Lymph # 5.5 (*)     Mono # 1.1 (*)     All other components within normal limits   COMPREHENSIVE METABOLIC PANEL - Abnormal; Notable for the following components:    Potassium 3.1 (*)     Glucose 129 (*)     BUN 29 (*)     eGFR 41 (*)     All other components within normal limits   URINALYSIS, REFLEX TO URINE CULTURE - Abnormal; Notable for the following components:    Appearance, UA Hazy (*)     Protein, UA Trace (*)     Leukocytes, UA 3+ (*)     All other components within normal limits    Narrative:     Specimen Source->Urine   URINALYSIS MICROSCOPIC - Abnormal; Notable for the following components:    RBC, UA 7 (*)     WBC, UA 19 (*)     Hyaline Casts, UA 64 (*)     All other components within normal limits    Narrative:     Specimen Source->Urine   CULTURE, URINE   TROPONIN I   TROPONIN I   B-TYPE NATRIURETIC PEPTIDE        All Lab Results:  Results for orders placed or performed during the hospital encounter of 04/29/24   CBC auto differential   Result Value Ref  Range    WBC 19.93 (H) 3.90 - 12.70 K/uL    RBC 5.59 (H) 4.00 - 5.40 M/uL    Hemoglobin 13.7 12.0 - 16.0 g/dL    Hematocrit 43.9 37.0 - 48.5 %    MCV 79 (L) 82 - 98 fL    MCH 24.5 (L) 27.0 - 31.0 pg    MCHC 31.2 (L) 32.0 - 36.0 g/dL    RDW 14.6 (H) 11.5 - 14.5 %    Platelets 383 150 - 450 K/uL    MPV 9.7 9.2 - 12.9 fL    Immature Granulocytes 0.8 (H) 0.0 - 0.5 %    Gran # (ANC) 12.9 (H) 1.8 - 7.7 K/uL    Immature Grans (Abs) 0.16 (H) 0.00 - 0.04 K/uL    Lymph # 5.5 (H) 1.0 - 4.8 K/uL    Mono # 1.1 (H) 0.3 - 1.0 K/uL    Eos # 0.2 0.0 - 0.5 K/uL    Baso # 0.08 0.00 - 0.20 K/uL    nRBC 0 0 /100 WBC    Gran % 64.7 38.0 - 73.0 %    Lymph % 27.6 18.0 - 48.0 %    Mono % 5.6 4.0 - 15.0 %    Eosinophil % 0.9 0.0 - 8.0 %    Basophil % 0.4 0.0 - 1.9 %    Differential Method Automated    Comprehensive metabolic panel   Result Value Ref Range    Sodium 138 136 - 145 mmol/L    Potassium 3.1 (L) 3.5 - 5.1 mmol/L    Chloride 102 95 - 110 mmol/L    CO2 25 23 - 29 mmol/L    Glucose 129 (H) 70 - 110 mg/dL    BUN 29 (H) 8 - 23 mg/dL    Creatinine 1.4 0.5 - 1.4 mg/dL    Calcium 9.9 8.7 - 10.5 mg/dL    Total Protein 7.2 6.0 - 8.4 g/dL    Albumin 3.5 3.5 - 5.2 g/dL    Total Bilirubin 0.5 0.1 - 1.0 mg/dL    Alkaline Phosphatase 113 55 - 135 U/L    AST 16 10 - 40 U/L    ALT 12 10 - 44 U/L    eGFR 41 (A) >60 mL/min/1.73 m^2    Anion Gap 11 8 - 16 mmol/L   Troponin I #1   Result Value Ref Range    Troponin I 0.009 0.000 - 0.026 ng/mL   Troponin I #2   Result Value Ref Range    Troponin I 0.015 0.000 - 0.026 ng/mL   BNP   Result Value Ref Range    BNP <10 0 - 99 pg/mL   Urinalysis, Reflex to Urine Culture Urine, Clean Catch    Specimen: Urine   Result Value Ref Range    Specimen UA Urine, Clean Catch     Color, UA Yellow Yellow, Straw, Juidth    Appearance, UA Hazy (A) Clear    pH, UA 5.0 5.0 - 8.0    Specific Gravity, UA 1.015 1.005 - 1.030    Protein, UA Trace (A) Negative    Glucose, UA Negative Negative    Ketones, UA Negative Negative     Bilirubin (UA) Negative Negative    Occult Blood UA Negative Negative    Nitrite, UA Negative Negative    Urobilinogen, UA Negative <2.0 EU/dL    Leukocytes, UA 3+ (A) Negative   Urinalysis Microscopic   Result Value Ref Range    RBC, UA 7 (H) 0 - 4 /hpf    WBC, UA 19 (H) 0 - 5 /hpf    Squam Epithel, UA 36 /hpf    Hyaline Casts, UA 64 (A) 0-1/lpf /lpf    Microscopic Comment SEE COMMENT         The EKG was ordered, reviewed, and independently interpreted by the ED provider:  ECG Results              EKG 12-lead (Preliminary result)  Result time 04/29/24 20:05:19      Wet Read by Maria Antonia Merritt DO (04/29/24 20:05:19, [facility] Emergency Medicine)    Sinus bradycardia.  Rate 59.  Q-wave in 3 and AVF.  No ST segment elevation.  No STEMI                                  ECG Results              EKG 12-lead (Preliminary result)  Result time 04/29/24 20:05:19      Wet Read by Maria Antonia Merritt DO (04/29/24 20:05:19, [facility] Emergency Medicine)    Sinus bradycardia.  Rate 59.  Q-wave in 3 and AVF.  No ST segment elevation.  No STEMI                                    Imaging Results:  Imaging Results              CT Abdomen Pelvis With IV Contrast NO Oral Contrast (Final result)  Result time 04/29/24 20:17:13      Final result by Angel Strickland MD (04/29/24 20:17:13)                   Impression:      No acute process    Findings as above    All CT scans at this facility use dose modulation, iterative reconstruction, and/or weight based dosing when appropriate to reduce radiation dose to as low as reasonably achievable.      Electronically signed by: Angel Strickland  Date:    04/29/2024  Time:    20:17               Narrative:    EXAMINATION:  CT ABDOMEN PELVIS WITH IV CONTRAST    CLINICAL HISTORY:  Nausea/vomiting;    TECHNIQUE:  Low dose axial images, sagittal and coronal reformations were obtained from the lung bases to the pubic symphysis following the IV administration of 100 mL  of Omnipaque 350.    COMPARISON:  None    FINDINGS:  Heart: Normal size as far as seen. No effusion as far as seen.    Lung Bases: Slight scarring in the right middle lobe.    Liver: Fatty infiltration liver with hepatomegaly.  No focal lesions.    Gallbladder: No calcified gallstones.    Bile Ducts: No dilatation.    Pancreas: No mass. No peripancreatic fat stranding.    Spleen: Normal.    Adrenals: Normal.    Kidneys/Ureters: Normal enhancement.  No mass or  hydroureteronephrosis.    Bladder: No wall thickening.    Reproductive organs: Calcified uterine fibroid.    GI Tract/Mesentery: No evidence of bowel obstruction or inflammation.  No evidence of acute appendicitis.  Normal appendix    Peritoneal Space: No ascites or free air.    Retroperitoneum: No significant adenopathy.    Abdominal wall: Normal.    Vasculature: No aneurysm.  Extensive atherosclerotic changes.    Bones: No acute fracture. No suspicious lytic or sclerotic lesions.                                       X-Ray Chest AP Portable (Final result)  Result time 04/29/24 17:59:17      Final result by Angel Strickland MD (04/29/24 17:59:17)                   Impression:      No acute abnormality.      Electronically signed by: Angel Strickland  Date:    04/29/2024  Time:    17:59               Narrative:    EXAMINATION:  XR CHEST AP PORTABLE    CLINICAL HISTORY:  Dizziness;    TECHNIQUE:  Single frontal view of the chest was performed.    COMPARISON:  None    FINDINGS:  Suggestion of emphysemathe lungs are clear, with normal appearance of pulmonary vasculature and no pleural effusion or pneumothorax.    Suspect prominent epicardial fat pad the hilar and mediastinal contours are unremarkable.    Bones are intact.  Senescent changes                                            The Emergency Provider reviewed the vital signs and test results, which are outlined above.     ED Discussion           ED Medication(s):  Medications   cefTRIAXone (Rocephin) 1 g in  dextrose 5 % in water (D5W) 100 mL IVPB (MB+) (1 g Intravenous New Bag 4/29/24 2133)   sodium chloride 0.9% bolus 1,000 mL 1,000 mL (0 mLs Intravenous Stopped 4/29/24 1926)   ondansetron injection 4 mg (4 mg Intravenous Given 4/29/24 1821)   potassium chloride SA CR tablet 40 mEq (40 mEq Oral Given 4/29/24 1938)   iohexoL (OMNIPAQUE 350) injection 100 mL (100 mLs Intravenous Given 4/29/24 1949)       ED Course as of 04/29/24 2154 Mon Apr 29, 2024 1927 Potassium(!): 3.1 [LB]   1927 WBC(!): 19.93 [LB]   2102 Cardiac monitor interpretation  Independent interpretation  Indication:  Nausea  Normal sinus rhythm.  Rate 64.  No STEMI [RT]      ED Course User Index  [LB] Maria Antonia Merritt, DO  [RT] Zane Amaya Jr., MD          MIPS Measures     Smoker? Yes, patient is a former smoker.     Hypertension: History of Hypertension: The patient has elevated blood pressure (higher than 120/80) while being treated in the ED but has a history of hypertension.       Medical Decision Making     8:00 PM: Dr. Merritt transfers care of patient to Dr. Amaya pending radiology results.     8:12 PM: Dr. Amaya agrees with Dr. Merritt's assessment and plan of care. Evaluated pt. Pt is resting comfortably and is in no acute distress.  D/w pt all pertinent results. D/w pt any concerns expressed at this time. Answered all questions. Pt expresses understanding at this time.    9:06 PM: Reassessed pt at this time. Discussed with pt all pertinent ED information and results. Discussed pt dx and plan of tx. Gave pt all f/u and return to the ED instructions. All questions and concerns were addressed at this time. Pt expresses understanding of information and instructions, and is comfortable with plan to discharge. Pt is stable for discharge.    I discussed with patient and/or family/caretaker that evaluation in the ED does not suggest any emergent or life threatening medical conditions requiring immediate intervention beyond what was  provided in the ED, and I believe patient is safe for discharge.  Regardless, an unremarkable evaluation in the ED does not preclude the development or presence of a serious of life threatening condition. As such, patient was instructed to return immediately for any worsening or change in current symptoms.                Medical Decision Making  Differential diagnosis:  UTI, nausea vomiting, colitis, gastroenteritis    Patient was evaluated history and physical examination.  Patient was complaining of nausea and vomiting with some light headedness.  She was treated with fluids antiemetics workup was undertaken.  She has a mild UTI on exam with a normal troponin normal BNP CMP showing mild hypokalemia at 3.1 which was treated.  She had a leukocytosis with a 19,000 white count with a hemoglobin of 13.7.  There was a mild left shift.  Patient was a chest x-ray that was clear CT imaging showed no acute findings.  Patient was treated with Rocephin will be discharged home on antiemetics as well as Keflex.  Discussed all findings with the patient was well as plan of care.  She verbalized agreement understanding with all instructions seems reliable she was stable safe for discharge in my opinion    Amount and/or Complexity of Data Reviewed  External Data Reviewed: labs and notes.  Labs: ordered. Decision-making details documented in ED Course.  Radiology: ordered. Decision-making details documented in ED Course.    Risk  OTC drugs.  Prescription drug management.  Drug therapy requiring intensive monitoring for toxicity.  Decision regarding hospitalization.        Coding    Prescription Management: I performed a review of the patient's current Rx medication list as input by nursing staff.    Patient's Medications   New Prescriptions    CEPHALEXIN (KEFLEX) 500 MG CAPSULE    Take 1 capsule (500 mg total) by mouth 4 (four) times daily. for 7 days    ONDANSETRON (ZOFRAN) 4 MG TABLET    Take 1 tablet (4 mg total) by mouth every 6  "(six) hours as needed for Nausea.   Previous Medications    ALBUTEROL (PROVENTIL/VENTOLIN HFA) 90 MCG/ACTUATION INHALER    INHALE 2 PUFFS INTO THE LUNGS EVERY 4 (FOUR) HOURS AS NEEDED FOR WHEEZING OR SHORTNESS OF BREATH. RESCUE    AMLODIPINE (NORVASC) 10 MG TABLET    Take 10 mg by mouth once daily.    ATORVASTATIN (LIPITOR) 80 MG TABLET    Take 1 tablet (80 mg total) by mouth every evening.    CHOLECALCIFEROL, VITAMIN D3, 1,250 MCG (50,000 UNIT) CAPSULE    Take 50,000 Units by mouth every 7 days.    CLOPIDOGREL (PLAVIX) 75 MG TABLET    TAKE 1 TABLET EVERY DAY    DOXYCYCLINE (VIBRAMYCIN) 100 MG CAP    Take 1 capsule (100 mg total) by mouth 2 (two) times daily.    EZETIMIBE (ZETIA) 10 MG TABLET    Take 10 mg by mouth once daily.    FLUTICASONE-SALMETEROL DISKUS INHALER 250-50 MCG    INHALE 1 PUFF INTO THE LUNGS 2 (TWO) TIMES DAILY. WASH OUT MOUTH AFTER USE.    FUROSEMIDE (LASIX) 20 MG TABLET    Take 1 tablet (20 mg total) by mouth daily as needed (swelling).    LISINOPRIL-HYDROCHLOROTHIAZIDE (PRINZIDE,ZESTORETIC) 20-12.5 MG PER TABLET    Take 1 tablet by mouth 2 (two) times a day.    METFORMIN (GLUCOPHAGE-XR) 500 MG ER 24HR TABLET    metformin  mg tablet,extended release 24 hr    METOPROLOL SUCCINATE (TOPROL-XL) 50 MG 24 HR TABLET    Take 1 tablet (50 mg total) by mouth once daily.    PANTOPRAZOLE (PROTONIX) 40 MG TABLET    Take 40 mg by mouth once daily.    SERTRALINE (ZOLOFT) 100 MG TABLET    Take 100 mg by mouth once daily.    TRUE METRIX GLUCOSE TEST STRIP STRP    SMARTSIG:Via Meter    TRUEPLUS LANCETS 33 GAUGE MISC       Modified Medications    No medications on file   Discontinued Medications    No medications on file        Portions of this note may have been created with voice recognition software. Occasional "wrong-word" or "sound-a-like" substitutions may have occurred due to the inherent limitations of voice recognition software. Please, read the note carefully and recognize, using context, where " substitutions have occurred.          Clinical Impression       ICD-10-CM ICD-9-CM   1. Nausea  R11.0 787.02   2. Dizziness  R42 780.4   3. Acute lower UTI  N39.0 599.0         ED Disposition  Disposition:   Disposition: Discharged  Condition: Stable      ED Follow-up   Follow-up Information       Alayna Greenwood PA In 2 days.    Specialty: Internal Medicine  Contact information:  [address] [facility] [address]  [phone]                               Scribe Attestation:   Scribe #1: I performed the above scribed service and the documentation accurately describes the services I performed. I attest to the accuracy of the note.     Attending:   Physician Attestation Statement for Scribe #1: I, Maria Antonia Merritt DO, personally performed the services described in this documentation, as scribed by Mira Tee, in my presence, and it is both accurate and complete.       Scribe Attestation:   Scribe #2: I performed the above scribed service and the documentation accurately describes the services I performed. I attest to the accuracy of the note.    Attending Attestation:           Physician Attestation for Scribe:    Physician Attestation Statement for Scribe #2: I, Zane Amaya MD, reviewed documentation, as scribed by Mechelle Krueger in my presence, and it is both accurate and complete. I also acknowledge and confirm the content of the note done by Scribe #1.                Zane Amaya Jr., MD  04/29/24 [id number]

## 2024-04-30 LAB
OHS QRS DURATION: 88 MS
OHS QTC CALCULATION: 433 MS

## 2024-04-30 NOTE — DISCHARGE INSTRUCTIONS
Your nausea appears to be related to a mild bladder infection.  Use Zofran for nausea and Keflex for your infection.  Follow up with your primary care provider 1-2 days for re-evaluation and return as needed for any worsening symptoms, problems, questions or concerns

## 2024-05-01 LAB
BACTERIA UR CULT: NORMAL
BACTERIA UR CULT: NORMAL

## 2024-05-03 ENCOUNTER — HOSPITAL ENCOUNTER (OUTPATIENT)
Dept: RADIOLOGY | Facility: HOSPITAL | Age: 69
Discharge: HOME | End: 2024-05-03
Attending: INTERNAL MEDICINE
Payer: MEDICARE

## 2024-05-03 ENCOUNTER — OFFICE VISIT (OUTPATIENT)
Dept: PULMONOLOGY | Facility: CLINIC | Age: 69
End: 2024-05-03
Attending: INTERNAL MEDICINE
Payer: MEDICARE

## 2024-05-03 VITALS
HEART RATE: 63 BPM | RESPIRATION RATE: 18 BRPM | SYSTOLIC BLOOD PRESSURE: 130 MMHG | BODY MASS INDEX: 34.26 KG/M2 | WEIGHT: 181.44 LBS | DIASTOLIC BLOOD PRESSURE: 72 MMHG | HEIGHT: 61 IN | OXYGEN SATURATION: 96 %

## 2024-05-03 DIAGNOSIS — R06.02 SOB (SHORTNESS OF BREATH): ICD-10-CM

## 2024-05-03 DIAGNOSIS — J44.9 COPD, MILD: Primary | ICD-10-CM

## 2024-05-03 LAB
ALLENS TEST: ABNORMAL
BRPFT: NORMAL
DELSYS: ABNORMAL
FEF 25 75 CHG: -9.7 %
FEF 25 75 LLN: 0.62
FEF 25 75 POST REF: 37.4 %
FEF 25 75 PRE REF: 41.4 %
FEF 25 75 REF: 1.59
FET100 CHG: 7.8 %
FEV1 CHG: -4.1 %
FEV1 FVC CHG: -2.5 %
FEV1 FVC LLN: 66
FEV1 FVC POST REF: 84.1 %
FEV1 FVC PRE REF: 86.2 %
FEV1 FVC REF: 79
FEV1 LLN: 1.23
FEV1 POST REF: 69.2 %
FEV1 PRE REF: 72.2 %
FEV1 REF: 1.76
FIO2: 21
FVC CHG: -1.7 %
FVC LLN: 1.59
FVC POST REF: 81.9 %
FVC PRE REF: 83.3 %
FVC REF: 2.23
HCO3 UR-SCNC: 28.1 MMOL/L (ref 24–28)
MODE: ABNORMAL
PCO2 BLDA: 37 MMHG (ref 35–45)
PEF CHG: -20.7 %
PEF LLN: 2.72
PEF POST REF: 74.1 %
PEF PRE REF: 93.4 %
PEF REF: 4.54
PH SMN: 7.49 [PH] (ref 7.35–7.45)
PO2 BLDA: 79 MMHG (ref 80–100)
POC BE: 5 MMOL/L
POC SATURATED O2: 97 % (ref 95–100)
POST FEF 25 75: 0.59 L/S
POST FET 100: 10.78 SEC
POST FEV1 FVC: 66.58 %
POST FEV1: 1.22 L
POST FVC: 1.82 L
POST PEF: 3.36 L/S
PRE FEF 25 75: 0.66 L/S
PRE FET 100: 10 SEC
PRE FEV1 FVC: 68.27 %
PRE FEV1: 1.27 L
PRE FVC: 1.86 L
PRE PEF: 4.24 L/S
SAMPLE: ABNORMAL
SITE: ABNORMAL

## 2024-05-03 PROCEDURE — 1160F RVW MEDS BY RX/DR IN RCRD: CPT | Mod: CPTII,S$GLB,, | Performed by: PHYSICIAN ASSISTANT

## 2024-05-03 PROCEDURE — 3078F DIAST BP <80 MM HG: CPT | Mod: CPTII,S$GLB,, | Performed by: PHYSICIAN ASSISTANT

## 2024-05-03 PROCEDURE — 3008F BODY MASS INDEX DOCD: CPT | Mod: CPTII,S$GLB,, | Performed by: PHYSICIAN ASSISTANT

## 2024-05-03 PROCEDURE — 71046 X-RAY EXAM CHEST 2 VIEWS: CPT | Mod: 26,,, | Performed by: RADIOLOGY

## 2024-05-03 PROCEDURE — 71046 X-RAY EXAM CHEST 2 VIEWS: CPT | Mod: TC

## 2024-05-03 PROCEDURE — 3075F SYST BP GE 130 - 139MM HG: CPT | Mod: CPTII,S$GLB,, | Performed by: PHYSICIAN ASSISTANT

## 2024-05-03 PROCEDURE — 99999 PR PBB SHADOW E&M-EST. PATIENT-LVL IV: CPT | Mod: PBBFAC,,, | Performed by: PHYSICIAN ASSISTANT

## 2024-05-03 PROCEDURE — 82803 BLOOD GASES ANY COMBINATION: CPT | Mod: S$GLB,,, | Performed by: INTERNAL MEDICINE

## 2024-05-03 PROCEDURE — 1159F MED LIST DOCD IN RCRD: CPT | Mod: CPTII,S$GLB,, | Performed by: PHYSICIAN ASSISTANT

## 2024-05-03 PROCEDURE — 4010F ACE/ARB THERAPY RXD/TAKEN: CPT | Mod: CPTII,S$GLB,, | Performed by: PHYSICIAN ASSISTANT

## 2024-05-03 PROCEDURE — 99214 OFFICE O/P EST MOD 30 MIN: CPT | Mod: S$GLB,,, | Performed by: PHYSICIAN ASSISTANT

## 2024-05-03 PROCEDURE — 94060 EVALUATION OF WHEEZING: CPT | Mod: S$GLB,,, | Performed by: INTERNAL MEDICINE

## 2024-05-03 PROCEDURE — 1101F PT FALLS ASSESS-DOCD LE1/YR: CPT | Mod: CPTII,S$GLB,, | Performed by: PHYSICIAN ASSISTANT

## 2024-05-03 PROCEDURE — 36600 WITHDRAWAL OF ARTERIAL BLOOD: CPT | Mod: 59,S$GLB,, | Performed by: INTERNAL MEDICINE

## 2024-05-03 PROCEDURE — 3288F FALL RISK ASSESSMENT DOCD: CPT | Mod: CPTII,S$GLB,, | Performed by: PHYSICIAN ASSISTANT

## 2024-05-03 RX ORDER — RIFAXIMIN 550 MG/1
550 TABLET ORAL 3 TIMES DAILY
COMMUNITY
Start: 2024-02-02

## 2024-05-03 RX ORDER — GABAPENTIN 300 MG/1
300 CAPSULE ORAL
COMMUNITY
Start: 2021-10-19 | End: 2025-03-07

## 2024-05-03 NOTE — PROGRESS NOTES
Subjective:       Patient ID: Jacqueline Luna is a 68 y.o. female.    Chief Complaint: COPD      5/3/2024  Established patient history of COPD on Advair BID and albuterol prn here for pulmonary risk assessment visit  Scheduled for bladder prolapse repair and hysterectomy with Dr. Godwin and Makenzie  Last surgery was less than year ago on thumb - no post operative complications  She feels COPD is well controlled  Has chronic shortness of breath on exertion; mrC 1  No cough, fever, chest pain, or wheezing today  Did recently have COPD exacerbation; went to ER and improved with outpatient doxycyline and prednisone  History of mild RONALDO but cannot tolerate CPAP    Immunization History   Administered Date(s) Administered    COVID-19 Vaccine 06/02/2021, 06/30/2021    COVID-19, MRNA, LN-S, PF (MODERNA FULL 0.5 ML DOSE) 06/02/2021, 06/30/2021    COVID-19, MRNA, LN-S, PF (Pfizer) (Purple Cap) 02/03/2022    Tdap 10/06/2022      Tobacco Use: Medium Risk (5/3/2024)    Patient History     Smoking Tobacco Use: Former     Smokeless Tobacco Use: Unknown     Passive Exposure: Not on file      Past Medical History:   Diagnosis Date    Anxiety disorder, unspecified     COPD (chronic obstructive pulmonary disease)     Depression     Family history of malignant neoplasm of breast 8/8/2023    Fibrocystic breast     HTN (hypertension)     Hypercholesteremia     Mass of multiple sites of left breast 08/07/2023    Mass of multiple sites of right breast 08/07/2023    Mastodynia 8/8/2023    Mild intermittent asthma, uncomplicated     Type 2 diabetes mellitus without complications       Current Outpatient Medications on File Prior to Visit   Medication Sig Dispense Refill    albuterol (PROVENTIL/VENTOLIN HFA) 90 mcg/actuation inhaler INHALE 2 PUFFS INTO THE LUNGS EVERY 4 (FOUR) HOURS AS NEEDED FOR WHEEZING OR SHORTNESS OF BREATH. RESCUE 54 g 3    amLODIPine (NORVASC) 10 MG tablet Take 10 mg by mouth once daily.      cephALEXin (KEFLEX) 500  MG capsule Take 1 capsule (500 mg total) by mouth 4 (four) times daily. for 7 days 28 capsule 0    cholecalciferol, vitamin D3, 1,250 mcg (50,000 unit) capsule Take 50,000 Units by mouth every 7 days.      clopidogreL (PLAVIX) 75 mg tablet TAKE 1 TABLET EVERY DAY 90 tablet 3    doxycycline (VIBRAMYCIN) 100 MG Cap Take 1 capsule (100 mg total) by mouth 2 (two) times daily. 10 capsule 0    ezetimibe (ZETIA) 10 mg tablet Take 10 mg by mouth once daily.      fluticasone-salmeterol diskus inhaler 250-50 mcg INHALE 1 PUFF INTO THE LUNGS 2 (TWO) TIMES DAILY. WASH OUT MOUTH AFTER USE. 180 each 3    gabapentin (NEURONTIN) 300 MG capsule Take 300 mg by mouth.      lisinopriL-hydrochlorothiazide (PRINZIDE,ZESTORETIC) 20-12.5 mg per tablet Take 1 tablet by mouth 2 (two) times a day.      metFORMIN (GLUCOPHAGE-XR) 500 MG ER 24hr tablet metformin  mg tablet,extended release 24 hr      metoprolol succinate (TOPROL-XL) 50 MG 24 hr tablet Take 1 tablet (50 mg total) by mouth once daily. 90 tablet 3    ondansetron (ZOFRAN) 4 MG tablet Take 1 tablet (4 mg total) by mouth every 6 (six) hours as needed for Nausea. 12 tablet 0    pantoprazole (PROTONIX) 40 MG tablet Take 40 mg by mouth once daily.      sertraline (ZOLOFT) 100 MG tablet Take 100 mg by mouth once daily.      TRUE METRIX GLUCOSE TEST STRIP Strp SMARTSIG:Via Meter      TRUEPLUS LANCETS 33 gauge Misc       XIFAXAN 550 mg Tab Take 550 mg by mouth 3 (three) times daily.      atorvastatin (LIPITOR) 80 MG tablet Take 1 tablet (80 mg total) by mouth every evening. (Patient taking differently: Take 80 mg by mouth once daily.) 30 tablet 11    furosemide (LASIX) 20 MG tablet Take 1 tablet (20 mg total) by mouth daily as needed (swelling). 30 tablet 11     Current Facility-Administered Medications on File Prior to Visit   Medication Dose Route Frequency Provider Last Rate Last Admin    chlorhexidine 0.12 % solution 10 mL  10 mL Mouth/Throat On Call Procedure Cassidy Malik PA-C   " 10 mL at 08/11/23 1130        Review of Systems   Constitutional:  Negative for fever, weight loss, appetite change and weakness.   HENT:  Negative for postnasal drip, rhinorrhea, sinus pressure, trouble swallowing and congestion.    Respiratory:  Positive for cough and dyspnea on extertion. Negative for sputum production, choking, chest tightness, shortness of breath and wheezing.    Cardiovascular:  Negative for chest pain and leg swelling.   Musculoskeletal:  Negative for joint swelling.   Gastrointestinal:  Negative for nausea and vomiting.   Neurological:  Negative for dizziness, weakness and headaches.   All other systems reviewed and are negative.      Objective:       Vitals:    05/03/24 1428   BP: 130/72   Pulse: 63   Resp: 18   SpO2: 96%   Weight: 82.3 kg (181 lb 7 oz)   Height: 5' 1" (1.549 m)       Physical Exam   Constitutional: She is oriented to person, place, and time. She appears well-developed and well-nourished. No distress.   HENT:   Head: Normocephalic.   Mouth/Throat: Oropharynx is clear and moist.   Cardiovascular: Normal rate and regular rhythm.   Pulmonary/Chest: Effort normal. No respiratory distress. She has no wheezes. She has no rhonchi. She has no rales.   Musculoskeletal:         General: No edema.      Cervical back: Normal range of motion and neck supple.   Neurological: She is alert and oriented to person, place, and time. Gait normal.   Skin: Skin is warm and dry.   Psychiatric: She has a normal mood and affect.   Vitals reviewed.    Personal Diagnostic Review    X-Ray Chest PA And Lateral  Narrative: EXAM: XR CHEST PA AND LATERAL    CLINICAL HISTORY:  Shortness of breath    TECHNIQUE: 2 view chest    PRIOR:  August 2023    FINDINGS:  The cardiomediastinum is normal and the lungs are clear.  Thoracic spondylosis  Impression:  Stable exam without acute cardiopulmonary disease    Finalized on: 5/3/2024 2:06 PM By:  Quirino Mi MD  [facility]# [id number]      2024-05-03 14:08:[id number]    " [facility]    Recent Labs     05/03/24  1409   PH 7.487*   PCO2 37.0   PO2 79*   HCO3 28.1*   POCSATURATED 97   BE 5*     5/3/24  Spirometry is normal    PRE OPERATIVE PULMONARY RISK ASSESSMENT:     PROCEDURE: hysterectomy and bladder repair     ANESTHESIA: GA     Patient is a moderate to high risk for perioperative pulmonary complications due to:              [x]            COPD                [x]            RONALDO  [x]            General Anesthsia  [x]            long-acting neuromuscular blockade.     Risks and possible pulmonary complications of surgery include risk of respiratory failure requiring mechanical ventilation, post operative pneumonia, post operative atelectasis, deep venous thrombosis, pulmonary embolism and death.    Based on my assessment , it is okay to proceed with procedure PROVIDED RISK IS ACCEPTABLE TO BOTH THE PATIENT AND THE SURGEON PERFORMING THE SURGERY.     ARISCAT Score 51       0 to 25 points: Low risk: 1.6% pulmonary complication rate   26 to 44 points: Intermediate risk: 13.3% pulmonary complication rate   45 to 123 points: High risk: 42.1% pulmonary complication rate            RECOMMENDATIONS FOR RISK MITIGATION:    1. Preoperative pulmonary workup: complete  2. Change in pulmonary medication regimen before surgery: none, continue medication regimen including morning of surgery, with sip of water.  3. Prophylaxis for cardiac events with perioperative beta-blockers: should be considered, specific regimen per anesthesia.  4. Invasive hemodynamic monitoring perioperatively: should be considered.  5. Deep vein thrombosis prophylaxis postoperatively:regimen to be chosen by surgical team.  6. Surveillance for postoperative MI with ECG immediately postoperatively and on postoperative days 1 and 2 AND troponin levels 24 hours postoperatively and on day 4 or hospital discharge (whichever comes first): should be considered.  7. Begin patient education regarding lung-expansion maneuvers like deep  breathing and incentive spirometry.   8. Limit duration of surgery to less than 3 hr if possible.  9. Use spinal or epidural anesthesia if possible.  10. Avoid use of pancuronium or long acting neuromuscular blockers.  11. Use deep-breathing exercises or incentive spirometry.  12. Assure perioperative thromboprophylaxis and adequate post operative analgesia.           Assessment/Plan:       Risks and possible pulmonary complications of surgery include risk of respiratory failure requiring mechanical ventilation, post operative pneumonia, post operative atelectasis, deep venous thrombosis, pulmonary embolism and death.    Based on my assessment , it is okay to proceed with procedure PROVIDED RISK IS ACCEPTABLE TO BOTH THE PATIENT AND THE SURGEON PERFORMING THE SURGERY.  Problem List Items Addressed This Visit    None  Visit Diagnoses       COPD, mild    -  Primary    Relevant Orders    Stress test, pulmonary          Continue Advair and albuterol as prescribed    Follow up in about 6 months (around 11/3/2024) for walk test and copd follow up.    Discussed diagnosis, its evaluation, treatment and usual course. All questions answered.    Patient verbalized understanding of plan and left in no acute distress    Thank you for the courtesy of participating in the care of this patient    Elizabeth G Blough, PA-C [facility]

## 2024-05-03 NOTE — PROCEDURES
ABG completed. See ABG Below.    Component      Latest Ref Rng 5/3/2024   POC PH      7.35 - 7.45  7.487 (H)    POC PCO2      35 - 45 mmHg 37.0    POC PO2      80 - 100 mmHg 79 (L)    POC HCO3      24 - 28 mmol/L 28.1 (H)    POC BE      -2 to 2 mmol/L 5 (H)    POC SATURATED O2      95 - 100 % 97    Sample ARTERIAL    Site LR    Allens Test Pass    DelSys Room Air    Mode SPONT    FiO2 21       Legend:  (H) High  (L) Low      Interpretation:    [x] Arterial blood gases on room air are abnormal demonstrating hypoxemia (pO2 < 80 mmHg)  )    The table below summarizes the main interpretations of the relationship between the arterial blood gases, pH, pCO2 and HCO-3    []    I

## 2024-08-11 DIAGNOSIS — J43.9 PULMONARY EMPHYSEMA, UNSPECIFIED EMPHYSEMA TYPE: ICD-10-CM

## 2024-08-12 RX ORDER — ALBUTEROL SULFATE 90 UG/1
2 INHALANT RESPIRATORY (INHALATION) EVERY 4 HOURS PRN
Qty: 54 G | Refills: 3 | Status: SHIPPED | OUTPATIENT
Start: 2024-08-12

## 2024-08-29 ENCOUNTER — HOSPITAL ENCOUNTER (EMERGENCY)
Facility: HOSPITAL | Age: 69
Discharge: HOME | End: 2024-08-29
Attending: STUDENT IN AN ORGANIZED HEALTH CARE EDUCATION/TRAINING PROGRAM
Payer: MEDICARE

## 2024-08-29 VITALS
TEMPERATURE: 98 F | RESPIRATION RATE: 22 BRPM | HEART RATE: 84 BPM | HEIGHT: 61 IN | SYSTOLIC BLOOD PRESSURE: 172 MMHG | BODY MASS INDEX: 33.99 KG/M2 | DIASTOLIC BLOOD PRESSURE: 80 MMHG | WEIGHT: 180 LBS | OXYGEN SATURATION: 95 %

## 2024-08-29 DIAGNOSIS — R50.9 FEVER, UNSPECIFIED FEVER CAUSE: ICD-10-CM

## 2024-08-29 DIAGNOSIS — J18.9 PNEUMONIA OF LEFT LOWER LOBE DUE TO INFECTIOUS ORGANISM: Primary | ICD-10-CM

## 2024-08-29 DIAGNOSIS — R07.9 CHEST PAIN: ICD-10-CM

## 2024-08-29 LAB
ALBUMIN SERPL BCP-MCNC: 3.4 G/DL (ref 3.5–5.2)
ALP SERPL-CCNC: 115 U/L (ref 55–135)
ALT SERPL W/O P-5'-P-CCNC: 15 U/L (ref 10–44)
ANION GAP SERPL CALC-SCNC: 12 MMOL/L (ref 8–16)
AST SERPL-CCNC: 19 U/L (ref 10–40)
BASOPHILS # BLD AUTO: 0.02 K/UL (ref 0–0.2)
BASOPHILS NFR BLD: 0.2 % (ref 0–1.9)
BILIRUB SERPL-MCNC: 0.3 MG/DL (ref 0.1–1)
BILIRUB UR QL STRIP: NEGATIVE
BNP SERPL-MCNC: 30 PG/ML (ref 0–99)
BUN SERPL-MCNC: 9 MG/DL (ref 8–23)
CALCIUM SERPL-MCNC: 9.2 MG/DL (ref 8.7–10.5)
CHLORIDE SERPL-SCNC: 103 MMOL/L (ref 95–110)
CLARITY UR: CLEAR
CO2 SERPL-SCNC: 23 MMOL/L (ref 23–29)
COLOR UR: YELLOW
CREAT SERPL-MCNC: 0.8 MG/DL (ref 0.5–1.4)
DIFFERENTIAL METHOD BLD: ABNORMAL
EOSINOPHIL # BLD AUTO: 0.1 K/UL (ref 0–0.5)
EOSINOPHIL NFR BLD: 0.8 % (ref 0–8)
ERYTHROCYTE [DISTWIDTH] IN BLOOD BY AUTOMATED COUNT: 14.6 % (ref 11.5–14.5)
EST. GFR  (NO RACE VARIABLE): >60 ML/MIN/1.73 M^2
GLUCOSE SERPL-MCNC: 109 MG/DL (ref 70–110)
GLUCOSE UR QL STRIP: NEGATIVE
HCT VFR BLD AUTO: 41.1 % (ref 37–48.5)
HGB BLD-MCNC: 13.2 G/DL (ref 12–16)
HGB UR QL STRIP: NEGATIVE
IMM GRANULOCYTES # BLD AUTO: 0.05 K/UL (ref 0–0.04)
IMM GRANULOCYTES NFR BLD AUTO: 0.5 % (ref 0–0.5)
INFLUENZA A, MOLECULAR: NEGATIVE
INFLUENZA B, MOLECULAR: NEGATIVE
KETONES UR QL STRIP: NEGATIVE
LEUKOCYTE ESTERASE UR QL STRIP: NEGATIVE
LYMPHOCYTES # BLD AUTO: 1.4 K/UL (ref 1–4.8)
LYMPHOCYTES NFR BLD: 14.3 % (ref 18–48)
MCH RBC QN AUTO: 25.3 PG (ref 27–31)
MCHC RBC AUTO-ENTMCNC: 32.1 G/DL (ref 32–36)
MCV RBC AUTO: 79 FL (ref 82–98)
MONOCYTES # BLD AUTO: 0.8 K/UL (ref 0.3–1)
MONOCYTES NFR BLD: 8.4 % (ref 4–15)
NEUTROPHILS # BLD AUTO: 7.5 K/UL (ref 1.8–7.7)
NEUTROPHILS NFR BLD: 75.8 % (ref 38–73)
NITRITE UR QL STRIP: NEGATIVE
NRBC BLD-RTO: 0 /100 WBC
OHS QRS DURATION: 76 MS
OHS QTC CALCULATION: 475 MS
PH UR STRIP: 6 [PH] (ref 5–8)
PLATELET # BLD AUTO: 235 K/UL (ref 150–450)
PMV BLD AUTO: 10.7 FL (ref 9.2–12.9)
POTASSIUM SERPL-SCNC: 3.1 MMOL/L (ref 3.5–5.1)
PROT SERPL-MCNC: 7.4 G/DL (ref 6–8.4)
PROT UR QL STRIP: NEGATIVE
RBC # BLD AUTO: 5.22 M/UL (ref 4–5.4)
SARS-COV-2 RDRP RESP QL NAA+PROBE: NEGATIVE
SODIUM SERPL-SCNC: 138 MMOL/L (ref 136–145)
SP GR UR STRIP: 1.02 (ref 1–1.03)
SPECIMEN SOURCE: NORMAL
TROPONIN I SERPL DL<=0.01 NG/ML-MCNC: <0.006 NG/ML (ref 0–0.03)
TROPONIN I SERPL DL<=0.01 NG/ML-MCNC: <0.006 NG/ML (ref 0–0.03)
URN SPEC COLLECT METH UR: NORMAL
UROBILINOGEN UR STRIP-ACNC: NEGATIVE EU/DL
WBC # BLD AUTO: 9.95 K/UL (ref 3.9–12.7)

## 2024-08-29 PROCEDURE — 81003 URINALYSIS AUTO W/O SCOPE: CPT | Performed by: STUDENT IN AN ORGANIZED HEALTH CARE EDUCATION/TRAINING PROGRAM

## 2024-08-29 PROCEDURE — 87502 INFLUENZA DNA AMP PROBE: CPT | Performed by: STUDENT IN AN ORGANIZED HEALTH CARE EDUCATION/TRAINING PROGRAM

## 2024-08-29 PROCEDURE — 96375 TX/PRO/DX INJ NEW DRUG ADDON: CPT

## 2024-08-29 PROCEDURE — 80053 COMPREHEN METABOLIC PANEL: CPT | Performed by: REGISTERED NURSE

## 2024-08-29 PROCEDURE — 96365 THER/PROPH/DIAG IV INF INIT: CPT

## 2024-08-29 PROCEDURE — 83880 ASSAY OF NATRIURETIC PEPTIDE: CPT | Performed by: REGISTERED NURSE

## 2024-08-29 PROCEDURE — 87040 BLOOD CULTURE FOR BACTERIA: CPT | Mod: 59 | Performed by: STUDENT IN AN ORGANIZED HEALTH CARE EDUCATION/TRAINING PROGRAM

## 2024-08-29 PROCEDURE — 63600175 PHARM REV CODE 636 W HCPCS: Performed by: STUDENT IN AN ORGANIZED HEALTH CARE EDUCATION/TRAINING PROGRAM

## 2024-08-29 PROCEDURE — 96367 TX/PROPH/DG ADDL SEQ IV INF: CPT

## 2024-08-29 PROCEDURE — 96361 HYDRATE IV INFUSION ADD-ON: CPT

## 2024-08-29 PROCEDURE — U0002 COVID-19 LAB TEST NON-CDC: HCPCS | Performed by: STUDENT IN AN ORGANIZED HEALTH CARE EDUCATION/TRAINING PROGRAM

## 2024-08-29 PROCEDURE — 99285 EMERGENCY DEPT VISIT HI MDM: CPT | Mod: 25

## 2024-08-29 PROCEDURE — 84484 ASSAY OF TROPONIN QUANT: CPT | Performed by: REGISTERED NURSE

## 2024-08-29 PROCEDURE — 93005 ELECTROCARDIOGRAM TRACING: CPT

## 2024-08-29 PROCEDURE — 25500020 PHARM REV CODE 255: Performed by: STUDENT IN AN ORGANIZED HEALTH CARE EDUCATION/TRAINING PROGRAM

## 2024-08-29 PROCEDURE — 85025 COMPLETE CBC W/AUTO DIFF WBC: CPT | Performed by: REGISTERED NURSE

## 2024-08-29 PROCEDURE — 25000003 PHARM REV CODE 250: Performed by: STUDENT IN AN ORGANIZED HEALTH CARE EDUCATION/TRAINING PROGRAM

## 2024-08-29 PROCEDURE — 93010 ELECTROCARDIOGRAM REPORT: CPT | Mod: ,,, | Performed by: INTERNAL MEDICINE

## 2024-08-29 RX ORDER — AMOXICILLIN AND CLAVULANATE POTASSIUM 875; 125 MG/1; MG/1
1 TABLET, FILM COATED ORAL 2 TIMES DAILY
Qty: 14 TABLET | Refills: 0 | Status: SHIPPED | OUTPATIENT
Start: 2024-08-29

## 2024-08-29 RX ORDER — DOXYCYCLINE 100 MG/1
100 CAPSULE ORAL 2 TIMES DAILY
Qty: 20 CAPSULE | Refills: 0 | Status: SHIPPED | OUTPATIENT
Start: 2024-08-29 | End: 2024-09-08

## 2024-08-29 RX ORDER — ACETAMINOPHEN 500 MG
1000 TABLET ORAL
Status: COMPLETED | OUTPATIENT
Start: 2024-08-29 | End: 2024-08-29

## 2024-08-29 RX ORDER — ONDANSETRON HYDROCHLORIDE 2 MG/ML
4 INJECTION, SOLUTION INTRAVENOUS
Status: COMPLETED | OUTPATIENT
Start: 2024-08-29 | End: 2024-08-29

## 2024-08-29 RX ADMIN — PIPERACILLIN SODIUM AND TAZOBACTAM SODIUM 4.5 G: 4; .5 INJECTION, POWDER, FOR SOLUTION INTRAVENOUS at 02:08

## 2024-08-29 RX ADMIN — ACETAMINOPHEN 1000 MG: 500 TABLET ORAL at 11:08

## 2024-08-29 RX ADMIN — DOXYCYCLINE 100 MG: 100 INJECTION, POWDER, LYOPHILIZED, FOR SOLUTION INTRAVENOUS at 04:08

## 2024-08-29 RX ADMIN — SODIUM CHLORIDE, POTASSIUM CHLORIDE, SODIUM LACTATE AND CALCIUM CHLORIDE 1000 ML: 600; 310; 30; 20 INJECTION, SOLUTION INTRAVENOUS at 11:08

## 2024-08-29 RX ADMIN — CEFTRIAXONE 2 G: 2 INJECTION, POWDER, FOR SOLUTION INTRAMUSCULAR; INTRAVENOUS at 06:08

## 2024-08-29 RX ADMIN — ONDANSETRON 4 MG: 2 INJECTION INTRAMUSCULAR; INTRAVENOUS at 01:08

## 2024-08-29 RX ADMIN — IOHEXOL 100 ML: 350 INJECTION, SOLUTION INTRAVENOUS at 12:08

## 2024-08-29 NOTE — ED PROVIDER NOTES
SCRIBE #1 NOTE: I, Aparna Hi, am scribing for, and in the presence of, Joel Avilez MD. I have scribed the entire note.       History     Chief Complaint   Patient presents with    Chest Pain     Chest tightness with cough that began yesterday    Abdominal Pain     Low abd pain since last night    Generalized Body Aches     Started yesterday     Review of patient's allergies indicates:  No Known Allergies      History of Present Illness     HPI    8/29/2024, 10:54 AM  History obtained from the patient      History of Present Illness: Jacqueline Luna is a 68 y.o. female patient with a PMHx of COPD, HTN, anxiety, depression, hypercholesteremia, and DM Type II who presents to the Emergency Department for evaluation of generalized illness which onset gradually within the last 24 hours. Pt states that yesterday she went to the Social Security office and upon returning to her car she began feeling sick, thinking she had covid. Pt notes she had a hysterectomy a couple months ago. Symptoms are constant and moderate in severity. No mitigating or exacerbating factors reported. Associated sxs include body aches, congestion, runny nose, sore throat, constipation, and nausea. Patient denies any SOB, vomiting, headaches, light-headedness, and all other sxs at this time. No prior Tx. Pt denies taking her usual medications this morning. No further complaints or concerns at this time.       Arrival mode: Personal vehicle      PCP: No, Primary Doctor        Past Medical History:  Past Medical History:   Diagnosis Date    Anxiety disorder, unspecified     COPD (chronic obstructive pulmonary disease)     Depression     Family history of malignant neoplasm of breast 8/8/2023    Fibrocystic breast     HTN (hypertension)     Hypercholesteremia     Mass of multiple sites of left breast 08/07/2023    Mass of multiple sites of right breast 08/07/2023    Mastodynia 8/8/2023    Mild intermittent asthma, uncomplicated     Type 2  diabetes mellitus without complications        Past Surgical History:  Past Surgical History:   Procedure Laterality Date    CLOSED REDUCTION, FINGER, WITH PINNING Right 8/11/2023    Procedure: CLOSED REDUCTION, FINGER, WITH PINNING;  Surgeon: Nawaf Mcclure MD;  Location: [facility] OR;  Service: Orthopedics;  Laterality: Right;  closed reduction and pinning right thumb proximal phalanx fracture    CORONARY ANGIOGRAPHY N/A 3/12/2021    Procedure: ANGIOGRAM, CORONARY ARTERY;  Surgeon: Shruti Harrison MD;  Location: [facility] CATH LAB;  Service: Cardiology;  Laterality: N/A;    CORONARY ANGIOPLASTY WITH STENT PLACEMENT N/A 3/12/2021    Procedure: Angioplasty, Coronary Artery, With Stent Insertion;  Surgeon: Shruti Harrison MD;  Location: [facility] CATH LAB;  Service: Cardiology;  Laterality: N/A;    LEFT HEART CATHETERIZATION Left 3/12/2021    Procedure: CATHETERIZATION, HEART, LEFT;  Surgeon: Shruti Harrison MD;  Location: [facility] CATH LAB;  Service: Cardiology;  Laterality: Left;         Family History:  Family History   Problem Relation Name Age of Onset    Diabetes Mother      Hypertension Mother      Cancer Mother      Cancer Father      COPD Father      Breast cancer Sister          dx 40's    Breast cancer Sister  74    Breast cancer Maternal Aunt      Breast cancer Maternal Aunt      Ovarian cancer Neg Hx         Social History:  Social History     Tobacco Use    Smoking status: Former     Current packs/day: 0.00     Average packs/day: 0.5 packs/day for 35.2 years (17.6 ttl pk-yrs)     Types: Cigarettes     Start date: 1/1/1986     Quit date: 3/5/2021     Years since quitting: 3.4    Smokeless tobacco: Not on file   Substance and Sexual Activity    Alcohol use: Not Currently    Drug use: Never    Sexual activity: Not Currently     Birth control/protection: Abstinence        Review of Systems     Review of Systems   Constitutional:  Positive for fever.   HENT:  Positive for congestion, rhinorrhea and sore throat.     Respiratory:  Positive for cough. Negative for shortness of breath.    Cardiovascular:  Negative for chest pain.   Gastrointestinal:  Positive for constipation and nausea. Negative for vomiting.        (+) lower abd cramping   Genitourinary:  Negative for dysuria.   Musculoskeletal:  Positive for myalgias. Negative for back pain.   Skin:  Negative for rash.   Neurological:  Negative for weakness, light-headedness and headaches.   Hematological:  Does not bruise/bleed easily.   All other systems reviewed and are negative.       Physical Exam     Initial Vitals [08/29/24 1009]   BP Pulse Resp Temp SpO2   (!) 206/98 108 20 (!) 102.1 °F (38.9 °C) 96 %      MAP       --          Physical Exam  Nursing Notes and Vital Signs Reviewed.  Constitutional: Patient is in no acute distress. Well-developed and well-nourished.  Head: Atraumatic. Normocephalic.  Eyes: PERRL. EOM intact. Conjunctivae are not pale. No scleral icterus.  ENT: Mucous membranes are moist. Nasal congestion and rhinorrhea.  Neck: Supple. Full ROM. No lymphadenopathy.  Cardiovascular: No murmurs, rubs, or gallops. Distal pulses are 2+ and symmetric.  Pulmonary/Chest: No respiratory distress. Clear to auscultation bilaterally. No wheezing or rales.  Abdominal: Soft and non-distended. No rebound, guarding, or rigidity. Good bowel sounds. Lower abdominal tenderness.  Genitourinary: No CVA tenderness.  Musculoskeletal: Moves all extremities. No obvious deformities. No edema. No calf tenderness.  Skin: Warm and dry.  Neurological:  Alert, awake, and appropriate.  Normal speech.  No acute focal neurological deficits are appreciated.  Psychiatric: Normal affect. Good eye contact. Appropriate in content.     ED Course   Procedures  ED Vital Signs:  Vitals:    08/29/24 1009 08/29/24 1047 08/29/24 1049 08/29/24 1201   BP: (!) 206/98 (!) 183/88     Pulse: 108  98    Resp: 20  (!) 24 (!) 25   Temp: (!) 102.1 °F (38.9 °C)      TempSrc: Oral      SpO2: 96%  96%   "  Weight: 81.6 kg (180 lb)      Height: 5' 1" (1.549 m)       08/29/24 1203 08/29/24 1236 08/29/24 1335 08/29/24 1338   BP: (!) 184/86  133/68    Pulse: 97  85    Resp:       Temp:  (!) 101.6 °F (38.7 °C)  98.9 °F (37.2 °C)   TempSrc:  Oral  Oral   SpO2: 96%  95%    Weight:       Height:        08/29/24 1424 08/29/24 1631   BP: (!) 146/65 (!) 171/79   Pulse: 85 75   Resp: 18 20   Temp: 98.2 °F (36.8 °C)    TempSrc:     SpO2: 96% 96%   Weight:     Height:         Abnormal Lab Results:  Labs Reviewed   CBC W/ AUTO DIFFERENTIAL - Abnormal       Result Value    WBC 9.95      RBC 5.22      Hemoglobin 13.2      Hematocrit 41.1      MCV 79 (*)     MCH 25.3 (*)     MCHC 32.1      RDW 14.6 (*)     Platelets 235      MPV 10.7      Immature Granulocytes 0.5      Gran # (ANC) 7.5      Immature Grans (Abs) 0.05 (*)     Lymph # 1.4      Mono # 0.8      Eos # 0.1      Baso # 0.02      nRBC 0      Gran % 75.8 (*)     Lymph % 14.3 (*)     Mono % 8.4      Eosinophil % 0.8      Basophil % 0.2      Differential Method Automated     COMPREHENSIVE METABOLIC PANEL - Abnormal    Sodium 138      Potassium 3.1 (*)     Chloride 103      CO2 23      Glucose 109      BUN 9      Creatinine 0.8      Calcium 9.2      Total Protein 7.4      Albumin 3.4 (*)     Total Bilirubin 0.3      Alkaline Phosphatase 115      AST 19      ALT 15      eGFR >60      Anion Gap 12     INFLUENZA A & B BY MOLECULAR    Influenza A, Molecular Negative      Influenza B, Molecular Negative      Flu A & B Source Nasal swab     CULTURE, BLOOD   CULTURE, BLOOD   TROPONIN I    Troponin I <0.006     TROPONIN I    Troponin I <0.006     B-TYPE NATRIURETIC PEPTIDE    BNP 30     URINALYSIS, REFLEX TO URINE CULTURE    Specimen UA Urine, Clean Catch      Color, UA Yellow      Appearance, UA Clear      pH, UA 6.0      Specific Gravity, UA 1.020      Protein, UA Negative      Glucose, UA Negative      Ketones, UA Negative      Bilirubin (UA) Negative      Occult Blood UA Negative   "    Nitrite, UA Negative      Urobilinogen, UA Negative      Leukocytes, UA Negative      Narrative:     Specimen Source->Urine   SARS-COV-2 RNA AMPLIFICATION, QUAL    SARS-CoV-2 RNA, Amplification, Qual Negative          All Lab Results:  Results for orders placed or performed during the hospital encounter of 08/29/24   Influenza A & B by Molecular    Specimen: Nasopharyngeal Swab   Result Value Ref Range    Influenza A, Molecular Negative Negative    Influenza B, Molecular Negative Negative    Flu A & B Source Nasal swab    CBC auto differential   Result Value Ref Range    WBC 9.95 3.90 - 12.70 K/uL    RBC 5.22 4.00 - 5.40 M/uL    Hemoglobin 13.2 12.0 - 16.0 g/dL    Hematocrit 41.1 37.0 - 48.5 %    MCV 79 (L) 82 - 98 fL    MCH 25.3 (L) 27.0 - 31.0 pg    MCHC 32.1 32.0 - 36.0 g/dL    RDW 14.6 (H) 11.5 - 14.5 %    Platelets 235 150 - 450 K/uL    MPV 10.7 9.2 - 12.9 fL    Immature Granulocytes 0.5 0.0 - 0.5 %    Gran # (ANC) 7.5 1.8 - 7.7 K/uL    Immature Grans (Abs) 0.05 (H) 0.00 - 0.04 K/uL    Lymph # 1.4 1.0 - 4.8 K/uL    Mono # 0.8 0.3 - 1.0 K/uL    Eos # 0.1 0.0 - 0.5 K/uL    Baso # 0.02 0.00 - 0.20 K/uL    nRBC 0 0 /100 WBC    Gran % 75.8 (H) 38.0 - 73.0 %    Lymph % 14.3 (L) 18.0 - 48.0 %    Mono % 8.4 4.0 - 15.0 %    Eosinophil % 0.8 0.0 - 8.0 %    Basophil % 0.2 0.0 - 1.9 %    Differential Method Automated    Comprehensive metabolic panel   Result Value Ref Range    Sodium 138 136 - 145 mmol/L    Potassium 3.1 (L) 3.5 - 5.1 mmol/L    Chloride 103 95 - 110 mmol/L    CO2 23 23 - 29 mmol/L    Glucose 109 70 - 110 mg/dL    BUN 9 8 - 23 mg/dL    Creatinine 0.8 0.5 - 1.4 mg/dL    Calcium 9.2 8.7 - 10.5 mg/dL    Total Protein 7.4 6.0 - 8.4 g/dL    Albumin 3.4 (L) 3.5 - 5.2 g/dL    Total Bilirubin 0.3 0.1 - 1.0 mg/dL    Alkaline Phosphatase 115 55 - 135 U/L    AST 19 10 - 40 U/L    ALT 15 10 - 44 U/L    eGFR >60 >60 mL/min/1.73 m^2    Anion Gap 12 8 - 16 mmol/L   Troponin I #1   Result Value Ref Range    Troponin I  <0.006 0.000 - 0.026 ng/mL   Troponin I #2   Result Value Ref Range    Troponin I <0.006 0.000 - 0.026 ng/mL   BNP   Result Value Ref Range    BNP 30 0 - 99 pg/mL   Urinalysis, Reflex to Urine Culture Urine, Clean Catch    Specimen: Urine, Clean Catch   Result Value Ref Range    Specimen UA Urine, Clean Catch     Color, UA Yellow Yellow, Straw, Judith    Appearance, UA Clear Clear    pH, UA 6.0 5.0 - 8.0    Specific Gravity, UA 1.020 1.005 - 1.030    Protein, UA Negative Negative    Glucose, UA Negative Negative    Ketones, UA Negative Negative    Bilirubin (UA) Negative Negative    Occult Blood UA Negative Negative    Nitrite, UA Negative Negative    Urobilinogen, UA Negative <2.0 EU/dL    Leukocytes, UA Negative Negative   COVID-19 Rapid Screening   Result Value Ref Range    SARS-CoV-2 RNA, Amplification, Qual Negative Negative   EKG 12-lead   Result Value Ref Range    QRS Duration 76 ms    [facility] QTC Calculation 475 ms         Imaging Results:  Imaging Results              CT Chest Without Contrast (Final result)  Result time 08/29/24 15:38:04      Final result by Germán WheelerHowieMD barbara (08/29/24 15:38:04)                   Impression:      Left lower lobe pneumonia.      Electronically signed by: Germán Wheeler MD  Date:    08/29/2024  Time:    15:38               Narrative:    EXAMINATION:  CT CHEST WITHOUT CONTRAST    CLINICAL HISTORY:  questionable left lower lobe pneumonia;    TECHNIQUE:  Standard noncontrast CT of the chest.  All CT scans at this facility use dose modulation, iterative reconstruction and/or weight based dosing when appropriate to reduce radiation dose to as low as reasonably achievable.    COMPARISON:  Chest x-ray, 08/29/2024 and CT chest 04/03/2023    FINDINGS:  Patchy nodular infiltrate involving the left lower lobe.  Elsewhere the lungs are essentially clear.    No pleural effusion.  No pneumothorax.    Mildly prominent right paratracheal lymph node measuring 1.3 cm.  The  atherosclerosis of thoracic aorta.  Coronary artery calcifications.  Normal size heart.  Small pericardial effusion.    No significant osseous abnormality.                                       US Pelvis Comp with Transvag NON-OB (xpd (Final result)  Result time 08/29/24 14:34:11      Final result by Salomón Gomez MD (08/29/24 14:34:11)                   Impression:      No significant abnormalities.      Electronically signed by: Salomón Gomez MD  Date:    08/29/2024  Time:    14:34               Narrative:    EXAMINATION:  US PELVIS COMP WITH TRANSVAG NON-OB (XPD)    CLINICAL HISTORY:  right sided inflammatory change seen on CT; patient febrile;    TECHNIQUE:  Transabdominal and transvaginal pelvic ultrasound performed.    COMPARISON:  CT same date of service    FINDINGS:  Uterus and ovaries are not seen.  No gross abnormalities or focal fluid collections are demonstrated.  No definite retained tubular structures.    There is no free pelvic fluid.                                       CT Abdomen Pelvis With IV Contrast NO Oral Contrast (Final result)  Result time 08/29/24 13:15:56      Final result by Salomón Gomez MD (08/29/24 13:15:56)                   Impression:      Stranding seen within the right hemipelvis which is clearly separate from the appendix and is of unknown clinical significance can could be related to the right vaginal cuff for right tube.  Findings are seen from images 118 through 132 pelvic ultrasound could be obtained as clinically warranted.    Patchy infiltrate seen within the left lower lobe with some soft tissue component measuring approximately 2.1 x 1.6 cm sequence 2 image 17.  Findings could reflect focal pneumonitis/pneumonia/aspiration. Recommend follow-up CT chest after acute exacerbation to exclude underlying neoplasm.    All CT scans at this facility use dose modulation, iterative reconstruction, and/or weight based dosing when appropriate to reduce radiation dose to as  low as reasonable achievable.      Electronically signed by: Salomón Gomez MD  Date:    08/29/2024  Time:    13:15               Narrative:    EXAMINATION:  CT ABDOMEN PELVIS WITH IV CONTRAST    CLINICAL HISTORY:  lower abdominal pain, fever, tender in lower abdomen;    TECHNIQUE:  Low dose axial images, sagittal and coronal reformations were obtained from the lung bases to the pubic symphysis following the IV administration of 100 mL of Omnipaque 350.  Oral contrast was not administered.    COMPARISON:  04/29/2024    FINDINGS:  Heart: Normal size.  Trace effusion.    Lung Bases: Patchy infiltrate seen within the left lower lobe with some soft tissue component measuring approximately 2.1 x 1.6 cm sequence 2 image 17.  Findings could reflect focal pneumonitis/pneumonia/aspiration.  Recommend follow-up CT chest after acute exacerbation to exclude underlying neoplasm.    Liver: Normal size and attenuation. No focal lesions.    Gallbladder: No calcified gallstones.    Bile Ducts: No dilatation.    Pancreas: No mass. No peripancreatic fat stranding.    Spleen: Normal.    Adrenals: Normal.    Kidneys/Ureters: Normal enhancement.  Bilateral renal cortical thinning, right greater than left.  No mass or  hydroureteronephrosis.    Bladder: No wall thickening.    Reproductive organs: Post hysterectomy    GI Tract/Mesentery: No evidence of bowel obstruction or inflammation.  Normal appendix is noted.    Stranding seen within the right hemipelvis which is clearly separate from the appendix and is of unknown clinical significance can could be related to the right vaginal cuff for right tube.  Findings are seen from images 118 through 132 pelvic ultrasound could be obtained as clinically warranted.    Peritoneal Space: No ascites or free air.    Retroperitoneum: No significant adenopathy.    Abdominal wall: Normal.    Vasculature: No aneurysm.  Dense atherosclerotic disease    Bones: No acute fracture. No suspicious lytic or  sclerotic lesions.                                       X-Ray Chest AP Portable (Final result)  Result time 08/29/24 10:51:09      Final result by Salomón Gomez MD (08/29/24 10:51:09)                   Impression:      No acute process seen.      Electronically signed by: Salomón Gomez MD  Date:    08/29/2024  Time:    10:51               Narrative:    EXAMINATION:  XR CHEST AP PORTABLE    CLINICAL HISTORY:  Chest Pain;    FINDINGS:  Single view of the chest.  Comparison 05/03/2024.    Cardiac silhouette is normal.  Mild atelectasis at the lung bases.  The lungs demonstrate no evidence of active disease.  No evidence of pleural effusion or pneumothorax.  Bones appear intact.  Moderate degenerative changes and moderate atherosclerotic disease.                                       The EKG was ordered, reviewed, and independently interpreted by the ED provider.  Interpretation time: 10:08  Rate: 112 BPM  Rhythm: sinus tachycardia  Interpretation: Minimal voltage criteria for LVH, may be normal variant (R in aVL). Inferior infarct, age undetermined. Anterolateral infarct, age undetermined. No STEMI.             The Emergency Provider reviewed the vital signs and test results, which are outlined above.     ED Discussion       4:23 PM: Reassessed pt at this time.  Discussed with pt all pertinent ED information and results. Discussed pt dx and plan of tx. Gave pt all f/u and return to the ED instructions. All questions and concerns were addressed at this time. Pt expresses understanding of information and instructions, and is comfortable with plan to discharge. Pt is stable for discharge.    I discussed with patient and/or family/caretaker that evaluation in the ED does not suggest any emergent or life threatening medical conditions requiring immediate intervention beyond what was provided in the ED, and I believe patient is safe for discharge.  Regardless, an unremarkable evaluation in the ED does not preclude the  development or presence of a serious of life threatening condition. As such, patient was instructed to return immediately for any worsening or change in current symptoms.     ED Course as of 08/29/24 1658   Thu Aug 29, 2024   1043 X-Ray Chest AP Portable  Duration of the patient's x-ray demonstrates questionable RLL infiltrate. Otherwise, no acute focal consolidation, effusion, or pneumothorax.   [MC]   1204 WBC: 9.95 []   1204 Hemoglobin: 13.2 []   1204 COVID-19 Rapid Screening []   1204 Influenza A & B by Molecular []   1331 Leukocyte Esterase, UA: Negative []   1331 NITRITE UA: Negative []   1342 Temp: 98.9 °F (37.2 °C) []   1603 Patient found to have left lower lobe pneumonia.  PSI score is 58 points, putting her at risk class 2, were outpatient treatment is reasonable.  This patient has a good follow up system, a daughter that lives with her at home, and SpO2 monitor at home, in a way to get her medications filled.  This makes her a good candidate for outpatient management. [MC]      ED Course User Index  [MC] Joel Avilez MD     Medical Decision Making  DDX: MI, CAD, PUlmonary disease, PE, AAA, Pneumonia, Costochondritis, PTX, Liver disease, Pancreatitis, intra-abdominal infection, among others    The patient presents to the emergency department as documented.  She initially had a fever and an elevated blood pressure, though she did not take her medication this morning.  She was slightly tachycardic on arrival, however this decreased throughout her ER stay with fluids.  Repeat reperfusion exam performed after fluid bolus demonstrates a normal temperature, normal heart rate, and very well-appearing patient with strong pulses.  An extensive workup was performed to try and track down the source of her fever.  Initially, there was some concern about an area of inflammation in the pelvic region, which could have been from her hysterectomy, however an ultrasound saw no abnormalities.  The same CT  scan of the abdomen and pelvis showed a questionable infiltrate in the left lower lung, so a CT chest was performed which demonstrates a left pneumonia.  Patient is low risk on the PSI score, in his a candidate for outpatient management.  Patient given antibiotics to cover for community-acquired pneumonia here in the emergency department, we will be discharged home on Augmentin and doxycycline for continued coverage.  We had a long discussion regarding the patient's treatment plan, all return precautions, and patient understands and agrees with the plan.    Patient was counseled on elevated blood pressure and medication compliance.      Amount and/or Complexity of Data Reviewed  External Data Reviewed: notes.     Details: Reviewed a note on 05/03/2024 with pulmonology documenting the patient's history of COPD.  This note provided insight into the patient's pulmonary health, and contributed to my medical decision-making today  Labs: ordered. Decision-making details documented in ED Course.  Radiology: ordered and independent interpretation performed. Decision-making details documented in ED Course.  ECG/medicine tests: ordered and independent interpretation performed. Decision-making details documented in ED Course.    Risk  OTC drugs.  Prescription drug management.  Decision regarding hospitalization.  Risk Details: I did consider hospital admission for this patient, use the pneumonia severity index to help guide decision-making at time of discharge, places the candidate in the outpatient treatment category.                ED Medication(s):  Medications   cefTRIAXone (ROCEPHIN) 2 g in D5W 100 mL IVPB (MB+) (has no administration in time range)   doxycycline 100 mg in D5W 100 mL IVPB (MB+) (100 mg Intravenous New Bag 8/29/24 1649)   lactated ringers bolus 1,000 mL (0 mLs Intravenous Stopped 8/29/24 1233)   acetaminophen tablet 1,000 mg (1,000 mg Oral Given 8/29/24 1145)   ondansetron injection 4 mg (4 mg Intravenous  Given 8/29/24 1320)   iohexoL (OMNIPAQUE 350) injection 100 mL (100 mLs Intravenous Given 8/29/24 1247)   piperacillin-tazobactam (ZOSYN) 4.5 g in D5W 100 mL IVPB (MB+) (0 g Intravenous Stopped 8/29/24 1453)       New Prescriptions    AMOXICILLIN-CLAVULANATE 875-125MG (AUGMENTIN) 875-125 MG PER TABLET    Take 1 tablet by mouth 2 (two) times daily.    DOXYCYCLINE (VIBRAMYCIN) 100 MG CAP    Take 1 capsule (100 mg total) by mouth 2 (two) times daily. for 10 days               Scribe Attestation:   Scribe #1: I performed the above scribed service and the documentation accurately describes the services I performed. I attest to the accuracy of the note.     Attending:   Physician Attestation Statement for Scribe #1: I, Joel Avilez MD, personally performed the services described in this documentation, as scribed by Aparna Hi, in my presence, and it is both accurate and complete.           Clinical Impression       ICD-10-CM ICD-9-CM   1. Pneumonia of left lower lobe due to infectious organism  J18.9 486   2. Chest pain  R07.9 786.50   3. Fever, unspecified fever cause  R50.9 780.60       Disposition:   Disposition: Discharged  Condition: Stable         Joel Avilez MD  08/29/24 2373

## 2024-08-29 NOTE — FIRST PROVIDER EVALUATION
Medical screening examination initiated.  I have conducted a focused provider triage encounter, findings are as follows:    Brief history of present illness:  Chest pain that began yesterday    There were no vitals filed for this visit.    Pertinent physical exam:  No acute distress, patient alert and oriented    Brief workup plan:  Cardiac workup    Preliminary workup initiated; this workup will be continued and followed by the physician or advanced practice provider that is assigned to the patient when roomed.

## 2024-08-29 NOTE — DISCHARGE INSTRUCTIONS
Please follow-up with your PCP. Please call on the next business day to schedule this appointment.    Take the Augmentin and Doxycycline as prescribed for your pneumonia. Please understand that all medications have potential side effects. Please read the package insert for all medications that we have prescribed/recommended. Please call your primary care physician or return to the ER with any questions or concerns you may have. Please take only the dosage that is prescribed.    As we discussed, please return to the ER with any worsening shortness of breath, chest pain, low oxygen readings at home, weakness, or any other concerns.

## 2024-09-04 LAB
BACTERIA BLD CULT: NORMAL
BACTERIA BLD CULT: NORMAL

## 2024-09-19 ENCOUNTER — OFFICE VISIT (OUTPATIENT)
Dept: CARDIOLOGY | Facility: CLINIC | Age: 69
End: 2024-09-19
Payer: MEDICARE

## 2024-09-19 VITALS
OXYGEN SATURATION: 98 % | WEIGHT: 181 LBS | HEART RATE: 78 BPM | SYSTOLIC BLOOD PRESSURE: 140 MMHG | BODY MASS INDEX: 34.2 KG/M2 | DIASTOLIC BLOOD PRESSURE: 84 MMHG

## 2024-09-19 DIAGNOSIS — G47.30 SLEEP APNEA, UNSPECIFIED TYPE: ICD-10-CM

## 2024-09-19 DIAGNOSIS — I11.0 HYPERTENSIVE HEART DISEASE WITH HEART FAILURE: Primary | ICD-10-CM

## 2024-09-19 DIAGNOSIS — Z95.5 S/P CORONARY ARTERY STENT PLACEMENT: ICD-10-CM

## 2024-09-19 DIAGNOSIS — I25.10 ATHEROSCLEROSIS OF NATIVE CORONARY ARTERY OF NATIVE HEART WITHOUT ANGINA PECTORIS: ICD-10-CM

## 2024-09-19 DIAGNOSIS — E78.2 MIXED HYPERLIPIDEMIA: ICD-10-CM

## 2024-09-19 DIAGNOSIS — I73.9 CLAUDICATION: ICD-10-CM

## 2024-09-19 PROBLEM — I25.119 ATHEROSCLEROSIS OF NATIVE CORONARY ARTERY OF NATIVE HEART WITH ANGINA PECTORIS: Status: ACTIVE | Noted: 2024-09-19

## 2024-09-19 PROCEDURE — 99999 PR PBB SHADOW E&M-EST. PATIENT-LVL IV: CPT | Mod: PBBFAC,,, | Performed by: INTERNAL MEDICINE

## 2024-09-19 RX ORDER — METOPROLOL SUCCINATE 100 MG/1
100 TABLET, EXTENDED RELEASE ORAL DAILY
Qty: 30 TABLET | Refills: 11 | Status: SHIPPED | OUTPATIENT
Start: 2024-09-19

## 2024-09-19 NOTE — PROGRESS NOTES
Subjective:   Patient ID:  Jacqueline Luna is a 68 y.o. female who presents for evaluation of Shortness of Breath, Follow-up, and Fatigue        Shortness of Breath  Pertinent negatives include no chest pain or syncope.   Follow-up  Associated symptoms include fatigue. Pertinent negatives include no chest pain.   Fatigue  Associated symptoms include fatigue. Pertinent negatives include no chest pain.    September 19, 2024.    Comes in for six-month follow-up.    No angina.  No limiting LANDEROS.  No syncope or presyncope     March 19, 2024.    Comes in for six-month follow-up.    Chest pain.  No unusual dyspnea on exertion.  No lower extremity swelling.    She has infrequent seldom isolated skipped beats.    Sinus on exam today.    Compliant medications.  No bleeding.      8.9.2023  COMES IN FOR A 1 YEAR FOLLOW-UP.    Going for a hand surgery.    She is doing well denies any chest pain.  Her functional status is fair acceptable.  She does all her chores.    Denies any dyspnea lower extremity swelling palpitations syncope or presyncope.    She had her stent 2 years ago.  With nonobstructive disease on the left.      9.1.2022 this is a virtual visit.    Patient has multiple noncardiac complaints.  She complains of stiffness to her and and joints.    She denies any chest pain or dyspnea on exertion.    She complains of fatigue as well.    There is no lower extremity swelling.  Blood pressure under control.    Compliant with medications.    No bleeding.  She is still taking aspirin and Plavix since her stent.  Will continue to aspirin 81 mg and stop the Plavix  Her LINA was normal at rest but with exercise mildly decreased however ultrasound lower extremity was normal.  His CTA a runoff showed moderate disease in the 50% range iliac and SFA is.  A follow-up was made with the patient she denies any more symptoms to her left thigh.    2.8.2022   cw toprol plavix asa, lasix, statin   Has mild james did not follow with pulm    Did not get pft done, states has wheezing , pacheco   Left upper thigh claudication , not better when she leans on the cart   Also has back pain, going for xray lumbar spine   Also states was told her blood count was elevated and underwent evaluation at a cancer center but was told no cancer , KENNETH WHITESIDE NP   No chest pain       4.2021  64 yo female with pmhx below , not a smoker  S/p recent discharge two weeks ago she presented with an inferior stemi after 3 days of intermittent chest pains   No more chest pain since discharge, no groin issues   Reports Dyspnea at exertion which she relates to her copd , but states that sometimes she gets dyspnea at rest and believes this is new compared to before , ? 2/2 brilinta    Few wheezing episodes, uses albuterol PRN    Reports left leg claudication from thigh to calf with minimal walking   Past Medical History:   Diagnosis Date    Anxiety disorder, unspecified     COPD (chronic obstructive pulmonary disease)     Depression     Family history of malignant neoplasm of breast 08/08/2023    Fibrocystic breast     HTN (hypertension)     Hypercholesteremia     Mass of multiple sites of left breast 08/07/2023    Mass of multiple sites of right breast 08/07/2023    Mastodynia 08/08/2023    Mild intermittent asthma, uncomplicated     Pneumonia, unspecified organism     Type 2 diabetes mellitus without complications        Past Surgical History:   Procedure Laterality Date    CLOSED REDUCTION, FINGER, WITH PINNING Right 8/11/2023    Procedure: CLOSED REDUCTION, FINGER, WITH PINNING;  Surgeon: Nawaf Mcclure MD;  Location: [facility] OR;  Service: Orthopedics;  Laterality: Right;  closed reduction and pinning right thumb proximal phalanx fracture    CORONARY ANGIOGRAPHY N/A 3/12/2021    Procedure: ANGIOGRAM, CORONARY ARTERY;  Surgeon: Shruti Harrison MD;  Location: [facility] CATH LAB;  Service: Cardiology;  Laterality: N/A;    CORONARY ANGIOPLASTY WITH STENT PLACEMENT N/A 3/12/2021     Procedure: Angioplasty, Coronary Artery, With Stent Insertion;  Surgeon: Shruti Harrison MD;  Location: [facility] CATH LAB;  Service: Cardiology;  Laterality: N/A;    LEFT HEART CATHETERIZATION Left 3/12/2021    Procedure: CATHETERIZATION, HEART, LEFT;  Surgeon: Shruti Harrison MD;  Location: [facility] CATH LAB;  Service: Cardiology;  Laterality: Left;       Social History     Tobacco Use    Smoking status: Former     Current packs/day: 0.00     Average packs/day: 0.5 packs/day for 35.2 years (17.6 ttl pk-yrs)     Types: Cigarettes     Start date: 1/1/1986     Quit date: 3/5/2021     Years since quitting: 3.5   Substance Use Topics    Alcohol use: Not Currently    Drug use: Never       Family History   Problem Relation Name Age of Onset    Diabetes Mother      Hypertension Mother      Cancer Mother      Cancer Father      COPD Father      Breast cancer Sister          dx 40's    Breast cancer Sister  74    Breast cancer Maternal Aunt      Breast cancer Maternal Aunt      Ovarian cancer Neg Hx         Review of Systems   Constitutional: Positive for fatigue.   Cardiovascular:  Negative for chest pain, dyspnea on exertion, palpitations and syncope.   Respiratory:  Positive for shortness of breath.    Genitourinary: Negative.    Neurological: Negative.      Physical Exam  Vitals reviewed.   Constitutional:       Appearance: She is well-developed.   Neck:      Vascular: No carotid bruit.   Cardiovascular:      Rate and Rhythm: Normal rate and regular rhythm.      Pulses: Intact distal pulses.      Heart sounds: Normal heart sounds. No murmur heard.  Pulmonary:      Breath sounds: Normal breath sounds.   Neurological:      Mental Status: She is oriented to person, place, and time.         Current Outpatient Medications on File Prior to Visit   Medication Sig    albuterol (PROVENTIL/VENTOLIN HFA) 90 mcg/actuation inhaler INHALE 2 PUFFS INTO THE LUNGS EVERY 4 (FOUR) HOURS AS NEEDED FOR WHEEZING OR SHORTNESS OF BREATH. RESCUE     amLODIPine (NORVASC) 10 MG tablet Take 10 mg by mouth once daily.    amoxicillin-clavulanate 875-125mg (AUGMENTIN) 875-125 mg per tablet Take 1 tablet by mouth 2 (two) times daily.    cholecalciferol, vitamin D3, 1,250 mcg (50,000 unit) capsule Take 50,000 Units by mouth every 7 days.    clopidogreL (PLAVIX) 75 mg tablet TAKE 1 TABLET EVERY DAY    ezetimibe (ZETIA) 10 mg tablet Take 10 mg by mouth once daily.    fluticasone-salmeterol diskus inhaler 250-50 mcg INHALE 1 PUFF INTO THE LUNGS 2 (TWO) TIMES DAILY. WASH OUT MOUTH AFTER USE.    gabapentin (NEURONTIN) 300 MG capsule Take 300 mg by mouth.    lisinopriL-hydrochlorothiazide (PRINZIDE,ZESTORETIC) 20-12.5 mg per tablet Take 1 tablet by mouth 2 (two) times a day.    metFORMIN (GLUCOPHAGE-XR) 500 MG ER 24hr tablet metformin  mg tablet,extended release 24 hr    ondansetron (ZOFRAN) 4 MG tablet Take 1 tablet (4 mg total) by mouth every 6 (six) hours as needed for Nausea.    pantoprazole (PROTONIX) 40 MG tablet Take 40 mg by mouth once daily.    sertraline (ZOLOFT) 100 MG tablet Take 100 mg by mouth once daily.    TRUE METRIX GLUCOSE TEST STRIP Strp SMARTSIG:Via Meter    TRUEPLUS LANCETS 33 gauge Misc     XIFAXAN 550 mg Tab Take 550 mg by mouth 3 (three) times daily.    [DISCONTINUED] metoprolol succinate (TOPROL-XL) 50 MG 24 hr tablet Take 1 tablet (50 mg total) by mouth once daily.    atorvastatin (LIPITOR) 80 MG tablet Take 1 tablet (80 mg total) by mouth every evening. (Patient taking differently: Take 80 mg by mouth once daily.)    furosemide (LASIX) 20 MG tablet Take 1 tablet (20 mg total) by mouth daily as needed (swelling).     Current Facility-Administered Medications on File Prior to Visit   Medication    chlorhexidine 0.12 % solution 10 mL       Objective:   Objective:  Wt Readings from Last 3 Encounters:   09/19/24 82.1 kg (181 lb)   08/29/24 81.6 kg (180 lb)   05/03/24 82.3 kg (181 lb 7 oz)     Temp Readings from Last 3 Encounters:   08/29/24 98  "°F (36.7 °C)   04/29/24 98.7 °F (37.1 °C)   04/19/24 99.9 °F (37.7 °C) (Oral)     BP Readings from Last 3 Encounters:   09/19/24 (!) 140/84   08/29/24 (!) 172/80   05/03/24 130/72     Pulse Readings from Last 3 Encounters:   09/19/24 78   08/29/24 84   05/03/24 63           Lab Results   Component Value Date    CHOL 130 05/25/2023    CHOL 193 11/28/2022    CHOL 177 09/01/2022     Lab Results   Component Value Date    HDL 37 (L) 05/25/2023    HDL 34 (L) 11/28/2022    HDL 40 09/01/2022     Lab Results   Component Value Date    LDLCALC 68 05/25/2023    LDLCALC 115 (H) 11/28/2022    LDLCALC 101 (H) 09/01/2022     Lab Results   Component Value Date    TRIG 146 05/25/2023    TRIG 249 (H) 11/28/2022    TRIG 210 (H) 09/01/2022     Lab Results   Component Value Date    CHOLHDL 21.7 03/13/2021       Chemistry        Component Value Date/Time     08/29/2024 1115    K 3.1 (L) 08/29/2024 1115     08/29/2024 1115    CO2 23 08/29/2024 1115    BUN 9 08/29/2024 1115    CREATININE 0.8 08/29/2024 1115     08/29/2024 1115        Component Value Date/Time    CALCIUM 9.2 08/29/2024 1115    ALKPHOS 115 08/29/2024 1115    AST 19 08/29/2024 1115    ALT 15 08/29/2024 1115    BILITOT 0.3 08/29/2024 1115    ESTGFRAFRICA >60 03/01/2022 1550    EGFRNONAA >60 03/01/2022 1550          Lab Results   Component Value Date    TSH 2.530 02/16/2022     No results found for: "INR", "PROTIME"  Lab Results   Component Value Date    WBC 9.95 08/29/2024    HGB 13.2 08/29/2024    HCT 41.1 08/29/2024    MCV 79 (L) 08/29/2024     08/29/2024     BNP  @LABRCNTIP(BNP,BNPTRIAGEBLO)@  CrCl cannot be calculated (Patient's most recent lab result is older than the maximum 7 days allowed.).     Imaging:  ======  Results for orders placed during the hospital encounter of 03/12/21    Echo Color Flow Doppler? Yes    Interpretation Summary  · Concentric hypertrophy and normal systolic function. The estimated ejection fraction is 55%  · Mild " tricuspid regurgitation.  · Normal left ventricular diastolic function.  · There are segmental left ventricular wall motion abnormalities.  · With normal right ventricular systolic function.  · Normal central venous pressure (3 mmHg).  · The estimated PA systolic pressure is 21 mmHg.    No results found for this or any previous visit.    No results found for this or any previous visit.    No results found for this or any previous visit.    No results found for this or any previous visit.    No valid procedures specified.    Diagnostic Results:  ECG: Reviewed    3/13/2021   Description of the findings of the procedure:   Left main patent   Lad patent, d1 patent   circ with 30% proximal , om1 intermediate   Ramus is ostially 40%   rca is 100% thrombotic, on top of a diffusely diseased vessel      Plan   S/p successful PCI, 3.5x28 mm JOSE DAVID , post dilated to 4.0 mid stent   brilinta received 180 mg   Asa   lipitor       The ASCVD Risk score (Mac RAY, et al., 2019) failed to calculate for the following reasons:    The patient has a prior MI or stroke diagnosis    Assessment and Plan:   Hypertensive heart disease with heart failure    Atherosclerosis of native coronary artery of native heart without angina pectoris  -     CV Ultrasound Bilateral Doppler Carotid; Future    Claudication    S/P coronary artery stent placement  -     metoprolol succinate (TOPROL-XL) 100 MG 24 hr tablet; Take 1 tablet (100 mg total) by mouth once daily.  Dispense: 30 tablet; Refill: 11    Mixed hyperlipidemia    Sleep apnea, unspecified type    BMI 34.0-34.9,adult        Statins , asa , Toprol   Increase Toprol.  Blood pressure not at goal.  EKG unchanged from the past.  Old infarct.  History of PCI to her RCA NSTEMI 2021.    Could not tolerate CPAP.    Continue Plavix.    Reviewed all tests and above medical conditions with patient in detail and formulated treatment plan.  Risk factor modification discussed.   Cardiac low salt diet  discussed.  Maintaining healthy weight and weight loss goals were discussed in clinic.    Follow up in 6 months

## 2024-09-27 ENCOUNTER — HOSPITAL ENCOUNTER (OUTPATIENT)
Dept: CARDIOLOGY | Facility: HOSPITAL | Age: 69
Discharge: HOME | End: 2024-09-27
Attending: INTERNAL MEDICINE
Payer: MEDICARE

## 2024-09-27 VITALS
SYSTOLIC BLOOD PRESSURE: 140 MMHG | DIASTOLIC BLOOD PRESSURE: 84 MMHG | BODY MASS INDEX: 34.17 KG/M2 | WEIGHT: 181 LBS | HEIGHT: 61 IN

## 2024-09-27 DIAGNOSIS — I25.10 ATHEROSCLEROSIS OF NATIVE CORONARY ARTERY OF NATIVE HEART WITHOUT ANGINA PECTORIS: ICD-10-CM

## 2024-09-27 LAB
LEFT ARM DIASTOLIC BLOOD PRESSURE: 84 MMHG
LEFT ARM SYSTOLIC BLOOD PRESSURE: 140 MMHG
LEFT CBA DIAS: 15 CM/S
LEFT CBA SYS: 89 CM/S
LEFT CCA DIST DIAS: 9 CM/S
LEFT CCA DIST SYS: 56 CM/S
LEFT CCA MID DIAS: 11 CM/S
LEFT CCA MID SYS: 72 CM/S
LEFT CCA PROX DIAS: 10 CM/S
LEFT CCA PROX SYS: 83 CM/S
LEFT ECA DIAS: 11 CM/S
LEFT ECA SYS: 140 CM/S
LEFT ICA DIST DIAS: 19 CM/S
LEFT ICA DIST SYS: 81 CM/S
LEFT ICA MID DIAS: 16 CM/S
LEFT ICA MID SYS: 64 CM/S
LEFT ICA PROX DIAS: 12 CM/S
LEFT ICA PROX SYS: 68 CM/S
LEFT VERTEBRAL DIAS: 12 CM/S
LEFT VERTEBRAL SYS: 60 CM/S
OHS CV CAROTID RIGHT ICA EDV HIGHEST: 24
OHS CV CAROTID ULTRASOUND LEFT ICA/CCA RATIO: 1.45
OHS CV CAROTID ULTRASOUND RIGHT ICA/CCA RATIO: 2.44
OHS CV PV CAROTID LEFT HIGHEST CCA: 83
OHS CV PV CAROTID LEFT HIGHEST ICA: 81
OHS CV PV CAROTID RIGHT HIGHEST CCA: 65
OHS CV PV CAROTID RIGHT HIGHEST ICA: 149
OHS CV US CAROTID LEFT HIGHEST EDV: 19
RIGHT ARM DIASTOLIC BLOOD PRESSURE: 84 MMHG
RIGHT ARM SYSTOLIC BLOOD PRESSURE: 140 MMHG
RIGHT CBA DIAS: 26 CM/S
RIGHT CBA SYS: 99 CM/S
RIGHT CCA DIST DIAS: 11 CM/S
RIGHT CCA DIST SYS: 61 CM/S
RIGHT CCA MID DIAS: 9 CM/S
RIGHT CCA MID SYS: 65 CM/S
RIGHT CCA PROX DIAS: 7 CM/S
RIGHT CCA PROX SYS: 58 CM/S
RIGHT ECA DIAS: 6 CM/S
RIGHT ECA SYS: 146 CM/S
RIGHT ICA DIST DIAS: 22 CM/S
RIGHT ICA DIST SYS: 149 CM/S
RIGHT ICA MID DIAS: 17 CM/S
RIGHT ICA MID SYS: 108 CM/S
RIGHT ICA PROX DIAS: 24 CM/S
RIGHT ICA PROX SYS: 143 CM/S
RIGHT VERTEBRAL DIAS: 15 CM/S
RIGHT VERTEBRAL SYS: 77 CM/S

## 2024-09-27 PROCEDURE — 93880 EXTRACRANIAL BILAT STUDY: CPT | Mod: 26,,, | Performed by: INTERNAL MEDICINE

## 2024-09-27 PROCEDURE — 93880 EXTRACRANIAL BILAT STUDY: CPT

## 2024-11-06 ENCOUNTER — OFFICE VISIT (OUTPATIENT)
Dept: PULMONOLOGY | Facility: CLINIC | Age: 69
End: 2024-11-06
Payer: MEDICARE

## 2024-11-06 ENCOUNTER — CLINICAL SUPPORT (OUTPATIENT)
Dept: PULMONOLOGY | Facility: CLINIC | Age: 69
End: 2024-11-06
Payer: MEDICARE

## 2024-11-06 VITALS
SYSTOLIC BLOOD PRESSURE: 182 MMHG | DIASTOLIC BLOOD PRESSURE: 90 MMHG | HEART RATE: 72 BPM | OXYGEN SATURATION: 97 % | HEIGHT: 61 IN | RESPIRATION RATE: 18 BRPM | WEIGHT: 183 LBS | BODY MASS INDEX: 34.55 KG/M2

## 2024-11-06 DIAGNOSIS — R06.02 SOB (SHORTNESS OF BREATH): ICD-10-CM

## 2024-11-06 DIAGNOSIS — I11.0 HYPERTENSIVE HEART DISEASE WITH HEART FAILURE: ICD-10-CM

## 2024-11-06 DIAGNOSIS — J44.9 COPD, MILD: ICD-10-CM

## 2024-11-06 DIAGNOSIS — J43.9 PULMONARY EMPHYSEMA, UNSPECIFIED EMPHYSEMA TYPE: Primary | ICD-10-CM

## 2024-11-06 DIAGNOSIS — I10 ESSENTIAL HYPERTENSION: ICD-10-CM

## 2024-11-06 DIAGNOSIS — E66.01 MORBID OBESITY: ICD-10-CM

## 2024-11-06 PROCEDURE — 1100F PTFALLS ASSESS-DOCD GE2>/YR: CPT | Mod: CPTII,S$GLB,, | Performed by: INTERNAL MEDICINE

## 2024-11-06 PROCEDURE — 1160F RVW MEDS BY RX/DR IN RCRD: CPT | Mod: CPTII,S$GLB,, | Performed by: INTERNAL MEDICINE

## 2024-11-06 PROCEDURE — 99999 PR PBB SHADOW E&M-EST. PATIENT-LVL IV: CPT | Mod: PBBFAC,,, | Performed by: INTERNAL MEDICINE

## 2024-11-06 PROCEDURE — 3288F FALL RISK ASSESSMENT DOCD: CPT | Mod: CPTII,S$GLB,, | Performed by: INTERNAL MEDICINE

## 2024-11-06 PROCEDURE — 99214 OFFICE O/P EST MOD 30 MIN: CPT | Mod: S$GLB,,, | Performed by: INTERNAL MEDICINE

## 2024-11-06 PROCEDURE — 4010F ACE/ARB THERAPY RXD/TAKEN: CPT | Mod: CPTII,S$GLB,, | Performed by: INTERNAL MEDICINE

## 2024-11-06 PROCEDURE — 3008F BODY MASS INDEX DOCD: CPT | Mod: CPTII,S$GLB,, | Performed by: INTERNAL MEDICINE

## 2024-11-06 PROCEDURE — 1159F MED LIST DOCD IN RCRD: CPT | Mod: CPTII,S$GLB,, | Performed by: INTERNAL MEDICINE

## 2024-11-06 PROCEDURE — 1125F AMNT PAIN NOTED PAIN PRSNT: CPT | Mod: CPTII,S$GLB,, | Performed by: INTERNAL MEDICINE

## 2024-11-06 PROCEDURE — 3080F DIAST BP >= 90 MM HG: CPT | Mod: CPTII,S$GLB,, | Performed by: INTERNAL MEDICINE

## 2024-11-06 PROCEDURE — 3077F SYST BP >= 140 MM HG: CPT | Mod: CPTII,S$GLB,, | Performed by: INTERNAL MEDICINE

## 2024-11-06 RX ORDER — LORATADINE 10 MG/1
10 TABLET ORAL
COMMUNITY

## 2024-11-06 RX ORDER — ALBUTEROL SULFATE 90 UG/1
2 INHALANT RESPIRATORY (INHALATION) EVERY 4 HOURS PRN
Qty: 54 G | Refills: 3 | Status: SHIPPED | OUTPATIENT
Start: 2024-11-06

## 2024-11-06 NOTE — PROGRESS NOTES
Pt unable to perform pulmonary stress test today due to high BP which was taken multiple times. Manual BP: 200/100. Dr Larios notified.

## 2024-11-06 NOTE — PROGRESS NOTES
Subjective:       Patient ID: Jacqueline Luna is a 69 y.o. female.    Chief Complaint:   HPI  69 y.o. with Chronic Obstructive Pulmonary Disease, obesity , Obstructive Sleep Apnea , hypertension , Diabetes Mellitus and insomnia  BP elevated today 200/100 - 6 min walk cancelled. Not using advair disk due to side effects - dry mouth    COPD  She presents for evaluation and treatment of COPD. The patient is not currently having symptoms / an exacerbation. Current symptoms include chronic dyspnea, dyspnea after 1/4 blocks, non-productive cough, cough productive of white sputum in small amounts, and wheezing. Symptoms have been present since several years ago and have been gradually worsening. She denies chills and fever. Associated symptoms include poor exercise tolerance.  This episode appears to have been triggered by cold air and exercise. Treatments tried for the current exacerbation: albuterol inhaler. The patient has been having similar episodes for approximately 9 years. She uses 1 pillows at night. Patient currently is not on home oxygen therapy.. The patient is having no constitutional symptoms, denying fever, chills, anorexia, or weight loss. The patient has not been hospitalized for this condition before. She has a history of 17 pack years. The patient is experiencing exercise intolerance (difficulty walking 1/4 blocks on flat ground).  Hx of COVID in 2020  Oxygen levels are stable    History of RONALDO- unable to tolerate Continuous Positive Airway Pressure        Past Medical History:   Diagnosis Date    Anxiety disorder, unspecified     COPD (chronic obstructive pulmonary disease)     Depression     Family history of malignant neoplasm of breast 08/08/2023    Fibrocystic breast     HTN (hypertension)     Hypercholesteremia     Mass of multiple sites of left breast 08/07/2023    Mass of multiple sites of right breast 08/07/2023    Mastodynia 08/08/2023    Mild intermittent asthma, uncomplicated     Pneumonia,  unspecified organism     Type 2 diabetes mellitus without complications      Past Surgical History:   Procedure Laterality Date    CLOSED REDUCTION, FINGER, WITH PINNING Right 08/11/2023    Procedure: CLOSED REDUCTION, FINGER, WITH PINNING;  Surgeon: Nawaf Mcclure MD;  Location: [facility] OR;  Service: Orthopedics;  Laterality: Right;  closed reduction and pinning right thumb proximal phalanx fracture    CORONARY ANGIOGRAPHY N/A 03/12/2021    Procedure: ANGIOGRAM, CORONARY ARTERY;  Surgeon: Shruti Harrison MD;  Location: [facility] CATH LAB;  Service: Cardiology;  Laterality: N/A;    CORONARY ANGIOPLASTY WITH STENT PLACEMENT N/A 03/12/2021    Procedure: Angioplasty, Coronary Artery, With Stent Insertion;  Surgeon: Shruti Harrison MD;  Location: [facility] CATH LAB;  Service: Cardiology;  Laterality: N/A;    LEFT HEART CATHETERIZATION Left 03/12/2021    Procedure: CATHETERIZATION, HEART, LEFT;  Surgeon: Shruti Harrison MD;  Location: [facility] CATH LAB;  Service: Cardiology;  Laterality: Left;    REFRACTIVE SURGERY Bilateral 10/2024    Dr Kvng Mercer     Social History     Socioeconomic History    Marital status: Single   Tobacco Use    Smoking status: Former     Current packs/day: 0.00     Average packs/day: 0.5 packs/day for 35.2 years (17.6 ttl pk-yrs)     Types: Cigarettes     Start date: 1/1/1986     Quit date: 3/5/2021     Years since quitting: 3.6   Substance and Sexual Activity    Alcohol use: Not Currently    Drug use: Never    Sexual activity: Not Currently     Birth control/protection: Abstinence     Social Drivers of Health     Financial Resource Strain: Low Risk  (6/17/2024)    Received from [facility]    Overall Financial Resource Strain (CARDIA)     Difficulty of Paying Living Expenses: Not hard at all   Food Insecurity: No Food Insecurity (6/17/2024)    Received from [facility]    Hunger Vital Sign     Worried About Running Out of Food in the Last Year: Never true     Ran Out of Food in the Last  Year: Never true   Transportation Needs: No Transportation Needs (6/17/2024)    Received from [facility]    PRAPARE - Transportation     Lack of Transportation (Medical): No     Lack of Transportation (Non-Medical): No   Physical Activity: Insufficiently Active (1/26/2024)    [facility]    Exercise Vital Sign     Days of Exercise per Week: 1 day     Minutes of Exercise per Session: 10 min   Stress: Stress Concern Present (1/26/2024)    [facility], [facility]    [place] [place] [employer] - Occupational Stress Questionnaire     Feeling of Stress : Very much   Housing Stability: Unknown (6/17/2024)    Received from [facility]    Housing Stability Vital Sign     Unable to Pay for Housing in the Last Year: No     Homeless in the Last Year: No     @FAM@  Review of Systems   Constitutional:  Positive for fatigue. Negative for fever.   HENT:  Positive for postnasal drip, rhinorrhea and congestion.    Eyes:  Negative for redness and itching.   Respiratory:  Positive for cough, sputum production, shortness of breath, dyspnea on extertion, use of rescue inhaler and Paroxysmal Nocturnal Dyspnea.    Cardiovascular:  Negative for chest pain, palpitations and leg swelling.   Genitourinary:  Negative for difficulty urinating and hematuria.   Endocrine:  Negative for cold intolerance and heat intolerance.    Skin:  Negative for rash.   Gastrointestinal:  Negative for nausea and abdominal pain.   Neurological:  Negative for dizziness, syncope, weakness and light-headedness.   Hematological:  Negative for adenopathy. Does not bruise/bleed easily.   Psychiatric/Behavioral:   "Negative for sleep disturbance. The patient is not nervous/anxious.        Objective:      BP (!) 182/90 (Patient Position: Sitting)   Pulse 72   Resp 18   Ht 5' 1" (1.549 m)   Wt 83 kg (183 lb)   SpO2 97%   BMI 34.58 kg/m²   Physical Exam  Vitals and nursing note reviewed.   Constitutional:       Appearance: She is well-developed. She is obese.   HENT:      Head: Normocephalic and atraumatic.      Nose: Nose normal.      Mouth/Throat:      Pharynx: No oropharyngeal exudate.   Eyes:      Conjunctiva/sclera: Conjunctivae normal.      Pupils: Pupils are equal, round, and reactive to light.   Neck:      Thyroid: No thyromegaly.      Vascular: No JVD.      Trachea: No tracheal deviation.   Cardiovascular:      Rate and Rhythm: Normal rate and regular rhythm.      Heart sounds: Normal heart sounds.   Pulmonary:      Effort: Pulmonary effort is normal. No respiratory distress.      Breath sounds: Examination of the right-lower field reveals wheezing. Examination of the left-lower field reveals wheezing. Decreased breath sounds and wheezing present. No rhonchi or rales.   Chest:      Chest wall: No tenderness.   Abdominal:      General: Bowel sounds are normal.      Palpations: Abdomen is soft.   Musculoskeletal:         General: Normal range of motion.      Cervical back: Neck supple.   Lymphadenopathy:      Cervical: No cervical adenopathy.   Skin:     General: Skin is warm and dry.   Neurological:      Mental Status: She is alert and oriented to person, place, and time.       Personal Diagnostic Review    Spirometry is normal. Spirometry is improved following bronchodilator administration. Lung volume determination is normal. Airway mechanics are abnormal showing increased airway resistance and decreased conductance. DLCO is normal. MVV is moderately   decreased.       Chest X-Ray: I personally reviewed the films and findings are:, air trapping/emphysema  CV Ultrasound Bilateral Doppler Carotid    There is 40-49% " right Internal Carotid Stenosis.    There is 0-19% left Internal Carotid Stenosis.      CT Chest without Contrast    Anatomical Region Laterality Modality   Chest -- Computed Tomography     Impression    1. Increase in a small pericardial effusion.   2. Mild emphysematous change.   3. No suspicious nodule that would require follow-up. Stable tiny right upper lobe micronodule.  Narrative    CT CHEST WO CONTRAST     CLINICAL INDICATION: tob abuse emphysema     COMPARISON: 2/4/2019     TECHNIQUE: CT scan of the chest was performed without IV contrast. Automated exposure control was utilized for dose reduction.     FINDINGS: There is mild centrilobular and paraseptal emphysema in the upper lung zones. No focal airspace consolidation. There is minimal chronic scarring in the right middle lobe and lingula.     There is a stable 3 mm micronodule within the right upper lobe on image 26. No suspicious nodule.     No pleural effusion. There is a small pericardial effusion, increased since 2/4/2019. No evidence of mediastinal adenopathy. No adrenal mass.  Procedure Note    Mick Mott MD - 09/24/2019   Formatting of this note might be different from the original.   CT CHEST WO CONTRAST     CLINICAL INDICATION: tob abuse emphysema     COMPARISON: 2/4/2019     TECHNIQUE: CT scan of the chest was performed without IV contrast. Automated exposure control was utilized for dose reduction.     FINDINGS: There is mild centrilobular and paraseptal emphysema in the upper lung zones. No focal airspace consolidation. There is minimal chronic scarring in the right middle lobe and lingula.     There is a stable 3 mm micronodule within the right upper lobe on image 26. No suspicious nodule.     No pleural effusion. There is a small pericardial effusion, increased since 2/4/2019. No evidence of mediastinal adenopathy. No adrenal mass.     IMPRESSION:   1. Increase in a small pericardial effusion.   2. Mild emphysematous change.  "  3. No suspicious nodule that would require follow-up. Stable tiny right upper lobe micronodule.  Exam End: 09/24/19 13:58 Last Resulted: 09/24/19 14:24   Received From: [facility]  Result Received: 01/17/23 10:23          Pulmonary function tests:  na      11/6/2024     2:08 PM 9/27/2024     9:15 AM 9/19/2024    10:19 AM 8/29/2024     7:32 PM 8/29/2024     5:58 PM 8/29/2024     5:52 PM 8/29/2024     4:31 PM   Pulmonary Studies Review   SpO2 97 %  98 % 95 % 96 % 98 % 96 %   Height 5' 1" (1.549 m) 5' 1" (1.549 m)        Weight 83 kg (183 lb) 82.1 kg (181 lb) 82.1 kg (181 lb)       BMI (Calculated) 34.6 34.2        Predicted Distance 259.83 268.15 481.56       Predicted Distance Meters (Calculated) 404.93 meters 412.79 meters              11/6/2024     2:08 PM 9/27/2024     9:15 AM 9/19/2024    10:19 AM 8/29/2024     7:32 PM 8/29/2024     5:58 PM 8/29/2024     5:52 PM 8/29/2024     4:31 PM   Pulmonary Function Tests   SpO2 97 %  98 % 95 % 96 % 98 % 96 %   Height 5' 1" (1.549 m) 5' 1" (1.549 m)        Weight 83 kg (183 lb) 82.1 kg (181 lb) 82.1 kg (181 lb)       BMI (Calculated) 34.6 34.2            CT Chest without Contrast    Anatomical Region Laterality Modality   Chest -- Computed Tomography     Impression    1. Increase in a small pericardial effusion.   2. Mild emphysematous change.   3. No suspicious nodule that would require follow-up. Stable tiny right upper lobe micronodule.  Narrative    CT CHEST WO CONTRAST     CLINICAL INDICATION: tob abuse emphysema     COMPARISON: 2/4/2019     TECHNIQUE: CT scan of the chest was performed without IV contrast. Automated exposure control was utilized for dose reduction.     FINDINGS: There is mild centrilobular and paraseptal emphysema in the upper lung zones. No focal airspace consolidation. There is minimal chronic scarring in the right middle lobe and lingula.     There is a stable 3 mm " micronodule within the right upper lobe on image 26. No suspicious nodule.     No pleural effusion. There is a small pericardial effusion, increased since 2/4/2019. No evidence of mediastinal adenopathy. No adrenal mass.  Procedure Note    Mick Mott MD - 09/24/2019   Formatting of this note might be different from the original.   CT CHEST WO CONTRAST     CLINICAL INDICATION: tob abuse emphysema     COMPARISON: 2/4/2019     TECHNIQUE: CT scan of the chest was performed without IV contrast. Automated exposure control was utilized for dose reduction.     FINDINGS: There is mild centrilobular and paraseptal emphysema in the upper lung zones. No focal airspace consolidation. There is minimal chronic scarring in the right middle lobe and lingula.     There is a stable 3 mm micronodule within the right upper lobe on image 26. No suspicious nodule.     No pleural effusion. There is a small pericardial effusion, increased since 2/4/2019. No evidence of mediastinal adenopathy. No adrenal mass.     IMPRESSION:   1. Increase in a small pericardial effusion.   2. Mild emphysematous change.   3. No suspicious nodule that would require follow-up. Stable tiny right upper lobe micronodule.  Exam End: 09/24/19 13:58 Last Resulted: 09/24/19 14:24   Received From: [facility] and [facility]  Result Received: 01/17/23 10:23    View Encounter             Assessment:       1. Pulmonary emphysema, unspecified emphysema type    2. Hypertensive heart disease with heart failure    3. Essential hypertension    4. Morbid obesity    5. SOB (shortness of breath)          Morbid obesity  Last documentation =       BMI noted to be elevated. Changes in weight over time reviewed in chart (if available) and by patient recollection. Patient advised and counseled concerning the adverse medical consequences of obesity. Treatment options discussed - calorie restriction, increasing  calorie expenditure, medical and surgical options noted.  The patient's severe obesity was monitored, evaluated, addressed and/or treated.   2013 AHA/ACC/TOS guideline for the management of overweight and obesity in adults: a report of the American College of Cardiology/American Heart Association Task Force on Practice Guidelines and The Obesity Society    Outpatient Encounter Medications as of 11/6/2024   Medication Sig Dispense Refill    amLODIPine (NORVASC) 10 MG tablet Take 10 mg by mouth once daily.      atorvastatin (LIPITOR) 80 MG tablet Take 1 tablet (80 mg total) by mouth every evening. 30 tablet 11    cholecalciferol, vitamin D3, 1,250 mcg (50,000 unit) capsule Take 50,000 Units by mouth every 7 days.      clopidogreL (PLAVIX) 75 mg tablet TAKE 1 TABLET EVERY DAY 90 tablet 3    ezetimibe (ZETIA) 10 mg tablet Take 10 mg by mouth once daily.      fluticasone-salmeterol diskus inhaler 250-50 mcg INHALE 1 PUFF INTO THE LUNGS 2 (TWO) TIMES DAILY. WASH OUT MOUTH AFTER USE. 180 each 3    furosemide (LASIX) 20 MG tablet Take 1 tablet (20 mg total) by mouth daily as needed (swelling). 30 tablet 11    gabapentin (NEURONTIN) 300 MG capsule Take 300 mg by mouth.      lisinopriL-hydrochlorothiazide (PRINZIDE,ZESTORETIC) 20-12.5 mg per tablet Take 1 tablet by mouth 2 (two) times a day.      metFORMIN (GLUCOPHAGE-XR) 500 MG ER 24hr tablet metformin  mg tablet,extended release 24 hr      metoprolol succinate (TOPROL-XL) 100 MG 24 hr tablet Take 1 tablet (100 mg total) by mouth once daily. 30 tablet 11    ondansetron (ZOFRAN) 4 MG tablet Take 1 tablet (4 mg total) by mouth every 6 (six) hours as needed for Nausea. 12 tablet 0    pantoprazole (PROTONIX) 40 MG tablet Take 40 mg by mouth once daily.      sertraline (ZOLOFT) 100 MG tablet Take 100 mg by mouth once daily.      TRUE METRIX GLUCOSE TEST STRIP Strp SMARTSIG:Via Meter      TRUEPLUS LANCETS 33 gauge Misc       XIFAXAN 550 mg Tab Take 550 mg by mouth 3 (three)  times daily.      [DISCONTINUED] albuterol (PROVENTIL/VENTOLIN HFA) 90 mcg/actuation inhaler INHALE 2 PUFFS INTO THE LUNGS EVERY 4 (FOUR) HOURS AS NEEDED FOR WHEEZING OR SHORTNESS OF BREATH. RESCUE 54 g 3    albuterol (PROVENTIL/VENTOLIN HFA) 90 mcg/actuation inhaler Inhale 2 puffs into the lungs every 4 (four) hours as needed for Wheezing or Shortness of Breath. Rescue 54 g 3    budesonide-glycopyr-formoterol 160-9-4.8 mcg/actuation HFAA Inhale 2 puffs into the lungs 2 (two) times daily. Wash out mouth after use. 10.7 g 11    loratadine (CLARITIN) 10 mg tablet Take 10 mg by mouth.      [DISCONTINUED] amoxicillin-clavulanate 875-125mg (AUGMENTIN) 875-125 mg per tablet Take 1 tablet by mouth 2 (two) times daily. 14 tablet 0     Facility-Administered Encounter Medications as of 11/6/2024   Medication Dose Route Frequency Provider Last Rate Last Admin    chlorhexidine 0.12 % solution 10 mL  10 mL Mouth/Throat On Call Procedure Cassidy Malik PA-C   10 mL at 08/11/23 1130     Orders Placed This Encounter   Procedures    Spirometry with/without bronchodilator     Standing Status:   Future     Standing Expiration Date:   11/6/2025     Order Specific Question:   Release to patient     Answer:   Immediate    Six Minute Walk Test to qualify for Home Oxygen     Standing Status:   Future     Standing Expiration Date:   11/6/2025     Order Specific Question:   Release to patient     Answer:   Immediate     Plan:       Requested Prescriptions     Signed Prescriptions Disp Refills    albuterol (PROVENTIL/VENTOLIN HFA) 90 mcg/actuation inhaler 54 g 3     Sig: Inhale 2 puffs into the lungs every 4 (four) hours as needed for Wheezing or Shortness of Breath. Rescue    budesonide-glycopyr-formoterol 160-9-4.8 mcg/actuation HFAA 10.7 g 11     Sig: Inhale 2 puffs into the lungs 2 (two) times daily. Wash out mouth after use.     Pulmonary emphysema, unspecified emphysema type  -     albuterol (PROVENTIL/VENTOLIN HFA) 90 mcg/actuation  inhaler; Inhale 2 puffs into the lungs every 4 (four) hours as needed for Wheezing or Shortness of Breath. Rescue  Dispense: 54 g; Refill: 3  -     budesonide-glycopyr-formoterol 160-9-4.8 mcg/actuation HFAA; Inhale 2 puffs into the lungs 2 (two) times daily. Wash out mouth after use.  Dispense: 10.7 g; Refill: 11  -     Spirometry with/without bronchodilator; Future; Expected date: 05/09/2025  -     Six Minute Walk Test to qualify for Home Oxygen; Future    Hypertensive heart disease with heart failure    Essential hypertension    Morbid obesity    SOB (shortness of breath)  -     Spirometry with/without bronchodilator; Future; Expected date: 05/09/2025  -     Six Minute Walk Test to qualify for Home Oxygen; Future        Follow up in about 6 months (around 5/6/2025) for jenaro - on return, 6 min walk - on return, Follow up with NP.    MEDICAL DECISION MAKING: Moderate to high complexity.  Overall, the multiple problems listed are of moderate to high severity that may impact quality of life and activities of daily living. Side effects of medications, treatment plan as well as options and alternatives reviewed and discussed with patient. There was counseling of patient concerning these issues.    Total time spent in counseling and coordination of care - 33  minutes of total time spent on the encounter, which includes face to face time and non-face to face time preparing to see the patient (eg, review of tests), Obtaining and/or reviewing separately obtained history, Documenting clinical information in the electronic or other health record, Independently interpreting results (not separately reported) and communicating results to the patient/family/caregiver, or Care coordination (not separately reported).    Time was used in discussion of prognosis, risks, benefits of treatment, instructions and compliance with regimen . Discussion with other physicians and/or health care providers - home health or for use of durable  medical equipment (oxygen, nebulizers, CPAP, BiPAP) occurred.

## 2024-11-11 DIAGNOSIS — M25.561 RIGHT KNEE PAIN: Primary | ICD-10-CM

## 2024-11-11 DIAGNOSIS — S83.241A ACUTE MEDIAL MENISCUS TEAR OF RIGHT KNEE: ICD-10-CM

## 2024-11-15 DIAGNOSIS — M25.569 KNEE PAIN, UNSPECIFIED CHRONICITY, UNSPECIFIED LATERALITY: Primary | ICD-10-CM

## 2024-11-20 ENCOUNTER — OFFICE VISIT (OUTPATIENT)
Dept: ORTHOPEDICS | Facility: CLINIC | Age: 69
End: 2024-11-20
Payer: MEDICARE

## 2024-11-20 ENCOUNTER — HOSPITAL ENCOUNTER (OUTPATIENT)
Dept: RADIOLOGY | Facility: HOSPITAL | Age: 69
Discharge: HOME | End: 2024-11-20
Attending: PHYSICIAN ASSISTANT
Payer: MEDICARE

## 2024-11-20 VITALS
HEIGHT: 61 IN | HEART RATE: 59 BPM | BODY MASS INDEX: 34.55 KG/M2 | SYSTOLIC BLOOD PRESSURE: 127 MMHG | WEIGHT: 183 LBS | DIASTOLIC BLOOD PRESSURE: 69 MMHG

## 2024-11-20 DIAGNOSIS — M17.11 ARTHRITIS OF RIGHT KNEE: Primary | ICD-10-CM

## 2024-11-20 DIAGNOSIS — M71.21 BAKER'S CYST OF KNEE, RIGHT: ICD-10-CM

## 2024-11-20 DIAGNOSIS — M25.569 KNEE PAIN, UNSPECIFIED CHRONICITY, UNSPECIFIED LATERALITY: ICD-10-CM

## 2024-11-20 PROCEDURE — 73564 X-RAY EXAM KNEE 4 OR MORE: CPT | Mod: 26,50,, | Performed by: RADIOLOGY

## 2024-11-20 PROCEDURE — 99999 PR PBB SHADOW E&M-EST. PATIENT-LVL IV: CPT | Mod: PBBFAC,,, | Performed by: PHYSICIAN ASSISTANT

## 2024-11-20 PROCEDURE — 73564 X-RAY EXAM KNEE 4 OR MORE: CPT | Mod: TC,50

## 2024-11-20 RX ORDER — MELOXICAM 15 MG/1
15 TABLET ORAL DAILY
Qty: 30 TABLET | Refills: 2 | Status: SHIPPED | OUTPATIENT
Start: 2024-11-20 | End: 2025-02-18
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
## 2024-12-04 ENCOUNTER — HOSPITAL ENCOUNTER (INPATIENT)
Facility: HOSPITAL | Age: 69
LOS: 2 days | Discharge: HOME | DRG: 683 | End: 2024-12-06
Attending: EMERGENCY MEDICINE | Admitting: FAMILY MEDICINE
Payer: MEDICARE

## 2024-12-04 DIAGNOSIS — R07.9 CHEST PAIN: ICD-10-CM

## 2024-12-04 DIAGNOSIS — Z22.1 CLOSTRIDIUM DIFFICILE CARRIER: ICD-10-CM

## 2024-12-04 DIAGNOSIS — N17.9 ACUTE RENAL FAILURE, UNSPECIFIED ACUTE RENAL FAILURE TYPE: Primary | ICD-10-CM

## 2024-12-04 DIAGNOSIS — R10.10 UPPER ABDOMINAL PAIN: ICD-10-CM

## 2024-12-04 DIAGNOSIS — A04.72 CLOSTRIDIUM DIFFICILE DIARRHEA: ICD-10-CM

## 2024-12-04 DIAGNOSIS — R10.84 GENERALIZED ABDOMINAL PAIN: ICD-10-CM

## 2024-12-04 PROBLEM — R19.7 DIARRHEA: Status: ACTIVE | Noted: 2024-12-04

## 2024-12-04 PROBLEM — I25.10 CAD (CORONARY ARTERY DISEASE): Status: ACTIVE | Noted: 2024-09-19

## 2024-12-04 PROBLEM — E11.9 DIABETES: Status: ACTIVE | Noted: 2024-12-04

## 2024-12-04 PROBLEM — J44.9 COPD (CHRONIC OBSTRUCTIVE PULMONARY DISEASE): Status: ACTIVE | Noted: 2024-12-04

## 2024-12-04 LAB
ALBUMIN SERPL BCP-MCNC: 3.6 G/DL (ref 3.5–5.2)
ALP SERPL-CCNC: 109 U/L (ref 40–150)
ALT SERPL W/O P-5'-P-CCNC: 13 U/L (ref 10–44)
ANION GAP SERPL CALC-SCNC: 14 MMOL/L (ref 8–16)
AST SERPL-CCNC: 17 U/L (ref 10–40)
BACTERIA #/AREA URNS HPF: ABNORMAL /HPF
BASOPHILS # BLD AUTO: 0.04 K/UL (ref 0–0.2)
BASOPHILS NFR BLD: 0.4 % (ref 0–1.9)
BILIRUB SERPL-MCNC: 0.2 MG/DL (ref 0.1–1)
BILIRUB UR QL STRIP: NEGATIVE
BUN SERPL-MCNC: 47 MG/DL (ref 8–23)
C DIFF GDH STL QL: POSITIVE
C DIFF TOX A+B STL QL IA: NEGATIVE
CALCIUM SERPL-MCNC: 9.7 MG/DL (ref 8.7–10.5)
CHLORIDE SERPL-SCNC: 102 MMOL/L (ref 95–110)
CLARITY UR: ABNORMAL
CO2 SERPL-SCNC: 21 MMOL/L (ref 23–29)
COLOR UR: YELLOW
CREAT SERPL-MCNC: 4.4 MG/DL (ref 0.5–1.4)
DIFFERENTIAL METHOD BLD: ABNORMAL
EOSINOPHIL # BLD AUTO: 0.2 K/UL (ref 0–0.5)
EOSINOPHIL NFR BLD: 1.7 % (ref 0–8)
ERYTHROCYTE [DISTWIDTH] IN BLOOD BY AUTOMATED COUNT: 15.4 % (ref 11.5–14.5)
EST. GFR  (NO RACE VARIABLE): 10 ML/MIN/1.73 M^2
GLUCOSE SERPL-MCNC: 104 MG/DL (ref 70–110)
GLUCOSE UR QL STRIP: NEGATIVE
HCT VFR BLD AUTO: 40.8 % (ref 37–48.5)
HGB BLD-MCNC: 13.1 G/DL (ref 12–16)
HGB UR QL STRIP: ABNORMAL
HYALINE CASTS #/AREA URNS LPF: 59 /LPF
IMM GRANULOCYTES # BLD AUTO: 0.04 K/UL (ref 0–0.04)
IMM GRANULOCYTES NFR BLD AUTO: 0.4 % (ref 0–0.5)
KETONES UR QL STRIP: NEGATIVE
LACTATE SERPL-SCNC: 0.9 MMOL/L (ref 0.5–2.2)
LEUKOCYTE ESTERASE UR QL STRIP: ABNORMAL
LIPASE SERPL-CCNC: 45 U/L (ref 4–60)
LYMPHOCYTES # BLD AUTO: 3.1 K/UL (ref 1–4.8)
LYMPHOCYTES NFR BLD: 28.2 % (ref 18–48)
MAGNESIUM SERPL-MCNC: 2.4 MG/DL (ref 1.6–2.6)
MCH RBC QN AUTO: 24.8 PG (ref 27–31)
MCHC RBC AUTO-ENTMCNC: 32.1 G/DL (ref 32–36)
MCV RBC AUTO: 77 FL (ref 82–98)
MICROSCOPIC COMMENT: ABNORMAL
MONOCYTES # BLD AUTO: 1.3 K/UL (ref 0.3–1)
MONOCYTES NFR BLD: 11.8 % (ref 4–15)
NEUTROPHILS # BLD AUTO: 6.4 K/UL (ref 1.8–7.7)
NEUTROPHILS NFR BLD: 57.5 % (ref 38–73)
NITRITE UR QL STRIP: NEGATIVE
NRBC BLD-RTO: 0 /100 WBC
OB PNL STL: NEGATIVE
OHS QRS DURATION: 78 MS
OHS QTC CALCULATION: 430 MS
PH UR STRIP: 5 [PH] (ref 5–8)
PLATELET # BLD AUTO: 292 K/UL (ref 150–450)
PMV BLD AUTO: 9.4 FL (ref 9.2–12.9)
POCT GLUCOSE: 136 MG/DL (ref 70–110)
POTASSIUM SERPL-SCNC: 3.6 MMOL/L (ref 3.5–5.1)
PROT SERPL-MCNC: 7.9 G/DL (ref 6–8.4)
PROT UR QL STRIP: ABNORMAL
RBC # BLD AUTO: 5.28 M/UL (ref 4–5.4)
RBC #/AREA URNS HPF: 5 /HPF (ref 0–4)
RV AG STL QL IA.RAPID: NEGATIVE
SODIUM SERPL-SCNC: 137 MMOL/L (ref 136–145)
SP GR UR STRIP: 1.01 (ref 1–1.03)
SQUAMOUS #/AREA URNS HPF: 61 /HPF
URN SPEC COLLECT METH UR: ABNORMAL
UROBILINOGEN UR STRIP-ACNC: NEGATIVE EU/DL
WBC # BLD AUTO: 11.07 K/UL (ref 3.9–12.7)
WBC #/AREA URNS HPF: 8 /HPF (ref 0–5)

## 2024-12-04 PROCEDURE — 83605 ASSAY OF LACTIC ACID: CPT | Performed by: EMERGENCY MEDICINE

## 2024-12-04 PROCEDURE — 87209 SMEAR COMPLEX STAIN: CPT | Performed by: EMERGENCY MEDICINE

## 2024-12-04 PROCEDURE — 11000001 HC ACUTE MED/SURG PRIVATE ROOM

## 2024-12-04 PROCEDURE — 21400001 HC TELEMETRY ROOM

## 2024-12-04 PROCEDURE — 25000003 PHARM REV CODE 250: Performed by: EMERGENCY MEDICINE

## 2024-12-04 PROCEDURE — 96361 HYDRATE IV INFUSION ADD-ON: CPT

## 2024-12-04 PROCEDURE — 25000003 PHARM REV CODE 250: Performed by: NURSE PRACTITIONER

## 2024-12-04 PROCEDURE — 83735 ASSAY OF MAGNESIUM: CPT | Performed by: EMERGENCY MEDICINE

## 2024-12-04 PROCEDURE — 87449 NOS EACH ORGANISM AG IA: CPT | Performed by: EMERGENCY MEDICINE

## 2024-12-04 PROCEDURE — 83690 ASSAY OF LIPASE: CPT | Performed by: EMERGENCY MEDICINE

## 2024-12-04 PROCEDURE — 93005 ELECTROCARDIOGRAM TRACING: CPT

## 2024-12-04 PROCEDURE — 99285 EMERGENCY DEPT VISIT HI MDM: CPT | Mod: 25

## 2024-12-04 PROCEDURE — 87425 ROTAVIRUS AG IA: CPT | Performed by: EMERGENCY MEDICINE

## 2024-12-04 PROCEDURE — 87046 STOOL CULTR AEROBIC BACT EA: CPT | Performed by: EMERGENCY MEDICINE

## 2024-12-04 PROCEDURE — 63600175 PHARM REV CODE 636 W HCPCS: Performed by: NURSE PRACTITIONER

## 2024-12-04 PROCEDURE — 87328 CRYPTOSPORIDIUM AG IA: CPT | Performed by: EMERGENCY MEDICINE

## 2024-12-04 PROCEDURE — 25000242 PHARM REV CODE 250 ALT 637 W/ HCPCS: Performed by: NURSE PRACTITIONER

## 2024-12-04 PROCEDURE — 83993 ASSAY FOR CALPROTECTIN FECAL: CPT | Performed by: EMERGENCY MEDICINE

## 2024-12-04 PROCEDURE — 89055 LEUKOCYTE ASSESSMENT FECAL: CPT | Performed by: EMERGENCY MEDICINE

## 2024-12-04 PROCEDURE — 80053 COMPREHEN METABOLIC PANEL: CPT | Performed by: EMERGENCY MEDICINE

## 2024-12-04 PROCEDURE — 63600175 PHARM REV CODE 636 W HCPCS: Performed by: EMERGENCY MEDICINE

## 2024-12-04 PROCEDURE — 96360 HYDRATION IV INFUSION INIT: CPT

## 2024-12-04 PROCEDURE — 87045 FECES CULTURE AEROBIC BACT: CPT | Performed by: EMERGENCY MEDICINE

## 2024-12-04 PROCEDURE — 27000207 HC ISOLATION

## 2024-12-04 PROCEDURE — 81000 URINALYSIS NONAUTO W/SCOPE: CPT | Performed by: EMERGENCY MEDICINE

## 2024-12-04 PROCEDURE — 87427 SHIGA-LIKE TOXIN AG IA: CPT | Performed by: EMERGENCY MEDICINE

## 2024-12-04 PROCEDURE — 87493 C DIFF AMPLIFIED PROBE: CPT | Performed by: EMERGENCY MEDICINE

## 2024-12-04 PROCEDURE — 87449 NOS EACH ORGANISM AG IA: CPT | Mod: 91 | Performed by: EMERGENCY MEDICINE

## 2024-12-04 PROCEDURE — 85025 COMPLETE CBC W/AUTO DIFF WBC: CPT | Performed by: EMERGENCY MEDICINE

## 2024-12-04 PROCEDURE — 82272 OCCULT BLD FECES 1-3 TESTS: CPT | Performed by: EMERGENCY MEDICINE

## 2024-12-04 PROCEDURE — 94640 AIRWAY INHALATION TREATMENT: CPT

## 2024-12-04 PROCEDURE — 93010 ELECTROCARDIOGRAM REPORT: CPT | Mod: ,,, | Performed by: INTERNAL MEDICINE

## 2024-12-04 RX ORDER — TALC
6 POWDER (GRAM) TOPICAL NIGHTLY PRN
Status: DISCONTINUED | OUTPATIENT
Start: 2024-12-04 | End: 2024-12-06 | Stop reason: HOSPADM

## 2024-12-04 RX ORDER — SODIUM CHLORIDE 0.9 % (FLUSH) 0.9 %
10 SYRINGE (ML) INJECTION EVERY 8 HOURS PRN
Status: DISCONTINUED | OUTPATIENT
Start: 2024-12-04 | End: 2024-12-06 | Stop reason: HOSPADM

## 2024-12-04 RX ORDER — IBUPROFEN 200 MG
16 TABLET ORAL
Status: DISCONTINUED | OUTPATIENT
Start: 2024-12-04 | End: 2024-12-06 | Stop reason: HOSPADM

## 2024-12-04 RX ORDER — ARFORMOTEROL TARTRATE 15 UG/2ML
15 SOLUTION RESPIRATORY (INHALATION) 2 TIMES DAILY
Status: DISCONTINUED | OUTPATIENT
Start: 2024-12-04 | End: 2024-12-06 | Stop reason: HOSPADM

## 2024-12-04 RX ORDER — NALOXONE HCL 0.4 MG/ML
0.02 VIAL (ML) INJECTION
Status: DISCONTINUED | OUTPATIENT
Start: 2024-12-04 | End: 2024-12-06 | Stop reason: HOSPADM

## 2024-12-04 RX ORDER — PANTOPRAZOLE SODIUM 40 MG/1
40 TABLET, DELAYED RELEASE ORAL DAILY
Status: DISCONTINUED | OUTPATIENT
Start: 2024-12-05 | End: 2024-12-06 | Stop reason: HOSPADM

## 2024-12-04 RX ORDER — IBUPROFEN 200 MG
24 TABLET ORAL
Status: DISCONTINUED | OUTPATIENT
Start: 2024-12-04 | End: 2024-12-06 | Stop reason: HOSPADM

## 2024-12-04 RX ORDER — IPRATROPIUM BROMIDE AND ALBUTEROL SULFATE 2.5; .5 MG/3ML; MG/3ML
3 SOLUTION RESPIRATORY (INHALATION) EVERY 6 HOURS PRN
Status: DISCONTINUED | OUTPATIENT
Start: 2024-12-04 | End: 2024-12-06 | Stop reason: HOSPADM

## 2024-12-04 RX ORDER — SODIUM CHLORIDE 9 MG/ML
INJECTION, SOLUTION INTRAVENOUS CONTINUOUS
Status: DISCONTINUED | OUTPATIENT
Start: 2024-12-04 | End: 2024-12-06

## 2024-12-04 RX ORDER — SODIUM CHLORIDE, SODIUM LACTATE, POTASSIUM CHLORIDE, CALCIUM CHLORIDE 600; 310; 30; 20 MG/100ML; MG/100ML; MG/100ML; MG/100ML
1000 INJECTION, SOLUTION INTRAVENOUS
Status: COMPLETED | OUTPATIENT
Start: 2024-12-04 | End: 2024-12-04

## 2024-12-04 RX ORDER — CLOPIDOGREL BISULFATE 75 MG/1
75 TABLET ORAL DAILY
Status: DISCONTINUED | OUTPATIENT
Start: 2024-12-05 | End: 2024-12-06 | Stop reason: HOSPADM

## 2024-12-04 RX ORDER — ONDANSETRON HYDROCHLORIDE 2 MG/ML
4 INJECTION, SOLUTION INTRAVENOUS EVERY 6 HOURS PRN
Status: DISCONTINUED | OUTPATIENT
Start: 2024-12-04 | End: 2024-12-06 | Stop reason: HOSPADM

## 2024-12-04 RX ORDER — HYDROCODONE BITARTRATE AND ACETAMINOPHEN 5; 325 MG/1; MG/1
1 TABLET ORAL EVERY 6 HOURS PRN
Status: DISCONTINUED | OUTPATIENT
Start: 2024-12-04 | End: 2024-12-06 | Stop reason: HOSPADM

## 2024-12-04 RX ORDER — HEPARIN SODIUM 5000 [USP'U]/ML
5000 INJECTION, SOLUTION INTRAVENOUS; SUBCUTANEOUS EVERY 8 HOURS
Status: DISCONTINUED | OUTPATIENT
Start: 2024-12-04 | End: 2024-12-06 | Stop reason: HOSPADM

## 2024-12-04 RX ORDER — INSULIN ASPART 100 [IU]/ML
0-5 INJECTION, SOLUTION INTRAVENOUS; SUBCUTANEOUS
Status: DISCONTINUED | OUTPATIENT
Start: 2024-12-04 | End: 2024-12-06 | Stop reason: HOSPADM

## 2024-12-04 RX ORDER — SERTRALINE HYDROCHLORIDE 50 MG/1
100 TABLET, FILM COATED ORAL DAILY
Status: DISCONTINUED | OUTPATIENT
Start: 2024-12-05 | End: 2024-12-06 | Stop reason: HOSPADM

## 2024-12-04 RX ORDER — ACETAMINOPHEN 325 MG/1
650 TABLET ORAL EVERY 4 HOURS PRN
Status: DISCONTINUED | OUTPATIENT
Start: 2024-12-04 | End: 2024-12-06 | Stop reason: HOSPADM

## 2024-12-04 RX ORDER — SIMETHICONE 80 MG
1 TABLET,CHEWABLE ORAL 4 TIMES DAILY PRN
Status: DISCONTINUED | OUTPATIENT
Start: 2024-12-04 | End: 2024-12-06 | Stop reason: HOSPADM

## 2024-12-04 RX ORDER — METOPROLOL SUCCINATE 50 MG/1
100 TABLET, EXTENDED RELEASE ORAL DAILY
Status: DISCONTINUED | OUTPATIENT
Start: 2024-12-05 | End: 2024-12-06 | Stop reason: HOSPADM

## 2024-12-04 RX ORDER — BUDESONIDE 0.5 MG/2ML
0.5 INHALANT ORAL EVERY 12 HOURS
Status: DISCONTINUED | OUTPATIENT
Start: 2024-12-04 | End: 2024-12-04 | Stop reason: SDUPTHER

## 2024-12-04 RX ORDER — BUDESONIDE 0.5 MG/2ML
0.5 INHALANT ORAL EVERY 12 HOURS
Status: DISCONTINUED | OUTPATIENT
Start: 2024-12-04 | End: 2024-12-06 | Stop reason: HOSPADM

## 2024-12-04 RX ORDER — GABAPENTIN 300 MG/1
300 CAPSULE ORAL NIGHTLY
Status: DISCONTINUED | OUTPATIENT
Start: 2024-12-04 | End: 2024-12-06 | Stop reason: HOSPADM

## 2024-12-04 RX ORDER — GLUCAGON 1 MG
1 KIT INJECTION
Status: DISCONTINUED | OUTPATIENT
Start: 2024-12-04 | End: 2024-12-06 | Stop reason: HOSPADM

## 2024-12-04 RX ORDER — PROCHLORPERAZINE EDISYLATE 5 MG/ML
5 INJECTION INTRAMUSCULAR; INTRAVENOUS EVERY 6 HOURS PRN
Status: DISCONTINUED | OUTPATIENT
Start: 2024-12-04 | End: 2024-12-06 | Stop reason: HOSPADM

## 2024-12-04 RX ADMIN — GABAPENTIN 300 MG: 300 CAPSULE ORAL at 09:12

## 2024-12-04 RX ADMIN — BUDESONIDE INHALATION 0.5 MG: 0.5 SUSPENSION RESPIRATORY (INHALATION) at 08:12

## 2024-12-04 RX ADMIN — HEPARIN SODIUM 5000 UNITS: 5000 INJECTION INTRAVENOUS; SUBCUTANEOUS at 09:12

## 2024-12-04 RX ADMIN — SODIUM CHLORIDE, POTASSIUM CHLORIDE, SODIUM LACTATE AND CALCIUM CHLORIDE 1000 ML: 600; 310; 30; 20 INJECTION, SOLUTION INTRAVENOUS at 03:12

## 2024-12-04 RX ADMIN — ARFORMOTEROL TARTRATE 15 MCG: 15 SOLUTION RESPIRATORY (INHALATION) at 08:12

## 2024-12-04 RX ADMIN — SODIUM CHLORIDE 1000 ML: 9 INJECTION, SOLUTION INTRAVENOUS at 01:12

## 2024-12-04 NOTE — H&P
[facility] - Emergency Dept.  [facility] Medicine  History & Physical    Patient Name: Jacqueline Luna  MRN: [id number]  Patient Class: IP- Inpatient  Admission Date: 12/4/2024  Attending Physician: Yvette Mireles MD   Primary Care Provider: Claudio Christianson Jr., MD         Patient information was obtained from patient and ER records.     Subjective:     Principal Problem:SHAY (acute kidney injury)    Chief Complaint:   Chief Complaint   Patient presents with    General Illness     Severe diarrhea since Sunday, fatigue, n/v, decrease in appetite, and generalized body aches. Pt states she feels extremely dehydrated, denies CP.         HPI: The patient is a 68 yo female with CAD s/p stent, COPD, RONALDO, HTN, DM who presented to ED with non bloody diarrhea x 4 days with associated decreased appetite, nausea, generalized weakness, and fatigue. Pt reports several stools per day. She denies abdominal pain, fever, chills. Symptoms unrelieved with imodium     In the ED, Afebrile, low normal BP labs revealed SHAY with serum Cr 4.4. Cdiff +antigen, negative toxin. PCR pending. ?UTI with 3+ leukocytes but only 8 WBC. Abd Xray showed nonobstructive bowel gas pattern. Renal U/S showed medical renal disease.     Past Medical History:   Diagnosis Date    Anxiety disorder, unspecified     COPD (chronic obstructive pulmonary disease)     Depression     Family history of malignant neoplasm of breast 08/08/2023    Fibrocystic breast     HTN (hypertension)     Hypercholesteremia     Mass of multiple sites of left breast 08/07/2023    Mass of multiple sites of right breast 08/07/2023    Mastodynia 08/08/2023    Mild intermittent asthma, uncomplicated     Pneumonia, unspecified organism     Type 2 diabetes mellitus without complications        Past Surgical History:   Procedure Laterality Date    CLOSED REDUCTION, FINGER, WITH PINNING Right 08/11/2023    Procedure: CLOSED REDUCTION, FINGER, WITH PINNING;  Surgeon: Nawaf Mcclure MD;   Location: [facility] OR;  Service: Orthopedics;  Laterality: Right;  closed reduction and pinning right thumb proximal phalanx fracture    CORONARY ANGIOGRAPHY N/A 03/12/2021    Procedure: ANGIOGRAM, CORONARY ARTERY;  Surgeon: Shruti Harrison MD;  Location: [facility] CATH LAB;  Service: Cardiology;  Laterality: N/A;    CORONARY ANGIOPLASTY WITH STENT PLACEMENT N/A 03/12/2021    Procedure: Angioplasty, Coronary Artery, With Stent Insertion;  Surgeon: Shruti Harrison MD;  Location: [facility] CATH LAB;  Service: Cardiology;  Laterality: N/A;    LEFT HEART CATHETERIZATION Left 03/12/2021    Procedure: CATHETERIZATION, HEART, LEFT;  Surgeon: Shruti Harrison MD;  Location: [facility] CATH LAB;  Service: Cardiology;  Laterality: Left;    REFRACTIVE SURGERY Bilateral 10/2024    Dr Kvng Mercer       Review of patient's allergies indicates:  No Known Allergies    Current Facility-Administered Medications on File Prior to Encounter   Medication    chlorhexidine 0.12 % solution 10 mL     Current Outpatient Medications on File Prior to Encounter   Medication Sig    amLODIPine (NORVASC) 10 MG tablet Take 10 mg by mouth once daily.    atorvastatin (LIPITOR) 80 MG tablet Take 1 tablet (80 mg total) by mouth every evening.    budesonide-glycopyr-formoterol 160-9-4.8 mcg/actuation HFAA Inhale 2 puffs into the lungs 2 (two) times daily. Wash out mouth after use.    cholecalciferol, vitamin D3, 1,250 mcg (50,000 unit) capsule Take 50,000 Units by mouth every 7 days.    clopidogreL (PLAVIX) 75 mg tablet TAKE 1 TABLET EVERY DAY    ezetimibe (ZETIA) 10 mg tablet Take 10 mg by mouth once daily.    furosemide (LASIX) 20 MG tablet Take 1 tablet (20 mg total) by mouth daily as needed (swelling).    gabapentin (NEURONTIN) 300 MG capsule Take 300 mg by mouth 3 (three) times daily.    lisinopriL-hydrochlorothiazide (PRINZIDE,ZESTORETIC) 20-12.5 mg per tablet Take 1 tablet by mouth 2 (two) times a day.    meloxicam (MOBIC) 15 MG tablet Take 1 tablet (15 mg  total) by mouth once daily.    metFORMIN (GLUCOPHAGE-XR) 500 MG ER 24hr tablet Take 500 mg by mouth once daily.    metoprolol succinate (TOPROL-XL) 100 MG 24 hr tablet Take 1 tablet (100 mg total) by mouth once daily.    pantoprazole (PROTONIX) 40 MG tablet Take 40 mg by mouth once daily.    rifAXIMin (XIFAXAN) 550 mg Tab Take 550 mg by mouth 3 (three) times daily.    sertraline (ZOLOFT) 100 MG tablet Take 100 mg by mouth once daily.    albuterol (PROVENTIL/VENTOLIN HFA) 90 mcg/actuation inhaler Inhale 2 puffs into the lungs every 4 (four) hours as needed for Wheezing or Shortness of Breath. Rescue    loratadine (CLARITIN) 10 mg tablet Take 10 mg by mouth daily as needed.    ondansetron (ZOFRAN) 4 MG tablet Take 1 tablet (4 mg total) by mouth every 6 (six) hours as needed for Nausea.    TRUE METRIX GLUCOSE TEST STRIP Strp SMARTSIG:Via Meter    TRUEPLUS LANCETS 33 gauge Misc 1 lancet  by Each Nostril route 2 (two) times a day.     Family History       Problem Relation (Age of Onset)    Breast cancer Sister, Sister (74), Maternal Aunt, Maternal Aunt    COPD Father    Cancer Mother, Father    Diabetes Mother    Hypertension Mother          Tobacco Use    Smoking status: Former     Current packs/day: 0.00     Average packs/day: 0.5 packs/day for 35.2 years (17.6 ttl pk-yrs)     Types: Cigarettes     Start date: 1/1/1986     Quit date: 3/5/2021     Years since quitting: 3.7    Smokeless tobacco: Never   Substance and Sexual Activity    Alcohol use: Yes     Comment: occassional    Drug use: Never    Sexual activity: Not Currently     Birth control/protection: Abstinence     Review of Systems   Constitutional:  Positive for activity change, appetite change and fatigue. Negative for chills, diaphoresis, fever and unexpected weight change.   HENT:  Negative for congestion, nosebleeds, sinus pressure and sore throat.    Eyes:  Negative for pain, discharge and visual disturbance.   Respiratory:  Negative for cough, chest  tightness, shortness of breath, wheezing and stridor.    Cardiovascular:  Negative for chest pain, palpitations and leg swelling.   Gastrointestinal:  Positive for diarrhea. Negative for abdominal distention, abdominal pain, blood in stool, constipation, nausea and vomiting.   Endocrine: Negative for cold intolerance and heat intolerance.   Genitourinary:  Negative for difficulty urinating, dysuria, flank pain, frequency and urgency.   Musculoskeletal:  Negative for arthralgias, back pain, joint swelling, myalgias, neck pain and neck stiffness.   Skin:  Negative for rash and wound.   Allergic/Immunologic: Negative for food allergies and immunocompromised state.   Neurological:  Positive for weakness. Negative for dizziness, seizures, syncope, facial asymmetry, speech difficulty, light-headedness, numbness and headaches.   Hematological:  Negative for adenopathy.   Psychiatric/Behavioral:  Negative for agitation, confusion and hallucinations.      Objective:     Vital Signs (Most Recent):  Temp: 98.4 °F (36.9 °C) (12/04/24 1336)  Pulse: 64 (12/04/24 1600)  Resp: 16 (12/04/24 1600)  BP: (!) 110/56 (12/04/24 1600)  SpO2: 96 % (12/04/24 1500) Vital Signs (24h Range):  Temp:  [98.4 °F (36.9 °C)] 98.4 °F (36.9 °C)  Pulse:  [56-64] 64  Resp:  [16-20] 16  SpO2:  [95 %-99 %] 96 %  BP: (102-132)/(54-62) 110/56     Weight: 82.5 kg (181 lb 14.1 oz)  Body mass index is 34.37 kg/m².     Physical Exam  Vitals and nursing note reviewed.   Constitutional:       General: She is not in acute distress.     Appearance: She is well-developed. She is not diaphoretic.   HENT:      Head: Normocephalic and atraumatic.      Nose: Nose normal.   Eyes:      General: No scleral icterus.     Conjunctiva/sclera: Conjunctivae normal.   Neck:      Trachea: No tracheal deviation.   Cardiovascular:      Rate and Rhythm: Normal rate and regular rhythm.      Heart sounds: Normal heart sounds. No murmur heard.     No friction rub. No gallop.   Pulmonary:       Effort: Pulmonary effort is normal. No respiratory distress.      Breath sounds: Normal breath sounds. No stridor. No wheezing or rales.   Chest:      Chest wall: No tenderness.   Abdominal:      General: Bowel sounds are normal. There is no distension.      Palpations: Abdomen is soft. There is no mass.      Tenderness: There is no abdominal tenderness. There is no guarding or rebound.   Musculoskeletal:         General: No tenderness or deformity. Normal range of motion.      Cervical back: Normal range of motion and neck supple.   Skin:     General: Skin is warm and dry.      Coloration: Skin is not pale.      Findings: No erythema or rash.   Neurological:      Mental Status: She is alert and oriented to person, place, and time.      Cranial Nerves: No cranial nerve deficit.      Motor: No abnormal muscle tone.      Coordination: Coordination normal.   Psychiatric:         Behavior: Behavior normal.         Thought Content: Thought content normal.                Significant Labs: All pertinent labs within the past 24 hours have been reviewed.    Significant Imaging: I have reviewed all pertinent imaging results/findings within the past 24 hours.  Assessment/Plan:     * SHAY (acute kidney injury)  SHAY is likely due to pre-renal azotemia due to dehydration. Baseline creatinine is  0.8 . Most recent creatinine and eGFR are listed below.  Recent Labs     12/04/24  1301   CREATININE 4.4*   EGFRNORACEVR 10*      Plan  - SHAY is worsening. Will continue current treatment  - Avoid nephrotoxins and renally dose meds for GFR listed above  - Monitor urine output, serial BMP, and adjust therapy as needed  Hold home meds Lasix, Lisinopril/HCTZ  IVFs  Start Imodium and/or Lomotil if Cdiff PCR negative     Diarrhea  C diff-PCR pending, stool culture, and stool studies   Check TSh   Start Imodium and/or Lomotil if Cdiff PCR negative     COPD (chronic obstructive pulmonary disease)  Patient's COPD is controlled currently.   "Patient is currently off COPD Pathway. Continue scheduled inhalers  LABA and ICS neb txs  and monitor respiratory status closely.     Diabetes  Patient's FSGs are controlled on current medication regimen.  Last A1c reviewed-   Lab Results   Component Value Date    HGBA1C 6.5 (H) 05/25/2023     Most recent fingerstick glucose reviewed- No results for input(s): "POCTGLUCOSE" in the last 24 hours.  Current correctional scale  Low  Maintain anti-hyperglycemic dose as follows-   Antihyperglycemics (From admission, onward)      Start     Stop Route Frequency Ordered    12/04/24 1850  insulin aspart U-100 pen 0-5 Units         -- SubQ Before meals & nightly PRN 12/04/24 1751          Hold Oral hypoglycemics while patient is in the hospital.    CAD (coronary artery disease)  Patient with known CAD s/p stent placement, which is controlled Will continue ASA, Plavix, and Statin and monitor for S/Sx of angina/ACS. Continue to monitor on telemetry.     Ho    Essential hypertension  Patient's blood pressure range in the last 24 hours was: BP  Min: 102/55  Max: 132/62.The patient's inpatient anti-hypertensive regimen is listed below:  Current Antihypertensives       Plan  - Hold antihypertensives for low normal BP         VTE Risk Mitigation (From admission, onward)           Ordered     heparin (porcine) injection 5,000 Units  Every 8 hours         12/04/24 1751     IP VTE HIGH RISK PATIENT  Once         12/04/24 1751     Place sequential compression device  Until discontinued         12/04/24 1751                                    Yolis Godwin NP  Department of Hospital Medicine  [facility] - Emergency Dept.          "

## 2024-12-04 NOTE — ASSESSMENT & PLAN NOTE
SHAY is likely due to pre-renal azotemia due to dehydration. Baseline creatinine is  0.8 . Most recent creatinine and eGFR are listed below.  Recent Labs     12/04/24  1301   CREATININE 4.4*   EGFRNORACEVR 10*      Plan  - SHAY is worsening. Will continue current treatment  - Avoid nephrotoxins and renally dose meds for GFR listed above  - Monitor urine output, serial BMP, and adjust therapy as needed  Hold home meds Lasix, Lisinopril/HCTZ  IVFs  Start Imodium and/or Lomotil if Cdiff PCR negative

## 2024-12-04 NOTE — ASSESSMENT & PLAN NOTE
Patient's COPD is controlled currently.  Patient is currently off COPD Pathway. Continue scheduled inhalers  LABA and ICS neb txs  and monitor respiratory status closely.

## 2024-12-04 NOTE — ED NOTES
Second call placed to US dept to confirm if full bladder is needed for imaging. No answer.     Pt marked as ready for US as to not delay care.

## 2024-12-04 NOTE — ASSESSMENT & PLAN NOTE
"Patient's FSGs are controlled on current medication regimen.  Last A1c reviewed-   Lab Results   Component Value Date    HGBA1C 6.5 (H) 05/25/2023     Most recent fingerstick glucose reviewed- No results for input(s): "POCTGLUCOSE" in the last 24 hours.  Current correctional scale  Low  Maintain anti-hyperglycemic dose as follows-   Antihyperglycemics (From admission, onward)      Start     Stop Route Frequency Ordered    12/04/24 1850  insulin aspart U-100 pen 0-5 Units         -- SubQ Before meals & nightly PRN 12/04/24 1751          Hold Oral hypoglycemics while patient is in the hospital.  "

## 2024-12-04 NOTE — SUBJECTIVE & OBJECTIVE
Past Medical History:   Diagnosis Date    Anxiety disorder, unspecified     COPD (chronic obstructive pulmonary disease)     Depression     Family history of malignant neoplasm of breast 08/08/2023    Fibrocystic breast     HTN (hypertension)     Hypercholesteremia     Mass of multiple sites of left breast 08/07/2023    Mass of multiple sites of right breast 08/07/2023    Mastodynia 08/08/2023    Mild intermittent asthma, uncomplicated     Pneumonia, unspecified organism     Type 2 diabetes mellitus without complications        Past Surgical History:   Procedure Laterality Date    CLOSED REDUCTION, FINGER, WITH PINNING Right 08/11/2023    Procedure: CLOSED REDUCTION, FINGER, WITH PINNING;  Surgeon: Nawaf Mcclure MD;  Location: [facility] OR;  Service: Orthopedics;  Laterality: Right;  closed reduction and pinning right thumb proximal phalanx fracture    CORONARY ANGIOGRAPHY N/A 03/12/2021    Procedure: ANGIOGRAM, CORONARY ARTERY;  Surgeon: Shruti Harrison MD;  Location: [facility] CATH LAB;  Service: Cardiology;  Laterality: N/A;    CORONARY ANGIOPLASTY WITH STENT PLACEMENT N/A 03/12/2021    Procedure: Angioplasty, Coronary Artery, With Stent Insertion;  Surgeon: Shruti Harrison MD;  Location: [facility] CATH LAB;  Service: Cardiology;  Laterality: N/A;    LEFT HEART CATHETERIZATION Left 03/12/2021    Procedure: CATHETERIZATION, HEART, LEFT;  Surgeon: Shruti Harrison MD;  Location: [facility] CATH LAB;  Service: Cardiology;  Laterality: Left;    REFRACTIVE SURGERY Bilateral 10/2024    Dr Kvng Mercer       Review of patient's allergies indicates:  No Known Allergies    Current Facility-Administered Medications on File Prior to Encounter   Medication    chlorhexidine 0.12 % solution 10 mL     Current Outpatient Medications on File Prior to Encounter   Medication Sig    amLODIPine (NORVASC) 10 MG tablet Take 10 mg by mouth once daily.    atorvastatin (LIPITOR) 80 MG tablet Take 1 tablet (80 mg total) by mouth every evening.     budesonide-glycopyr-formoterol 160-9-4.8 mcg/actuation HFAA Inhale 2 puffs into the lungs 2 (two) times daily. Wash out mouth after use.    cholecalciferol, vitamin D3, 1,250 mcg (50,000 unit) capsule Take 50,000 Units by mouth every 7 days.    clopidogreL (PLAVIX) 75 mg tablet TAKE 1 TABLET EVERY DAY    ezetimibe (ZETIA) 10 mg tablet Take 10 mg by mouth once daily.    furosemide (LASIX) 20 MG tablet Take 1 tablet (20 mg total) by mouth daily as needed (swelling).    gabapentin (NEURONTIN) 300 MG capsule Take 300 mg by mouth 3 (three) times daily.    lisinopriL-hydrochlorothiazide (PRINZIDE,ZESTORETIC) 20-12.5 mg per tablet Take 1 tablet by mouth 2 (two) times a day.    meloxicam (MOBIC) 15 MG tablet Take 1 tablet (15 mg total) by mouth once daily.    metFORMIN (GLUCOPHAGE-XR) 500 MG ER 24hr tablet Take 500 mg by mouth once daily.    metoprolol succinate (TOPROL-XL) 100 MG 24 hr tablet Take 1 tablet (100 mg total) by mouth once daily.    pantoprazole (PROTONIX) 40 MG tablet Take 40 mg by mouth once daily.    rifAXIMin (XIFAXAN) 550 mg Tab Take 550 mg by mouth 3 (three) times daily.    sertraline (ZOLOFT) 100 MG tablet Take 100 mg by mouth once daily.    albuterol (PROVENTIL/VENTOLIN HFA) 90 mcg/actuation inhaler Inhale 2 puffs into the lungs every 4 (four) hours as needed for Wheezing or Shortness of Breath. Rescue    loratadine (CLARITIN) 10 mg tablet Take 10 mg by mouth daily as needed.    ondansetron (ZOFRAN) 4 MG tablet Take 1 tablet (4 mg total) by mouth every 6 (six) hours as needed for Nausea.    TRUE METRIX GLUCOSE TEST STRIP Strp SMARTSIG:Via Meter    TRUEPLUS LANCETS 33 gauge Misc 1 lancet  by Each Nostril route 2 (two) times a day.     Family History       Problem Relation (Age of Onset)    Breast cancer Sister, Sister (74), Maternal Aunt, Maternal Aunt    COPD Father    Cancer Mother, Father    Diabetes Mother    Hypertension Mother          Tobacco Use    Smoking status: Former     Current packs/day:  0.00     Average packs/day: 0.5 packs/day for 35.2 years (17.6 ttl pk-yrs)     Types: Cigarettes     Start date: 1/1/1986     Quit date: 3/5/2021     Years since quitting: 3.7    Smokeless tobacco: Never   Substance and Sexual Activity    Alcohol use: Yes     Comment: occassional    Drug use: Never    Sexual activity: Not Currently     Birth control/protection: Abstinence     Review of Systems   Constitutional:  Positive for activity change, appetite change and fatigue. Negative for chills, diaphoresis, fever and unexpected weight change.   HENT:  Negative for congestion, nosebleeds, sinus pressure and sore throat.    Eyes:  Negative for pain, discharge and visual disturbance.   Respiratory:  Negative for cough, chest tightness, shortness of breath, wheezing and stridor.    Cardiovascular:  Negative for chest pain, palpitations and leg swelling.   Gastrointestinal:  Positive for diarrhea. Negative for abdominal distention, abdominal pain, blood in stool, constipation, nausea and vomiting.   Endocrine: Negative for cold intolerance and heat intolerance.   Genitourinary:  Negative for difficulty urinating, dysuria, flank pain, frequency and urgency.   Musculoskeletal:  Negative for arthralgias, back pain, joint swelling, myalgias, neck pain and neck stiffness.   Skin:  Negative for rash and wound.   Allergic/Immunologic: Negative for food allergies and immunocompromised state.   Neurological:  Positive for weakness. Negative for dizziness, seizures, syncope, facial asymmetry, speech difficulty, light-headedness, numbness and headaches.   Hematological:  Negative for adenopathy.   Psychiatric/Behavioral:  Negative for agitation, confusion and hallucinations.      Objective:     Vital Signs (Most Recent):  Temp: 98.4 °F (36.9 °C) (12/04/24 1336)  Pulse: 64 (12/04/24 1600)  Resp: 16 (12/04/24 1600)  BP: (!) 110/56 (12/04/24 1600)  SpO2: 96 % (12/04/24 1500) Vital Signs (24h Range):  Temp:  [98.4 °F (36.9 °C)] 98.4 °F  (36.9 °C)  Pulse:  [56-64] 64  Resp:  [16-20] 16  SpO2:  [95 %-99 %] 96 %  BP: (102-132)/(54-62) 110/56     Weight: 82.5 kg (181 lb 14.1 oz)  Body mass index is 34.37 kg/m².     Physical Exam  Vitals and nursing note reviewed.   Constitutional:       General: She is not in acute distress.     Appearance: She is well-developed. She is not diaphoretic.   HENT:      Head: Normocephalic and atraumatic.      Nose: Nose normal.   Eyes:      General: No scleral icterus.     Conjunctiva/sclera: Conjunctivae normal.   Neck:      Trachea: No tracheal deviation.   Cardiovascular:      Rate and Rhythm: Normal rate and regular rhythm.      Heart sounds: Normal heart sounds. No murmur heard.     No friction rub. No gallop.   Pulmonary:      Effort: Pulmonary effort is normal. No respiratory distress.      Breath sounds: Normal breath sounds. No stridor. No wheezing or rales.   Chest:      Chest wall: No tenderness.   Abdominal:      General: Bowel sounds are normal. There is no distension.      Palpations: Abdomen is soft. There is no mass.      Tenderness: There is no abdominal tenderness. There is no guarding or rebound.   Musculoskeletal:         General: No tenderness or deformity. Normal range of motion.      Cervical back: Normal range of motion and neck supple.   Skin:     General: Skin is warm and dry.      Coloration: Skin is not pale.      Findings: No erythema or rash.   Neurological:      Mental Status: She is alert and oriented to person, place, and time.      Cranial Nerves: No cranial nerve deficit.      Motor: No abnormal muscle tone.      Coordination: Coordination normal.   Psychiatric:         Behavior: Behavior normal.         Thought Content: Thought content normal.                Significant Labs: All pertinent labs within the past 24 hours have been reviewed.    Significant Imaging: I have reviewed all pertinent imaging results/findings within the past 24 hours.

## 2024-12-04 NOTE — ASSESSMENT & PLAN NOTE
Patient with known CAD s/p stent placement, which is controlled Will continue ASA, Plavix, and Statin and monitor for S/Sx of angina/ACS. Continue to monitor on telemetry.     Ho

## 2024-12-04 NOTE — ASSESSMENT & PLAN NOTE
C diff-PCR pending, stool culture, and stool studies   Check TSh   Start Imodium and/or Lomotil if Cdiff PCR negative

## 2024-12-04 NOTE — HPI
The patient is a 68 yo female with CAD s/p stent, COPD, RONALDO, HTN, DM who presented to ED with non bloody diarrhea x 4 days with associated decreased appetite, nausea, generalized weakness, and fatigue. Pt reports several stools per day. She denies abdominal pain, fever, chills. Symptoms unrelieved with imodium     In the ED, Afebrile, low normal BP labs revealed SHAY with serum Cr 4.4. Cdiff +antigen, negative toxin. PCR pending. ?UTI with 3+ leukocytes but only 8 WBC. Abd Xray showed nonobstructive bowel gas pattern. Renal U/S showed medical renal disease.

## 2024-12-04 NOTE — ASSESSMENT & PLAN NOTE
Patient's blood pressure range in the last 24 hours was: BP  Min: 102/55  Max: 132/62.The patient's inpatient anti-hypertensive regimen is listed below:  Current Antihypertensives       Plan  - Hold antihypertensives for low normal BP

## 2024-12-04 NOTE — PHARMACY MED REC
"Admission Medication History     The home medication history was taken by Rigo Bonds.    You may go to "Admission" then "Reconcile Home Medications" tabs to review and/or act upon these items.     The home medication list has been updated by the Pharmacy department.   Please read ALL comments highlighted in yellow.   Please address this information as you see fit.    Feel free to contact us if you have any questions or require assistance.      Medications listed below were obtained from: Patient/family and Analytic software- [employer]  (Not in a hospital admission)    Rigo Bonds  [id number]    Current Outpatient Medications on File Prior to Encounter   Medication Sig Dispense Refill Last Dose/Taking    amLODIPine (NORVASC) 10 MG tablet Take 10 mg by mouth once daily.   12/4/2024    atorvastatin (LIPITOR) 80 MG tablet Take 1 tablet (80 mg total) by mouth every evening. 30 tablet 11 12/3/2024    budesonide-glycopyr-formoterol 160-9-4.8 mcg/actuation HFAA Inhale 2 puffs into the lungs 2 (two) times daily. Wash out mouth after use. 10.7 g 11 12/4/2024    cholecalciferol, vitamin D3, 1,250 mcg (50,000 unit) capsule Take 50,000 Units by mouth every 7 days.   Past Week    clopidogreL (PLAVIX) 75 mg tablet TAKE 1 TABLET EVERY DAY 90 tablet 3 12/4/2024    ezetimibe (ZETIA) 10 mg tablet Take 10 mg by mouth once daily.   12/4/2024    gabapentin (NEURONTIN) 300 MG capsule Take 300 mg by mouth 3 (three) times daily.   12/4/2024    lisinopriL-hydrochlorothiazide (PRINZIDE,ZESTORETIC) 20-12.5 mg per tablet Take 1 tablet by mouth 2 (two) times a day.   12/4/2024    meloxicam (MOBIC) 15 MG tablet Take 1 tablet (15 mg total) by mouth once daily. 30 tablet 2 12/4/2024    metFORMIN (GLUCOPHAGE-XR) 500 MG ER 24hr tablet Take 500 mg by mouth once daily.   12/4/2024    metoprolol succinate (TOPROL-XL) 100 MG 24 hr tablet Take 1 tablet (100 mg total) by mouth once daily. 30 tablet 11 12/4/2024    pantoprazole (PROTONIX) 40 MG " tablet Take 40 mg by mouth once daily.   12/4/2024    rifAXIMin (XIFAXAN) 550 mg Tab Take 550 mg by mouth 3 (three) times daily.   12/4/2024    albuterol (PROVENTIL/VENTOLIN HFA) 90 mcg/actuation inhaler Inhale 2 puffs into the lungs every 4 (four) hours as needed for Wheezing or Shortness of Breath. Rescue 54 g 3     furosemide (LASIX) 20 MG tablet Take 1 tablet (20 mg total) by mouth daily as needed (swelling). 30 tablet 11     loratadine (CLARITIN) 10 mg tablet Take 10 mg by mouth daily as needed.       ondansetron (ZOFRAN) 4 MG tablet Take 1 tablet (4 mg total) by mouth every 6 (six) hours as needed for Nausea. 12 tablet 0     sertraline (ZOLOFT) 100 MG tablet Take 100 mg by mouth once daily.       TRUE METRIX GLUCOSE TEST STRIP Strp SMARTSIG:Via Meter       TRUEPLUS LANCETS 33 gauge Misc 1 lancet  by Each Nostril route 2 (two) times a day.                              .

## 2024-12-04 NOTE — ED PROVIDER NOTES
SCRIBE #1 NOTE: I, Dali Faulkner, am scribing for, and in the presence of, Irish Cochran MD. I have scribed the entire note.       History     Chief Complaint   Patient presents with    General Illness     Severe diarrhea since Sunday, fatigue, n/v, decrease in appetite, and generalized body aches. Pt states she feels extremely dehydrated, denies CP.      Review of patient's allergies indicates:  No Known Allergies      History of Present Illness     HPI    12/4/2024, 12:19 PM  History obtained from the patient      History of Present Illness: Jacqueline Luna is a 69 y.o. female patient with a PMHx of COPD, HTN, asthma, pneumonia, DM II, and mastodynia who presents to the Emergency Department for evaluation of diarrhea which began 3 days ago. Pt is unable to quantify episodes due to constant presentation. Diarrhea is watery and non-bloody. No mitigating or exacerbating factors reported. Associated sxs include poor appetite, nausea, fever, sweating, and abdominal pain. Unsure of fever temp. Patient denies any vomiting. Prior Tx includes Zofran for nausea and Imodium for diarrhea. Pt reports taking Imodium 3x without symptoms relief. She denies treatment with Abx within last month. No further complaints or concerns at this time.       Arrival mode: Personal Transportation    PCP: Claudio Christianson Jr., MD        Past Medical History:  Past Medical History:   Diagnosis Date    Anxiety disorder, unspecified     COPD (chronic obstructive pulmonary disease)     Depression     Family history of malignant neoplasm of breast 08/08/2023    Fibrocystic breast     HTN (hypertension)     Hypercholesteremia     Mass of multiple sites of left breast 08/07/2023    Mass of multiple sites of right breast 08/07/2023    Mastodynia 08/08/2023    Mild intermittent asthma, uncomplicated     Pneumonia, unspecified organism     Type 2 diabetes mellitus without complications        Past Surgical History:  Past Surgical History:    Procedure Laterality Date    CLOSED REDUCTION, FINGER, WITH PINNING Right 08/11/2023    Procedure: CLOSED REDUCTION, FINGER, WITH PINNING;  Surgeon: Nawaf Mcclure MD;  Location: [facility] OR;  Service: Orthopedics;  Laterality: Right;  closed reduction and pinning right thumb proximal phalanx fracture    CORONARY ANGIOGRAPHY N/A 03/12/2021    Procedure: ANGIOGRAM, CORONARY ARTERY;  Surgeon: Shruti Harrison MD;  Location: [facility] CATH LAB;  Service: Cardiology;  Laterality: N/A;    CORONARY ANGIOPLASTY WITH STENT PLACEMENT N/A 03/12/2021    Procedure: Angioplasty, Coronary Artery, With Stent Insertion;  Surgeon: Shruti Harrison MD;  Location: [facility] CATH LAB;  Service: Cardiology;  Laterality: N/A;    LEFT HEART CATHETERIZATION Left 03/12/2021    Procedure: CATHETERIZATION, HEART, LEFT;  Surgeon: Shruti Harrison MD;  Location: [facility] CATH LAB;  Service: Cardiology;  Laterality: Left;    REFRACTIVE SURGERY Bilateral 10/2024    Dr Kvng Mercer         Family History:  Family History   Problem Relation Name Age of Onset    Diabetes Mother      Hypertension Mother      Cancer Mother      Cancer Father      COPD Father      Breast cancer Sister          dx 40's    Breast cancer Sister  74    Breast cancer Maternal Aunt      Breast cancer Maternal Aunt      Ovarian cancer Neg Hx         Social History:  Social History     Tobacco Use    Smoking status: Former     Current packs/day: 0.00     Average packs/day: 0.5 packs/day for 35.2 years (17.6 ttl pk-yrs)     Types: Cigarettes     Start date: 1/1/1986     Quit date: 3/5/2021     Years since quitting: 3.7    Smokeless tobacco: Never   Substance and Sexual Activity    Alcohol use: Yes     Comment: occassional    Drug use: Never    Sexual activity: Not Currently     Birth control/protection: Abstinence        Review of Systems     Review of Systems   Constitutional:  Positive for appetite change (poor) and fever.        (+) sweating   HENT:  Negative for sore throat.     Respiratory:  Negative for shortness of breath.    Cardiovascular:  Negative for chest pain.   Gastrointestinal:  Positive for abdominal pain and diarrhea (watery). Negative for blood in stool, nausea and vomiting.   Genitourinary:  Negative for dysuria.   Musculoskeletal:  Negative for back pain.   Skin:  Negative for rash.   Neurological:  Negative for weakness.   Hematological:  Does not bruise/bleed easily.   All other systems reviewed and are negative.     Physical Exam     Initial Vitals [12/04/24 1215]   BP Pulse Resp Temp SpO2   (!) 102/55 63 19 98.4 °F (36.9 °C) 99 %      MAP       --          Physical Exam  Nursing Notes and Vital Signs Reviewed.  Constitutional: Patient is in no apparent distress. Does not appear acutely toxic. Morbidly obese.  Head: Atraumatic. Normocephalic.  Eyes: PERRL. EOM intact. Conjunctivae are not pale. No scleral icterus.  ENT: Mucous membranes are moist. Oropharynx is clear and symmetric.    Neck: Supple. Full ROM. No lymphadenopathy.  Cardiovascular: Regular rate. Regular rhythm. No murmurs, rubs, or gallops. Distal pulses are 2+ and symmetric.  Pulmonary/Chest: No respiratory distress. Clear to auscultation bilaterally. No wheezing or rales.  Abdominal: Soft and non-distended.  Mild mid abdominal tenderness.  No rebound, guarding, or rigidity. Good bowel sounds.  Genitourinary: No CVA tenderness  Musculoskeletal: Moves all extremities. No obvious deformities. No edema. No calf tenderness.  Skin: Warm and dry.  Neurological:  Alert, awake, and appropriate.  Normal speech.  No acute focal neurological deficits are appreciated.  Psychiatric: Normal affect. Good eye contact. Appropriate in content.     ED Course   Critical Care    Date/Time: 12/4/2024 2:02 PM    Performed by: Irish Cochran MD  Authorized by: Irish Cochran MD  Direct patient critical care time: 40 minutes  Additional history critical care time: 6 minutes  Ordering / reviewing critical care time: 7  "minutes  Documentation critical care time: 6 minutes  Consulting other physicians critical care time: 5 minutes  Total critical care time (exclusive of procedural time) : 64 minutes  Critical care was necessary to treat or prevent imminent or life-threatening deterioration of the following conditions: renal failure and dehydration.  Critical care was time spent personally by me on the following activities: blood draw for specimens, development of treatment plan with patient or surrogate, discussions with consultants, interpretation of cardiac output measurements, evaluation of patient's response to treatment, examination of patient, obtaining history from patient or surrogate, ordering and performing treatments and interventions, ordering and review of laboratory studies, ordering and review of radiographic studies, pulse oximetry, re-evaluation of patient's condition and review of old charts.        ED Vital Signs:  Vitals:    12/04/24 1215 12/04/24 1230 12/04/24 1315 12/04/24 1318   BP: (!) 102/55 (!) 107/56 (!) 108/54    Pulse: 63 61 (!) 56    Resp: 19      Temp: 98.4 °F (36.9 °C)      TempSrc: Oral      SpO2: 99% 98% 95%    Weight:    82.5 kg (181 lb 12.8 oz)   Height:        12/04/24 1331 12/04/24 1336 12/04/24 1400 12/04/24 1500   BP:  (!) 126/57 (!) 115/56 132/62   Pulse: 61 (!) 57 (!) 59 63   Resp:  20 20 20   Temp:  98.4 °F (36.9 °C)     TempSrc:  Oral     SpO2:  97% 98% 96%   Weight:  82.5 kg (181 lb 14.1 oz)     Height:  5' 1" (1.549 m)         Abnormal Lab Results:  Labs Reviewed   CLOSTRIDIUM DIFFICILE - Abnormal       Result Value    C. diff Antigen Positive (*)     C difficile Toxins A+B, EIA Negative     CBC W/ AUTO DIFFERENTIAL - Abnormal    WBC 11.07      RBC 5.28      Hemoglobin 13.1      Hematocrit 40.8      MCV 77 (*)     MCH 24.8 (*)     MCHC 32.1      RDW 15.4 (*)     Platelets 292      MPV 9.4      Immature Granulocytes 0.4      Gran # (ANC) 6.4      Immature Grans (Abs) 0.04      Lymph # 3.1 "      Mono # 1.3 (*)     Eos # 0.2      Baso # 0.04      nRBC 0      Gran % 57.5      Lymph % 28.2      Mono % 11.8      Eosinophil % 1.7      Basophil % 0.4      Differential Method Automated     COMPREHENSIVE METABOLIC PANEL - Abnormal    Sodium 137      Potassium 3.6      Chloride 102      CO2 21 (*)     Glucose 104      BUN 47 (*)     Creatinine 4.4 (*)     Calcium 9.7      Total Protein 7.9      Albumin 3.6      Total Bilirubin 0.2      Alkaline Phosphatase 109      AST 17      ALT 13      eGFR 10 (*)     Anion Gap 14     URINALYSIS, REFLEX TO URINE CULTURE - Abnormal    Specimen UA Urine, Clean Catch      Color, UA Yellow      Appearance, UA Cloudy (*)     pH, UA 5.0      Specific Gravity, UA 1.015      Protein, UA 1+ (*)     Glucose, UA Negative      Ketones, UA Negative      Bilirubin (UA) Negative      Occult Blood UA Trace (*)     Nitrite, UA Negative      Urobilinogen, UA Negative      Leukocytes, UA 3+ (*)     Narrative:     Specimen Source->Urine   URINALYSIS MICROSCOPIC - Abnormal    RBC, UA 5 (*)     WBC, UA 8 (*)     Bacteria Occasional      Squam Epithel, UA 61      Hyaline Casts, UA 59 (*)     Microscopic Comment SEE COMMENT      Narrative:     Specimen Source->Urine   CULTURE, STOOL   ENTEROHEMORRHAGIC E.COLI   C DIFF TOXIN BY PCR   LIPASE    Lipase 45     LACTIC ACID, PLASMA    Lactate (Lactic Acid) 0.9     OCCULT BLOOD X 1, STOOL    Occult Blood Negative     ROTAVIRUS ANTIGEN, STOOL    Rotavirus Negative     MAGNESIUM    Magnesium 2.4     WBC, STOOL   CALPROTECTIN, STOOL   GIARDIA/CRYPTOSPORIDIUM (EIA)   STOOL EXAM-OVA,CYSTS,PARASITES        All Lab Results:  Results for orders placed or performed during the hospital encounter of 12/04/24   CBC W/ AUTO DIFFERENTIAL    Collection Time: 12/04/24  1:01 PM   Result Value Ref Range    WBC 11.07 3.90 - 12.70 K/uL    RBC 5.28 4.00 - 5.40 M/uL    Hemoglobin 13.1 12.0 - 16.0 g/dL    Hematocrit 40.8 37.0 - 48.5 %    MCV 77 (L) 82 - 98 fL    MCH 24.8 (L)  27.0 - 31.0 pg    MCHC 32.1 32.0 - 36.0 g/dL    RDW 15.4 (H) 11.5 - 14.5 %    Platelets 292 150 - 450 K/uL    MPV 9.4 9.2 - 12.9 fL    Immature Granulocytes 0.4 0.0 - 0.5 %    Gran # (ANC) 6.4 1.8 - 7.7 K/uL    Immature Grans (Abs) 0.04 0.00 - 0.04 K/uL    Lymph # 3.1 1.0 - 4.8 K/uL    Mono # 1.3 (H) 0.3 - 1.0 K/uL    Eos # 0.2 0.0 - 0.5 K/uL    Baso # 0.04 0.00 - 0.20 K/uL    nRBC 0 0 /100 WBC    Gran % 57.5 38.0 - 73.0 %    Lymph % 28.2 18.0 - 48.0 %    Mono % 11.8 4.0 - 15.0 %    Eosinophil % 1.7 0.0 - 8.0 %    Basophil % 0.4 0.0 - 1.9 %    Differential Method Automated    Comp. Metabolic Panel    Collection Time: 12/04/24  1:01 PM   Result Value Ref Range    Sodium 137 136 - 145 mmol/L    Potassium 3.6 3.5 - 5.1 mmol/L    Chloride 102 95 - 110 mmol/L    CO2 21 (L) 23 - 29 mmol/L    Glucose 104 70 - 110 mg/dL    BUN 47 (H) 8 - 23 mg/dL    Creatinine 4.4 (H) 0.5 - 1.4 mg/dL    Calcium 9.7 8.7 - 10.5 mg/dL    Total Protein 7.9 6.0 - 8.4 g/dL    Albumin 3.6 3.5 - 5.2 g/dL    Total Bilirubin 0.2 0.1 - 1.0 mg/dL    Alkaline Phosphatase 109 40 - 150 U/L    AST 17 10 - 40 U/L    ALT 13 10 - 44 U/L    eGFR 10 (A) >60 mL/min/1.73 m^2    Anion Gap 14 8 - 16 mmol/L   Lipase    Collection Time: 12/04/24  1:01 PM   Result Value Ref Range    Lipase 45 4 - 60 U/L   Lactic acid, plasma    Collection Time: 12/04/24  1:01 PM   Result Value Ref Range    Lactate (Lactic Acid) 0.9 0.5 - 2.2 mmol/L   Magnesium    Collection Time: 12/04/24  1:01 PM   Result Value Ref Range    Magnesium 2.4 1.6 - 2.6 mg/dL   Occult blood x 1, stool    Collection Time: 12/04/24  1:14 PM    Specimen: Stool   Result Value Ref Range    Occult Blood Negative Negative   Rotavirus antigen, stool    Collection Time: 12/04/24  1:14 PM   Result Value Ref Range    Rotavirus Negative Negative   Clostridium difficile EIA    Collection Time: 12/04/24  1:18 PM    Specimen: Stool   Result Value Ref Range    C. diff Antigen Positive (A) Negative    C difficile Toxins  A+B, EIA Negative Negative   Urinalysis, Reflex to Urine Culture Urine, Clean Catch    Collection Time: 12/04/24  3:12 PM    Specimen: Urine   Result Value Ref Range    Specimen UA Urine, Clean Catch     Color, UA Yellow Yellow, Straw, Judith    Appearance, UA Cloudy (A) Clear    pH, UA 5.0 5.0 - 8.0    Specific Gravity, UA 1.015 1.005 - 1.030    Protein, UA 1+ (A) Negative    Glucose, UA Negative Negative    Ketones, UA Negative Negative    Bilirubin (UA) Negative Negative    Occult Blood UA Trace (A) Negative    Nitrite, UA Negative Negative    Urobilinogen, UA Negative <2.0 EU/dL    Leukocytes, UA 3+ (A) Negative   Urinalysis Microscopic    Collection Time: 12/04/24  3:12 PM   Result Value Ref Range    RBC, UA 5 (H) 0 - 4 /hpf    WBC, UA 8 (H) 0 - 5 /hpf    Bacteria Occasional None-Occ /hpf    Squam Epithel, UA 61 /hpf    Hyaline Casts, UA 59 (A) 0-1/lpf /lpf    Microscopic Comment SEE COMMENT        Imaging Results:  Imaging Results              X-Ray Abdomen Flat And Erect (Final result)  Result time 12/04/24 15:08:46      Final result by Salomón Gomez MD (12/04/24 15:08:46)                   Impression:      Nonobstructive bowel gas pattern.      Electronically signed by: Salomón Gomez MD  Date:    12/04/2024  Time:    15:08               Narrative:    EXAMINATION:  XR ABDOMEN FLAT AND ERECT    CLINICAL HISTORY:  Generalized abdominal pain    COMPARISON:  none    FINDINGS:  Nonobstructive bowel gas pattern is noted. No radiopaque kidney calculus is identified.  There are multiple calcifications in the pelvis most consistent with phleboliths.  No evidence of organomegaly.  The bones demonstrate mild degenerative changes within the lower lumbar region.  The bones are otherwise intact.  Mild dependent changes at the lung bases.                                       US Retroperitoneal Complete (Final result)  Result time 12/04/24 14:36:35   Procedure changed from US Kidney     Final result by Salomón Gomez,  MD (12/04/24 14:36:35)                   Impression:      Findings consistent with medical renal disease.      Electronically signed by: Salomón Gomez MD  Date:    12/04/2024  Time:    14:36               Narrative:    EXAMINATION:  US RETROPERITONEAL COMPLETE    CLINICAL HISTORY:  acute renal failure;    TECHNIQUE:  Ultrasound of the kidneys and urinary bladder was performed including color flow and Doppler evaluation of the kidneys.    COMPARISON:  None.    FINDINGS:  Right kidney: The right kidney measures 9 cm. Mild cortical thinning. No loss of corticomedullary distinction.  Slightly decreased perfusion with elevated RI of 0.83.  No mass. No renal stone. No hydronephrosis.    Left kidney: The left kidney measures 10.9 cm. Mild cortical thinning.  Incidental dromedary hump suspected within the left kidney.  No loss of corticomedullary distinction.  Slightly decreased perfusion with elevated RI of 0.8 . No mass. No renal stone. No hydronephrosis.    The bladder is partially distended at the time of scanning and has an unremarkable appearance.                                       The EKG was ordered, reviewed, and independently interpreted by the ED provider.  Interpretation time: 13:33  Rate: 56 BPM  Rhythm: sinus bradycardia  Interpretation: Possible lateral infarct, age undetermined. Inferior infarct, age undetermined. No STEMI.           The Emergency Provider reviewed the vital signs and test results, which are outlined above.     ED Discussion     3:40 PM: Discussed case with Yolis Godwin NP ([facility]) who agrees with current care and management of pt and accepts admission.   Admitting Service: Hospital Medicine  Admitting Physician: Dr. Mireles  Admit to: Med/Tele    3:43 PM: Re-evaluated pt.  I have discussed test results, shared treatment plan, and the need for admission with patient/family at bedside. Pt/family express understanding at this time and agree with all information. All questions  answered. Pt/family member(s) have no further questions or concerns at this time. Pt is ready for admit.    ED Course as of 12/04/24 1559   Wed Dec 04, 2024   1450 WBC: 11.07 [AB]   1450 Hemoglobin: 13.1 [AB]   1450 Hematocrit: 40.8 [AB]   1450 Platelet Count: 292 [AB]   1450 BUN(!): 47 [AB]   1450 Creatinine(!): 4.4 [AB]   1450 Potassium: 3.6 [AB]   1450 C. diff Antigen(!): Positive [AB]   1450 C difficile Toxins A+B, EIA: Negative [AB]   1450 Rotavirus: Negative [AB]   1450 Occult Blood: Negative [AB]   1450 Magnesium : 2.4 [AB]   1450 Lipase: 45  Lab work notable for SHAY likely from vol depletion from intractable diarrhea and decreased po intake, she is c.diff antigen positive but toxin negative, lactic acid normal, IV fluids initiated, renal US consistent with medical renal disease, xray abdomen reviewed and no concerns for obstruction or other acute process, ECG reviewed and no acute ischemic changes noted, patient will need admission for SHAY [AB]      ED Course User Index  [AB] Irish Cochran MD   UA is abnormal, significantly contaminated with large amount of squamous cells, will need cath urine and recollection.     Medical Decision Making  DDX: 1. Dehydration 2. C. Diff 3. Viral Syndrome    Amount and/or Complexity of Data Reviewed  Labs: ordered. Decision-making details documented in ED Course.  Radiology: ordered. Decision-making details documented in ED Course.  ECG/medicine tests: ordered and independent interpretation performed. Decision-making details documented in ED Course.    Risk  Prescription drug management.  Decision regarding hospitalization.                ED Medication(s):  Medications   sodium chloride 0.9% bolus 1,000 mL 1,000 mL (0 mLs Intravenous Stopped 12/4/24 1500)   lactated ringers infusion (1,000 mLs Intravenous New Bag 12/4/24 1512)       New Prescriptions    No medications on file               Scribe Attestation:   Scribe #1: I performed the above scribed service and the  documentation accurately describes the services I performed. I attest to the accuracy of the note.     Attending:   Physician Attestation Statement for Scribe #1: I, Irish Cochran MD, personally performed the services described in this documentation, as scribed by Dali Faulkner, in my presence, and it is both accurate and complete.           Clinical Impression       ICD-10-CM ICD-9-CM   1. Acute renal failure, unspecified acute renal failure type  N17.9 584.9   2. Upper abdominal pain  R10.10 789.09   3. Generalized abdominal pain  R10.84 789.07   4. Clostridium difficile diarrhea  A04.72 008.45   5. Clostridium difficile carrier  Z22.1 V02.3       Disposition:   Disposition: Admitted  Condition: Fair       Irish Cochran MD  12/04/24 1559       Irish Cochran MD  12/04/24 1600

## 2024-12-05 LAB
ALBUMIN SERPL BCP-MCNC: 3.1 G/DL (ref 3.5–5.2)
ALP SERPL-CCNC: 95 U/L (ref 40–150)
ALT SERPL W/O P-5'-P-CCNC: 10 U/L (ref 10–44)
ANION GAP SERPL CALC-SCNC: 12 MMOL/L (ref 8–16)
AST SERPL-CCNC: 14 U/L (ref 10–40)
BASOPHILS # BLD AUTO: 0.03 K/UL (ref 0–0.2)
BASOPHILS NFR BLD: 0.3 % (ref 0–1.9)
BILIRUB SERPL-MCNC: 0.2 MG/DL (ref 0.1–1)
BUN SERPL-MCNC: 39 MG/DL (ref 8–23)
C DIFF TOX GENS STL QL NAA+PROBE: NEGATIVE
CALCIUM SERPL-MCNC: 8.7 MG/DL (ref 8.7–10.5)
CHLORIDE SERPL-SCNC: 111 MMOL/L (ref 95–110)
CO2 SERPL-SCNC: 17 MMOL/L (ref 23–29)
CREAT SERPL-MCNC: 1.7 MG/DL (ref 0.5–1.4)
CRYPTOSP AG STL QL IA: NEGATIVE
DIFFERENTIAL METHOD BLD: ABNORMAL
EOSINOPHIL # BLD AUTO: 0.2 K/UL (ref 0–0.5)
EOSINOPHIL NFR BLD: 1.7 % (ref 0–8)
ERYTHROCYTE [DISTWIDTH] IN BLOOD BY AUTOMATED COUNT: 15.7 % (ref 11.5–14.5)
EST. GFR  (NO RACE VARIABLE): 32 ML/MIN/1.73 M^2
G LAMBLIA AG STL QL IA: NEGATIVE
GLUCOSE SERPL-MCNC: 92 MG/DL (ref 70–110)
HCT VFR BLD AUTO: 38.8 % (ref 37–48.5)
HGB BLD-MCNC: 11.9 G/DL (ref 12–16)
IMM GRANULOCYTES # BLD AUTO: 0.03 K/UL (ref 0–0.04)
IMM GRANULOCYTES NFR BLD AUTO: 0.3 % (ref 0–0.5)
LYMPHOCYTES # BLD AUTO: 4 K/UL (ref 1–4.8)
LYMPHOCYTES NFR BLD: 40.8 % (ref 18–48)
MAGNESIUM SERPL-MCNC: 2.4 MG/DL (ref 1.6–2.6)
MCH RBC QN AUTO: 24.1 PG (ref 27–31)
MCHC RBC AUTO-ENTMCNC: 30.7 G/DL (ref 32–36)
MCV RBC AUTO: 79 FL (ref 82–98)
MONOCYTES # BLD AUTO: 0.9 K/UL (ref 0.3–1)
MONOCYTES NFR BLD: 9.6 % (ref 4–15)
NEUTROPHILS # BLD AUTO: 4.6 K/UL (ref 1.8–7.7)
NEUTROPHILS NFR BLD: 47.3 % (ref 38–73)
NRBC BLD-RTO: 0 /100 WBC
PHOSPHATE SERPL-MCNC: 4 MG/DL (ref 2.7–4.5)
PLATELET # BLD AUTO: 264 K/UL (ref 150–450)
PMV BLD AUTO: 9.8 FL (ref 9.2–12.9)
POCT GLUCOSE: 108 MG/DL (ref 70–110)
POCT GLUCOSE: 111 MG/DL (ref 70–110)
POCT GLUCOSE: 128 MG/DL (ref 70–110)
POTASSIUM SERPL-SCNC: 4 MMOL/L (ref 3.5–5.1)
PROT SERPL-MCNC: 6.3 G/DL (ref 6–8.4)
RBC # BLD AUTO: 4.93 M/UL (ref 4–5.4)
SODIUM SERPL-SCNC: 140 MMOL/L (ref 136–145)
T4 FREE SERPL-MCNC: 0.86 NG/DL (ref 0.71–1.51)
TSH SERPL DL<=0.005 MIU/L-ACNC: 1.04 UIU/ML (ref 0.4–4)
WBC # BLD AUTO: 9.67 K/UL (ref 3.9–12.7)
WBC #/AREA STL HPF: NORMAL /[HPF]

## 2024-12-05 PROCEDURE — 63600175 PHARM REV CODE 636 W HCPCS: Performed by: NURSE PRACTITIONER

## 2024-12-05 PROCEDURE — 94761 N-INVAS EAR/PLS OXIMETRY MLT: CPT

## 2024-12-05 PROCEDURE — 85025 COMPLETE CBC W/AUTO DIFF WBC: CPT | Performed by: NURSE PRACTITIONER

## 2024-12-05 PROCEDURE — 94640 AIRWAY INHALATION TREATMENT: CPT

## 2024-12-05 PROCEDURE — 97161 PT EVAL LOW COMPLEX 20 MIN: CPT

## 2024-12-05 PROCEDURE — 21400001 HC TELEMETRY ROOM

## 2024-12-05 PROCEDURE — 25000242 PHARM REV CODE 250 ALT 637 W/ HCPCS: Performed by: NURSE PRACTITIONER

## 2024-12-05 PROCEDURE — 97166 OT EVAL MOD COMPLEX 45 MIN: CPT

## 2024-12-05 PROCEDURE — 80053 COMPREHEN METABOLIC PANEL: CPT | Performed by: NURSE PRACTITIONER

## 2024-12-05 PROCEDURE — 84443 ASSAY THYROID STIM HORMONE: CPT | Performed by: NURSE PRACTITIONER

## 2024-12-05 PROCEDURE — 36415 COLL VENOUS BLD VENIPUNCTURE: CPT | Performed by: NURSE PRACTITIONER

## 2024-12-05 PROCEDURE — 83735 ASSAY OF MAGNESIUM: CPT | Performed by: NURSE PRACTITIONER

## 2024-12-05 PROCEDURE — 97530 THERAPEUTIC ACTIVITIES: CPT

## 2024-12-05 PROCEDURE — 25000003 PHARM REV CODE 250: Performed by: NURSE PRACTITIONER

## 2024-12-05 PROCEDURE — 84100 ASSAY OF PHOSPHORUS: CPT | Performed by: NURSE PRACTITIONER

## 2024-12-05 PROCEDURE — 84439 ASSAY OF FREE THYROXINE: CPT | Performed by: NURSE PRACTITIONER

## 2024-12-05 RX ADMIN — HEPARIN SODIUM 5000 UNITS: 5000 INJECTION INTRAVENOUS; SUBCUTANEOUS at 10:12

## 2024-12-05 RX ADMIN — HEPARIN SODIUM 5000 UNITS: 5000 INJECTION INTRAVENOUS; SUBCUTANEOUS at 06:12

## 2024-12-05 RX ADMIN — BUDESONIDE INHALATION 0.5 MG: 0.5 SUSPENSION RESPIRATORY (INHALATION) at 07:12

## 2024-12-05 RX ADMIN — ARFORMOTEROL TARTRATE 15 MCG: 15 SOLUTION RESPIRATORY (INHALATION) at 07:12

## 2024-12-05 RX ADMIN — PANTOPRAZOLE SODIUM 40 MG: 40 TABLET, DELAYED RELEASE ORAL at 09:12

## 2024-12-05 RX ADMIN — SODIUM CHLORIDE: 9 INJECTION, SOLUTION INTRAVENOUS at 01:12

## 2024-12-05 RX ADMIN — GABAPENTIN 300 MG: 300 CAPSULE ORAL at 08:12

## 2024-12-05 RX ADMIN — CLOPIDOGREL BISULFATE 75 MG: 75 TABLET ORAL at 09:12

## 2024-12-05 RX ADMIN — SODIUM CHLORIDE: 9 INJECTION, SOLUTION INTRAVENOUS at 03:12

## 2024-12-05 RX ADMIN — HEPARIN SODIUM 5000 UNITS: 5000 INJECTION INTRAVENOUS; SUBCUTANEOUS at 03:12

## 2024-12-05 RX ADMIN — METOPROLOL SUCCINATE 100 MG: 50 TABLET, EXTENDED RELEASE ORAL at 09:12

## 2024-12-05 RX ADMIN — SIMETHICONE 80 MG: 80 TABLET, CHEWABLE ORAL at 08:12

## 2024-12-05 RX ADMIN — MELATONIN TAB 3 MG 6 MG: 3 TAB at 08:12

## 2024-12-05 RX ADMIN — SERTRALINE HYDROCHLORIDE 100 MG: 50 TABLET ORAL at 09:12

## 2024-12-05 NOTE — PT/OT/SLP EVAL
Occupational Therapy   Evaluation and Discharge Note    Name: Jacqueline Luna  MRN: [id number]  Admitting Diagnosis: SHAY (acute kidney injury)  Recent Surgery: * No surgery found *      Recommendations:     Discharge Recommendations: No Therapy Indicated  Discharge Equipment Recommendations: none  Barriers to discharge:  None    Assessment:     Jacqueline Luna is a 69 y.o. female with a medical diagnosis of SHAY (acute kidney injury). At this time, patient is functioning at their prior level of function and does not require further acute OT services.     Plan:   D/C acute OT services.  During this hospitalization, patient does not require further acute OT services.  Please re-consult if situation changes.    Plan of Care Reviewed with: patient    Subjective     Chief Complaint: SOB with exertion   Patient/Family Comments/goals: get better, return home    Occupational Profile:  Living Environment: lives with daughter in a 1 story house with no steps to enter. Walk-in shower with built-in seat. Pt's daughter works during the day.  Previous level of function: Pt (I) with ADLs and functional mobility household and limited community distances. Pt reports some difficulty with ambulation.  Roles and Routines: drives and is retired  Equipment Used at home: grab bar, shower chair  Assistance upon Discharge: daughter    Pain/Comfort:  Pain Rating 1: 0/10    Objective:     Communicated with: Nurse and epic chart review prior to session.  Patient found supine with peripheral IV, telemetry upon OT entry to room.    General Precautions: Standard, fall  Orthopedic Precautions: N/A  Braces: N/A  Respiratory Status: Room air     Occupational Performance:    Bed Mobility:    Patient completed Rolling/Turning to Left with  independence  Patient completed Scooting/Bridging with independence  Patient completed Supine to Sit with independence    Functional Mobility/Transfers:  Patient completed Sit <> Stand Transfer with independence   with  no assistive device   Patient completed Bed <> Chair Transfer using Stand Pivot technique with independence with no assistive device  Functional Mobility: Patient completed x160ft functional mobility with Mod (I) and no AD to increase dynamic standing balance and activity tolerance needed for ADL completion. Pt pushing IV pole throughout.   Pt reporting increased SOB with exertion.    Activities of Daily Living:  Upper Body Dressing: setup A. Solo gown around back  Lower Body Dressing: independence doff/solo socks EOB    Cognitive/Visual Perceptual:  Cognitive/Psychosocial Skills:     -       Oriented to: Person, Place, Time, and Situation   -       Follows Commands/attention:Follows multistep  commands  -       Communication: clear/fluent  -       Memory: No Deficits noted  -       Safety awareness/insight to disability: intact     Physical Exam:  Sensation:    -       Intact  Upper Extremity Range of Motion:     -       Right Upper Extremity: WFL  -       Left Upper Extremity: WFL  Upper Extremity Strength:    -       Right Upper Extremity: WFL  -       Left Upper Extremity: WFL   Strength:    -       Right Upper Extremity: WFL  -       Left Upper Extremity: WFL    AMPAC 6 Click ADL:  AMPAC Total Score: 24    Treatment & Education:  Patient educated on role of OT in acute setting and benefits of participation. Educated on techniques to use to increase independence and decrease fall risk with functional transfers. Educated on importance of OOB activity and calling for A to transfer back to bed. Encouraged completion of B UE AROM therex throughout the day to tolerance to increase functional strength and activity tolerance. Educated patient on importance of increased tolerance to upright position and direct impact on CV endurance and strength. Patient encouraged to sit up in chair for a minimum of 2 consecutive hours per day. Patient stated understanding and in agreement with POC.     Patient left up in  chair with all lines intact, call button in reach, and chair alarm on    GOALS:   Multidisciplinary Problems       Occupational Therapy Goals       Not on file                    History:     Past Medical History:   Diagnosis Date    Anxiety disorder, unspecified     COPD (chronic obstructive pulmonary disease)     Depression     Family history of malignant neoplasm of breast 08/08/2023    Fibrocystic breast     HTN (hypertension)     Hypercholesteremia     Mass of multiple sites of left breast 08/07/2023    Mass of multiple sites of right breast 08/07/2023    Mastodynia 08/08/2023    Mild intermittent asthma, uncomplicated     Pneumonia, unspecified organism     Type 2 diabetes mellitus without complications          Past Surgical History:   Procedure Laterality Date    CLOSED REDUCTION, FINGER, WITH PINNING Right 08/11/2023    Procedure: CLOSED REDUCTION, FINGER, WITH PINNING;  Surgeon: Nawaf Mcclure MD;  Location: [facility] OR;  Service: Orthopedics;  Laterality: Right;  closed reduction and pinning right thumb proximal phalanx fracture    CORONARY ANGIOGRAPHY N/A 03/12/2021    Procedure: ANGIOGRAM, CORONARY ARTERY;  Surgeon: Shruti Harrison MD;  Location: [facility] CATH LAB;  Service: Cardiology;  Laterality: N/A;    CORONARY ANGIOPLASTY WITH STENT PLACEMENT N/A 03/12/2021    Procedure: Angioplasty, Coronary Artery, With Stent Insertion;  Surgeon: Shruti Harrison MD;  Location: [facility] CATH LAB;  Service: Cardiology;  Laterality: N/A;    LEFT HEART CATHETERIZATION Left 03/12/2021    Procedure: CATHETERIZATION, HEART, LEFT;  Surgeon: Shruti Harrison MD;  Location: [facility] CATH LAB;  Service: Cardiology;  Laterality: Left;    REFRACTIVE SURGERY Bilateral 10/2024    Dr Kvng Mercer       Time Tracking:     OT Date of Treatment: 12/05/24  OT Start Time: 1110  OT Stop Time: 1135  OT Total Time (min): 25 min    Billable Minutes:Evaluation 15  Therapeutic Activity 10    12/5/2024  Norma Bentley OT

## 2024-12-05 NOTE — PT/OT/SLP EVAL
"Physical Therapy Evaluation and Discharge Note    Patient Name:  Jacqueline Luna   MRN:  [id number]    Recommendations:     Discharge Recommendations: No Therapy Indicated  Discharge Equipment Recommendations: none   Barriers to discharge: None    Assessment:     Jacqueline Luna is a 69 y.o. female admitted with a medical diagnosis of SHAY (acute kidney injury). .  At this time, patient is functioning at their prior level of function and does not require further acute PT services.     Recent Surgery: * No surgery found *      Plan:     During this hospitalization, patient does not require further acute PT services.  Please re-consult if situation changes.      Subjective     Chief Complaint: Pt is motivated to participate. During ambulation pt reported, "I usually don't walk this far."  Patient/Family Comments/goals: none stated  Pain/Comfort:  Pain Rating 1: 0/10    Patients cultural, spiritual, Mandaeism conflicts given the current situation: no    Living Environment:  Pt reports living with her daughter, 1 story home, no steps to enter. Daughter works during the day.   Prior to admission, patients level of function was INDEP, household ambulation, no AD, driving, retired. Needed frequent rest breaks due to fatigue SOB.  Equipment used at home: grab bar (built in shower chair).  DME owned (not currently used): single point cane.  Upon discharge, patient will have assistance from FAMILY.    Objective:     Communicated with nurse Sewell and [facility] chart review prior to session.  Patient found supine with peripheral IV, telemetry upon PT entry to room.    General Precautions: Standard, fall    Orthopedic Precautions:N/A   Braces: N/A  Respiratory Status: Room air    Exams:  Cognitive Exam:  Patient is oriented to Person, Place, Time, and Situation  Sensation:    -       Intact  Skin Integrity/Edema:      -       Skin integrity: Visible skin intact  RLE ROM: WFL  RLE Strength: WFL  LLE ROM: WFL  LLE Strength: " "WFL    Functional Mobility:  Gait belt applied - Yes  Bed Mobility  Rolling Left: independence  Scooting: independence  Supine to Sit: independence  Transfers  Sit to Stand: modified independence with IV pole  Bed to Chair: modified independence with IV pole using Step Transfer  Gait  Patient ambulated 160ft with  IV pole  and modified independence. Patient demonstrates steady gait, no LOB. No c/o dizziness. SOB with exertion, pt reports SOB at baseline, standing rest as needed.  All lines remained intact throughout ambulation trial.  Balance  Sitting: independence  Standing: modified independence    AM-PAC 6 CLICK MOBILITY  Total Score:21       Treatment and Education:  Pt educated on role of PT in acute care. Educated on importance of OOB activities, activity pacing, and HEP (marching/hip flex, hip abd, heel slides/LAQ, quad sets, ankle pumps) in order to maintain/regain strength. Encouraged to sit up in chair for all meals. Educated on "call don't fall" policy and increased risk of falling due to weakness, instructed to utilize call bell for assistance with all transfers. Pt agreeable to all requests.      AM-PAC 6 CLICK MOBILITY  Total Score:21     Patient left up in chair with all lines intact, call button in reach, chair alarm on, and nurse notified.      History:     Past Medical History:   Diagnosis Date    Anxiety disorder, unspecified     COPD (chronic obstructive pulmonary disease)     Depression     Family history of malignant neoplasm of breast 08/08/2023    Fibrocystic breast     HTN (hypertension)     Hypercholesteremia     Mass of multiple sites of left breast 08/07/2023    Mass of multiple sites of right breast 08/07/2023    Mastodynia 08/08/2023    Mild intermittent asthma, uncomplicated     Pneumonia, unspecified organism     Type 2 diabetes mellitus without complications        Past Surgical History:   Procedure Laterality Date    CLOSED REDUCTION, FINGER, WITH PINNING Right 08/11/2023    " Procedure: CLOSED REDUCTION, FINGER, WITH PINNING;  Surgeon: Nawaf Mcclure MD;  Location: [facility] OR;  Service: Orthopedics;  Laterality: Right;  closed reduction and pinning right thumb proximal phalanx fracture    CORONARY ANGIOGRAPHY N/A 03/12/2021    Procedure: ANGIOGRAM, CORONARY ARTERY;  Surgeon: Shruti Harrison MD;  Location: [facility] CATH LAB;  Service: Cardiology;  Laterality: N/A;    CORONARY ANGIOPLASTY WITH STENT PLACEMENT N/A 03/12/2021    Procedure: Angioplasty, Coronary Artery, With Stent Insertion;  Surgeon: Shruti Harrison MD;  Location: [facility] CATH LAB;  Service: Cardiology;  Laterality: N/A;    LEFT HEART CATHETERIZATION Left 03/12/2021    Procedure: CATHETERIZATION, HEART, LEFT;  Surgeon: Shruti Harrison MD;  Location: [facility] CATH LAB;  Service: Cardiology;  Laterality: Left;    REFRACTIVE SURGERY Bilateral 10/2024    Dr Kvng Mercer       Time Tracking:     PT Received On: 12/05/24  PT Start Time: 1115     PT Stop Time: 1140  PT Total Time (min): 25 min     Billable Minutes: Evaluation 15min and Therapeutic Activity 10min      12/05/2024

## 2024-12-05 NOTE — PLAN OF CARE
OT eval completed.  Pt at [facility] and does not require skilled acute OT services.  D/C acute OT.

## 2024-12-05 NOTE — SUBJECTIVE & OBJECTIVE
Interval History:  Follow up for Campylobacter diarrhea.  Patient remains on gentle hydration as she is still having some bowel movements.  She states it has slowed down since yesterday and she has been able to tolerate a p.o. diet for lunch.  Likely discharge tomorrow barring any acute events.    Review of Systems  Objective:     Vital Signs (Most Recent):  Temp: 98.4 °F (36.9 °C) (12/05/24 1244)  Pulse: (!) 51 (12/05/24 1244)  Resp: 17 (12/05/24 1244)  BP: 119/61 (12/05/24 1244)  SpO2: 96 % (12/05/24 1244) Vital Signs (24h Range):  Temp:  [97.8 °F (36.6 °C)-98.4 °F (36.9 °C)] 98.4 °F (36.9 °C)  Pulse:  [51-68] 51  Resp:  [16-20] 17  SpO2:  [93 %-97 %] 96 %  BP: (110-151)/(56-70) 119/61     Weight: 82.6 kg (182 lb 1.6 oz)  Body mass index is 34.41 kg/m².    Intake/Output Summary (Last 24 hours) at 12/5/2024 1433  Last data filed at 12/4/2024 1500  Gross per 24 hour   Intake 999 ml   Output --   Net 999 ml         Physical Exam  Vitals and nursing note reviewed.   Constitutional:       Appearance: Normal appearance.   HENT:      Head: Normocephalic and atraumatic.      Nose: Nose normal.      Mouth/Throat:      Mouth: Mucous membranes are moist.   Eyes:      Extraocular Movements: Extraocular movements intact.      Conjunctiva/sclera: Conjunctivae normal.   Cardiovascular:      Rate and Rhythm: Normal rate and regular rhythm.      Pulses: Normal pulses.      Heart sounds: Normal heart sounds.   Pulmonary:      Effort: Pulmonary effort is normal.      Breath sounds: Normal breath sounds.   Abdominal:      General: Abdomen is flat. Bowel sounds are normal.      Palpations: Abdomen is soft.   Musculoskeletal:         General: Normal range of motion.      Cervical back: Normal range of motion and neck supple.   Skin:     General: Skin is warm.      Capillary Refill: Capillary refill takes less than 2 seconds.   Neurological:      Mental Status: She is alert and oriented to person, place, and time. Mental status is at  baseline.   Psychiatric:         Mood and Affect: Mood normal.         Behavior: Behavior normal.             Significant Labs: All pertinent labs within the past 24 hours have been reviewed.  Recent Lab Results         12/05/24  0651   12/04/24  2049   12/04/24  1512        Albumin 3.1           ALP 95           ALT 10           Anion Gap 12           Appearance, UA     Cloudy       AST 14           Bacteria, UA     Occasional       Baso # 0.03           Basophil % 0.3           Bilirubin (UA)     Negative       BILIRUBIN TOTAL 0.2  Comment: For infants and newborns, interpretation of results should be based  on gestational age, weight and in agreement with clinical  observations.    Premature Infant recommended reference ranges:  Up to 24 hours.............<8.0 mg/dL  Up to 48 hours............<12.0 mg/dL  3-5 days..................<15.0 mg/dL  6-29 days.................<15.0 mg/dL             BUN 39           Calcium 8.7           Chloride 111           CO2 17           Color, UA     Yellow       Creatinine 1.7           Differential Method Automated           eGFR 32           Eos # 0.2           Eos % 1.7           Free T4 0.86           Glucose 92           Glucose, UA     Negative       Gran # (ANC) 4.6           Gran % 47.3           Hematocrit 38.8           Hemoglobin 11.9           Hyaline Casts, UA     59       Immature Grans (Abs) 0.03  Comment: Mild elevation in immature granulocytes is non specific and   can be seen in a variety of conditions including stress response,   acute inflammation, trauma and pregnancy. Correlation with other   laboratory and clinical findings is essential.             Immature Granulocytes 0.3           Ketones, UA     Negative       Leukocyte Esterase, UA     3+       Lymph # 4.0           Lymph % 40.8           Magnesium  2.4           MCH 24.1           MCHC 30.7           MCV 79           Microscopic Comment     SEE COMMENT  Comment: Other formed elements not mentioned  in the report are not   present in the microscopic examination.          Mono # 0.9           Mono % 9.6           MPV 9.8           NITRITE UA     Negative       nRBC 0           Blood, UA     Trace       pH, UA     5.0       Phosphorus Level 4.0           Platelet Count 264           POCT Glucose   136         Potassium 4.0           PROTEIN TOTAL 6.3           Protein, UA     1+  Comment: Recommend a 24 hour urine protein or a urine   protein/creatinine ratio if globulin induced proteinuria is  clinically suspected.         RBC 4.93           RBC, UA     5       RDW 15.7           Sodium 140           Spec Grav UA     1.015       Specimen UA     Urine, Clean Catch       Squam Epithel, UA     61       TSH 1.040           UROBILINOGEN UA     Negative       WBC, UA     8       WBC 9.67                   Significant Imaging:   X-Ray Abdomen Flat And Erect   Final Result      Nonobstructive bowel gas pattern.         Electronically signed by: Salomón Gomez MD   Date:    12/04/2024   Time:    15:08      US Retroperitoneal Complete   Final Result      Findings consistent with medical renal disease.         Electronically signed by: Salomón Gomez MD   Date:    12/04/2024   Time:    14:36

## 2024-12-05 NOTE — PLAN OF CARE
PT EVAL complete. Pt INDEP with bed mobility, ambulated 160ft MOD I using IV pole. No therapy indicated upon d/c. At this time, patient is functioning at their prior level of function and does not require further acute PT services.  Please re-consult if situation changes.

## 2024-12-05 NOTE — PROGRESS NOTES
[facility]  [facility] Medicine  Progress Note    Patient Name: Jacqueline Luna  MRN: [id number]  Patient Class: IP- Inpatient   Admission Date: 12/4/2024  Length of Stay: 1 days  Attending Physician: Yvette Mireles MD  Primary Care Provider: Claudio Christianson Jr., MD        Subjective     Principal Problem:SHAY (acute kidney injury)        HPI:  The patient is a 68 yo female with CAD s/p stent, COPD, RONALDO, HTN, DM who presented to ED with non bloody diarrhea x 4 days with associated decreased appetite, nausea, generalized weakness, and fatigue. Pt reports several stools per day. She denies abdominal pain, fever, chills. Symptoms unrelieved with imodium     In the ED, Afebrile, low normal BP labs revealed SHAY with serum Cr 4.4. Cdiff +antigen, negative toxin. PCR pending. ?UTI with 3+ leukocytes but only 8 WBC. Abd Xray showed nonobstructive bowel gas pattern. Renal U/S showed medical renal disease.     Overview/Hospital Course:  Patient admitted with diarrhea, found to have Cdiff antigen +, Toxin -, and PCR -, campylobacter +.  Vitals and labs stable.  She had 5 brief changes ysterday, but patient states she was just pouring out stool yesterday.  She had a small BM this morning.  She is tolerating a PO diet.      Interval History:  Follow up for Campylobacter diarrhea.  Patient remains on gentle hydration as she is still having some bowel movements.  She states it has slowed down since yesterday and she has been able to tolerate a p.o. diet for lunch.  Likely discharge tomorrow barring any acute events.    Review of Systems  Objective:     Vital Signs (Most Recent):  Temp: 98.4 °F (36.9 °C) (12/05/24 1244)  Pulse: (!) 51 (12/05/24 1244)  Resp: 17 (12/05/24 1244)  BP: 119/61 (12/05/24 1244)  SpO2: 96 % (12/05/24 1244) Vital Signs (24h Range):  Temp:  [97.8 °F (36.6 °C)-98.4 °F (36.9 °C)] 98.4 °F (36.9 °C)  Pulse:  [51-68] 51  Resp:  [16-20] 17  SpO2:  [93 %-97 %] 96 %  BP: (110-151)/(56-70) 119/61     Weight:  82.6 kg (182 lb 1.6 oz)  Body mass index is 34.41 kg/m².    Intake/Output Summary (Last 24 hours) at 12/5/2024 1433  Last data filed at 12/4/2024 1500  Gross per 24 hour   Intake 999 ml   Output --   Net 999 ml         Physical Exam  Vitals and nursing note reviewed.   Constitutional:       Appearance: Normal appearance.   HENT:      Head: Normocephalic and atraumatic.      Nose: Nose normal.      Mouth/Throat:      Mouth: Mucous membranes are moist.   Eyes:      Extraocular Movements: Extraocular movements intact.      Conjunctiva/sclera: Conjunctivae normal.   Cardiovascular:      Rate and Rhythm: Normal rate and regular rhythm.      Pulses: Normal pulses.      Heart sounds: Normal heart sounds.   Pulmonary:      Effort: Pulmonary effort is normal.      Breath sounds: Normal breath sounds.   Abdominal:      General: Abdomen is flat. Bowel sounds are normal.      Palpations: Abdomen is soft.   Musculoskeletal:         General: Normal range of motion.      Cervical back: Normal range of motion and neck supple.   Skin:     General: Skin is warm.      Capillary Refill: Capillary refill takes less than 2 seconds.   Neurological:      Mental Status: She is alert and oriented to person, place, and time. Mental status is at baseline.   Psychiatric:         Mood and Affect: Mood normal.         Behavior: Behavior normal.             Significant Labs: All pertinent labs within the past 24 hours have been reviewed.  Recent Lab Results         12/05/24  0651   12/04/24  2049   12/04/24  1512        Albumin 3.1           ALP 95           ALT 10           Anion Gap 12           Appearance, UA     Cloudy       AST 14           Bacteria, UA     Occasional       Baso # 0.03           Basophil % 0.3           Bilirubin (UA)     Negative       BILIRUBIN TOTAL 0.2  Comment: For infants and newborns, interpretation of results should be based  on gestational age, weight and in agreement with clinical  observations.    Premature Infant  recommended reference ranges:  Up to 24 hours.............<8.0 mg/dL  Up to 48 hours............<12.0 mg/dL  3-5 days..................<15.0 mg/dL  6-29 days.................<15.0 mg/dL             BUN 39           Calcium 8.7           Chloride 111           CO2 17           Color, UA     Yellow       Creatinine 1.7           Differential Method Automated           eGFR 32           Eos # 0.2           Eos % 1.7           Free T4 0.86           Glucose 92           Glucose, UA     Negative       Gran # (ANC) 4.6           Gran % 47.3           Hematocrit 38.8           Hemoglobin 11.9           Hyaline Casts, UA     59       Immature Grans (Abs) 0.03  Comment: Mild elevation in immature granulocytes is non specific and   can be seen in a variety of conditions including stress response,   acute inflammation, trauma and pregnancy. Correlation with other   laboratory and clinical findings is essential.             Immature Granulocytes 0.3           Ketones, UA     Negative       Leukocyte Esterase, UA     3+       Lymph # 4.0           Lymph % 40.8           Magnesium  2.4           MCH 24.1           MCHC 30.7           MCV 79           Microscopic Comment     SEE COMMENT  Comment: Other formed elements not mentioned in the report are not   present in the microscopic examination.          Mono # 0.9           Mono % 9.6           MPV 9.8           NITRITE UA     Negative       nRBC 0           Blood, UA     Trace       pH, UA     5.0       Phosphorus Level 4.0           Platelet Count 264           POCT Glucose   136         Potassium 4.0           PROTEIN TOTAL 6.3           Protein, UA     1+  Comment: Recommend a 24 hour urine protein or a urine   protein/creatinine ratio if globulin induced proteinuria is  clinically suspected.         RBC 4.93           RBC, UA     5       RDW 15.7           Sodium 140           Spec Grav UA     1.015       Specimen UA     Urine, Clean Catch       Squam Epithel, UA     61        TSH 1.040           UROBILINOGEN UA     Negative       WBC, UA     8       WBC 9.67                   Significant Imaging:   X-Ray Abdomen Flat And Erect   Final Result      Nonobstructive bowel gas pattern.         Electronically signed by: Salomón Gomez MD   Date:    12/04/2024   Time:    15:08      US Retroperitoneal Complete   Final Result      Findings consistent with medical renal disease.         Electronically signed by: Salomón Gomez MD   Date:    12/04/2024   Time:    14:36           Assessment and Plan     * SHAY (acute kidney injury)  SHAY is likely due to pre-renal azotemia due to dehydration. Baseline creatinine is  0.8 . Most recent creatinine and eGFR are listed below.  Recent Labs     12/04/24  1301 12/05/24  0651   CREATININE 4.4* 1.7*   EGFRNORACEVR 10* 32*        Plan  - SHAY is worsening. Will continue current treatment  - Avoid nephrotoxins and renally dose meds for GFR listed above  - Monitor urine output, serial BMP, and adjust therapy as needed  -Hold home meds Lasix, Lisinopril/HCTZ  -IVFs  -improving but not resolved      COPD (chronic obstructive pulmonary disease)  Patient's COPD is controlled currently.  Patient is currently off COPD Pathway. Continue scheduled inhalers  LABA and ICS neb txs  and monitor respiratory status closely.     Diabetes  Patient's FSGs are controlled on current medication regimen.  Last A1c reviewed-   Lab Results   Component Value Date    HGBA1C 6.5 (H) 05/25/2023     Most recent fingerstick glucose reviewed-   Recent Labs   Lab 12/04/24  2049   POCTGLUCOSE 136*     Current correctional scale  Low  Maintain anti-hyperglycemic dose as follows-   Antihyperglycemics (From admission, onward)      Start     Stop Route Frequency Ordered    12/04/24 1850  insulin aspart U-100 pen 0-5 Units         -- SubQ Before meals & nightly PRN 12/04/24 1751          Hold Oral hypoglycemics while patient is in the hospital.    Diarrhea  C diff antigen positive, toxin negative, PCR  negative  Campylobacter positive      CAD (coronary artery disease)  Patient with known CAD s/p stent placement, which is controlled Will continue ASA, Plavix, and Statin and monitor for S/Sx of angina/ACS. Continue to monitor on telemetry.     Ho    Essential hypertension  Patient's blood pressure range in the last 24 hours was: BP  Min: 102/55  Max: 132/62.The patient's inpatient anti-hypertensive regimen is listed below:  Current Antihypertensives       Plan  - Hold antihypertensives for low normal BP         VTE Risk Mitigation (From admission, onward)           Ordered     heparin (porcine) injection 5,000 Units  Every 8 hours         12/04/24 1751     IP VTE HIGH RISK PATIENT  Once         12/04/24 1751     Place sequential compression device  Until discontinued         12/04/24 1751                    Discharge Planning   HOMER:      Code Status: Full Code   Medical Readiness for Discharge Date:                    Please place Justification for DME        Yvette Mireles MD  Department of Hospital Medicine   [facility]

## 2024-12-05 NOTE — ASSESSMENT & PLAN NOTE
SHAY is likely due to pre-renal azotemia due to dehydration. Baseline creatinine is  0.8 . Most recent creatinine and eGFR are listed below.  Recent Labs     12/04/24  1301 12/05/24  0651   CREATININE 4.4* 1.7*   EGFRNORACEVR 10* 32*        Plan  - SHAY is worsening. Will continue current treatment  - Avoid nephrotoxins and renally dose meds for GFR listed above  - Monitor urine output, serial BMP, and adjust therapy as needed  -Hold home meds Lasix, Lisinopril/HCTZ  -IVFs  -improving but not resolved

## 2024-12-05 NOTE — HOSPITAL COURSE
Patient admitted with diarrhea, found to have Cdiff antigen +, Toxin -, and PCR -, campylobacter +. She was treated supportively. Her diarrhea resolved. She was tolerating oral intake. Renal function at baseline on day of discharge. Patient seen and examined, stable for discharge.

## 2024-12-05 NOTE — PLAN OF CARE
[facility]  Initial Discharge Assessment       Primary Care Provider: Claudio Christianson Jr., MD    Admission Diagnosis: Generalized abdominal pain [R10.84]  Clostridium difficile diarrhea [A04.72]  Upper abdominal pain [R10.10]  Clostridium difficile carrier [Z22.1]  Chest pain [R07.9]  Acute renal failure, unspecified acute renal failure type [N17.9]    Admission Date: 12/4/2024  Expected Discharge Date:     Transition of Care Barriers: None    Payor: HU[employer] MANAGED MEDICARE / Plan: HU[employer] MEDICARE HMO / Product Type: Capitation /     Extended Emergency Contact Information  Primary Emergency Contact: Khushbu De La Rosaalyssa  Address: [address] [place] [address] [facility]  Home Phone: [phone]  Mobile Phone: [phone]  Relation: Daughter  Secondary Emergency Contact: ManavBlank  Address: [address] [place], [place] [id number] [place] [address]  Mobile Phone: [phone]  Relation: Daughter    Discharge Plan A: Home with family         [facility] [address] [place]  [address] [place]  [facility] [address]  Phone: [phone] Fax: [phone]    [facility] [address] [facility]  [address] [facility] [address]  Phone: [phone] Fax: [phone]      Initial Assessment (most recent)       Adult Discharge Assessment - 12/05/24 1518          Discharge Assessment    Assessment Type Discharge Planning Assessment     Confirmed/corrected address, phone number and insurance Yes     Confirmed Demographics Correct on Facesheet     Source of Information patient     Communicated HOMER with patient/caregiver Date not available/Unable to determine     Reason For Admission SHAY     People in Home child(pily), adult     Facility Arrived From: home     Do you expect to return to your current living situation? Yes     Do you  have help at home or someone to help you manage your care at home? Yes     Who are your caregiver(s) and their phone number(s)? August williamson     Prior to hospitilization cognitive status: Alert/Oriented     Current cognitive status: Alert/Oriented     Walking or Climbing Stairs Difficulty no     Dressing/Bathing Difficulty no     Equipment Currently Used at Home none     Readmission within 30 days? No     Patient currently being followed by outpatient case management? No     Do you currently have service(s) that help you manage your care at home? No     Do you take prescription medications? Yes     Do you have prescription coverage? Yes     Do you have any problems affording any of your prescribed medications? TBD     Is the patient taking medications as prescribed? yes     How do you get to doctors appointments? car, drives self;family or friend will provide     Are you on dialysis? No     Do you take coumadin? No     Discharge Plan A Home with family     DME Needed Upon Discharge  none     Discharge Plan discussed with: Patient     Transition of Care Barriers None                   Anticipated DC Dispo: home with August murry  Prior Level of Function: independent  Transportation upon discharge: daughter or grandchild will provide transportation home at dc  PCP: Dr. Christianson  Comments:  CM met with patient at bedside to introduce role and discuss d/c planning. Patient is independent in ADLS. No identified CM needs at this time, will continue to follow.

## 2024-12-05 NOTE — ASSESSMENT & PLAN NOTE
Patient's FSGs are controlled on current medication regimen.  Last A1c reviewed-   Lab Results   Component Value Date    HGBA1C 6.5 (H) 05/25/2023     Most recent fingerstick glucose reviewed-   Recent Labs   Lab 12/04/24 2049   POCTGLUCOSE 136*     Current correctional scale  Low  Maintain anti-hyperglycemic dose as follows-   Antihyperglycemics (From admission, onward)    Start     Stop Route Frequency Ordered    12/04/24 1850  insulin aspart U-100 pen 0-5 Units         -- SubQ Before meals & nightly PRN 12/04/24 1751        Hold Oral hypoglycemics while patient is in the hospital.

## 2024-12-06 VITALS
SYSTOLIC BLOOD PRESSURE: 137 MMHG | TEMPERATURE: 98 F | DIASTOLIC BLOOD PRESSURE: 74 MMHG | HEIGHT: 61 IN | WEIGHT: 182.13 LBS | BODY MASS INDEX: 34.39 KG/M2 | HEART RATE: 55 BPM | OXYGEN SATURATION: 97 % | RESPIRATION RATE: 18 BRPM

## 2024-12-06 LAB
ALBUMIN SERPL BCP-MCNC: 3.1 G/DL (ref 3.5–5.2)
ALP SERPL-CCNC: 91 U/L (ref 40–150)
ALT SERPL W/O P-5'-P-CCNC: 9 U/L (ref 10–44)
ANION GAP SERPL CALC-SCNC: 6 MMOL/L (ref 8–16)
AST SERPL-CCNC: 14 U/L (ref 10–40)
BASOPHILS # BLD AUTO: 0.04 K/UL (ref 0–0.2)
BASOPHILS NFR BLD: 0.4 % (ref 0–1.9)
BILIRUB SERPL-MCNC: 0.3 MG/DL (ref 0.1–1)
BUN SERPL-MCNC: 25 MG/DL (ref 8–23)
CALCIUM SERPL-MCNC: 8.8 MG/DL (ref 8.7–10.5)
CHLORIDE SERPL-SCNC: 114 MMOL/L (ref 95–110)
CO2 SERPL-SCNC: 23 MMOL/L (ref 23–29)
CREAT SERPL-MCNC: 0.9 MG/DL (ref 0.5–1.4)
DIFFERENTIAL METHOD BLD: ABNORMAL
E COLI SXT1 STL QL IA: NEGATIVE
E COLI SXT2 STL QL IA: NEGATIVE
EOSINOPHIL # BLD AUTO: 0.2 K/UL (ref 0–0.5)
EOSINOPHIL NFR BLD: 2.1 % (ref 0–8)
ERYTHROCYTE [DISTWIDTH] IN BLOOD BY AUTOMATED COUNT: 15.5 % (ref 11.5–14.5)
EST. GFR  (NO RACE VARIABLE): >60 ML/MIN/1.73 M^2
GIANT PLATELETS BLD QL SMEAR: PRESENT
GLUCOSE SERPL-MCNC: 92 MG/DL (ref 70–110)
HCT VFR BLD AUTO: 36.9 % (ref 37–48.5)
HGB BLD-MCNC: 11.4 G/DL (ref 12–16)
IMM GRANULOCYTES # BLD AUTO: 0.04 K/UL (ref 0–0.04)
IMM GRANULOCYTES NFR BLD AUTO: 0.4 % (ref 0–0.5)
LYMPHOCYTES # BLD AUTO: 3.8 K/UL (ref 1–4.8)
LYMPHOCYTES NFR BLD: 38.6 % (ref 18–48)
MAGNESIUM SERPL-MCNC: 1.9 MG/DL (ref 1.6–2.6)
MCH RBC QN AUTO: 24.4 PG (ref 27–31)
MCHC RBC AUTO-ENTMCNC: 30.9 G/DL (ref 32–36)
MCV RBC AUTO: 79 FL (ref 82–98)
MONOCYTES # BLD AUTO: 0.7 K/UL (ref 0.3–1)
MONOCYTES NFR BLD: 7.5 % (ref 4–15)
NEUTROPHILS # BLD AUTO: 5 K/UL (ref 1.8–7.7)
NEUTROPHILS NFR BLD: 51 % (ref 38–73)
NRBC BLD-RTO: 0 /100 WBC
PHOSPHATE SERPL-MCNC: 2.3 MG/DL (ref 2.7–4.5)
PLATELET # BLD AUTO: 280 K/UL (ref 150–450)
PLATELET BLD QL SMEAR: ABNORMAL
PMV BLD AUTO: 10.3 FL (ref 9.2–12.9)
POCT GLUCOSE: 103 MG/DL (ref 70–110)
POCT GLUCOSE: 97 MG/DL (ref 70–110)
POTASSIUM SERPL-SCNC: 4 MMOL/L (ref 3.5–5.1)
PROT SERPL-MCNC: 6.6 G/DL (ref 6–8.4)
RBC # BLD AUTO: 4.67 M/UL (ref 4–5.4)
SODIUM SERPL-SCNC: 143 MMOL/L (ref 136–145)
WBC # BLD AUTO: 9.79 K/UL (ref 3.9–12.7)

## 2024-12-06 PROCEDURE — 84100 ASSAY OF PHOSPHORUS: CPT | Performed by: NURSE PRACTITIONER

## 2024-12-06 PROCEDURE — 94761 N-INVAS EAR/PLS OXIMETRY MLT: CPT

## 2024-12-06 PROCEDURE — 94640 AIRWAY INHALATION TREATMENT: CPT

## 2024-12-06 PROCEDURE — 85025 COMPLETE CBC W/AUTO DIFF WBC: CPT | Performed by: NURSE PRACTITIONER

## 2024-12-06 PROCEDURE — 63700000 PHARM REV CODE 250 ALT 637 W/O HCPCS: Performed by: INTERNAL MEDICINE

## 2024-12-06 PROCEDURE — 25000242 PHARM REV CODE 250 ALT 637 W/ HCPCS: Performed by: NURSE PRACTITIONER

## 2024-12-06 PROCEDURE — 80053 COMPREHEN METABOLIC PANEL: CPT | Performed by: NURSE PRACTITIONER

## 2024-12-06 PROCEDURE — 63600175 PHARM REV CODE 636 W HCPCS: Performed by: NURSE PRACTITIONER

## 2024-12-06 PROCEDURE — 36415 COLL VENOUS BLD VENIPUNCTURE: CPT | Performed by: NURSE PRACTITIONER

## 2024-12-06 PROCEDURE — 25000003 PHARM REV CODE 250: Performed by: NURSE PRACTITIONER

## 2024-12-06 PROCEDURE — 83735 ASSAY OF MAGNESIUM: CPT | Performed by: NURSE PRACTITIONER

## 2024-12-06 RX ORDER — AZITHROMYCIN 250 MG/1
500 TABLET, FILM COATED ORAL DAILY
Status: DISCONTINUED | OUTPATIENT
Start: 2024-12-06 | End: 2024-12-06 | Stop reason: HOSPADM

## 2024-12-06 RX ADMIN — SERTRALINE HYDROCHLORIDE 100 MG: 50 TABLET ORAL at 09:12

## 2024-12-06 RX ADMIN — ARFORMOTEROL TARTRATE 15 MCG: 15 SOLUTION RESPIRATORY (INHALATION) at 08:12

## 2024-12-06 RX ADMIN — SODIUM CHLORIDE: 9 INJECTION, SOLUTION INTRAVENOUS at 03:12

## 2024-12-06 RX ADMIN — CLOPIDOGREL BISULFATE 75 MG: 75 TABLET ORAL at 09:12

## 2024-12-06 RX ADMIN — PANTOPRAZOLE SODIUM 40 MG: 40 TABLET, DELAYED RELEASE ORAL at 09:12

## 2024-12-06 RX ADMIN — AZITHROMYCIN DIHYDRATE 500 MG: 250 TABLET ORAL at 09:12

## 2024-12-06 RX ADMIN — BUDESONIDE INHALATION 0.5 MG: 0.5 SUSPENSION RESPIRATORY (INHALATION) at 08:12

## 2024-12-06 RX ADMIN — METOPROLOL SUCCINATE 100 MG: 50 TABLET, EXTENDED RELEASE ORAL at 09:12

## 2024-12-06 RX ADMIN — HEPARIN SODIUM 5000 UNITS: 5000 INJECTION INTRAVENOUS; SUBCUTANEOUS at 05:12

## 2024-12-06 NOTE — PLAN OF CARE
Remains injury free. Tolerating diet. Ambulates independently. No s/s acute distress. Stable for discharge.

## 2024-12-06 NOTE — PLAN OF CARE
[facility]  Discharge Final Note    Primary Care Provider: Claudio Christianson Jr., MD    Expected Discharge Date: 12/6/2024    Final Discharge Note (most recent)       Final Note - 12/06/24 1010          Final Note    Assessment Type Final Discharge Note     Anticipated Discharge Disposition Home or Self Care        Post-Acute Status    Discharge Delays None known at this time                     Important Message from Medicare             Contact Info       Claudio Christianson Jr., MD   Specialty: Family Medicine   Relationship: PCP - General    [address] [facility] [address]   Phone: [phone]       Next Steps: Follow up          DC disposition: home with family   [facility]. Patient to schedule hospital follow up.     No other CM needs noted.

## 2024-12-06 NOTE — PLAN OF CARE
Remains injury free. C-diff negative. Ambulates in room . Denies c/o pain. Cardiac monitoring. Blood glucose monitoring. No s/s acute distress.   98.7

## 2024-12-06 NOTE — DISCHARGE SUMMARY
[facility]  [facility] Medicine  Discharge Summary      Patient Name: Jacqueline Luna  MRN: [id number]  PEYMAN: [id number]  Patient Class: IP- Inpatient  Admission Date: 12/4/2024  Hospital Length of Stay: 2 days  Discharge Date and Time:  12/06/2024 10:28 AM  Attending Physician: Tashi Duong MD   Discharging Provider: Tashi Duong MD  Primary Care Provider: Claudio Christianson Jr., MD    Primary Care Team: Networked reference to record PCT     HPI:   The patient is a 68 yo female with CAD s/p stent, COPD, RONALDO, HTN, DM who presented to ED with non bloody diarrhea x 4 days with associated decreased appetite, nausea, generalized weakness, and fatigue. Pt reports several stools per day. She denies abdominal pain, fever, chills. Symptoms unrelieved with imodium     In the ED, Afebrile, low normal BP labs revealed SHAY with serum Cr 4.4. Cdiff +antigen, negative toxin. PCR pending. ?UTI with 3+ leukocytes but only 8 WBC. Abd Xray showed nonobstructive bowel gas pattern. Renal U/S showed medical renal disease.     * No surgery found *      Hospital Course:   Patient admitted with diarrhea, found to have Cdiff antigen +, Toxin -, and PCR -, campylobacter +. She was treated supportively. Her diarrhea resolved. She was tolerating oral intake. Renal function at baseline on day of discharge. Patient seen and examined, stable for discharge.     Goals of Care Treatment Preferences:  Code Status: Full Code      SDOH Screening:  The patient declined to be screened for utility difficulties, food insecurity, transport difficulties, housing insecurity, and interpersonal safety, so no concerns could be identified this admission.     Consults:       Final Active Diagnoses:    Diagnosis Date Noted POA    PRINCIPAL PROBLEM:  SHAY (acute kidney injury) [N17.9] 12/04/2024 Yes    Diarrhea [R19.7] 12/04/2024 Yes    Diabetes [E11.9] 12/04/2024 Yes    COPD (chronic obstructive pulmonary disease) [J44.9] 12/04/2024 Yes    CAD  (coronary artery disease) [I25.10] 09/19/2024 Yes    Essential hypertension [I10] 03/13/2021 Yes      Problems Resolved During this Admission:       Discharged Condition: stable    Disposition: Home or Self Care    Follow Up:   Follow-up Information       Claudio Christianson Jr., MD Follow up.    Specialty: Family Medicine  Contact information:  [address]  [facility] [address]  [phone]                           Patient Instructions:      Diet diabetic       Significant Diagnostic Studies: Radiology:   Imaging Results              X-Ray Abdomen Flat And Erect (Final result)  Result time 12/04/24 15:08:46      Final result by Salomón Gomez MD (12/04/24 15:08:46)                   Impression:      Nonobstructive bowel gas pattern.      Electronically signed by: Salomón Gomez MD  Date:    12/04/2024  Time:    15:08               Narrative:    EXAMINATION:  XR ABDOMEN FLAT AND ERECT    CLINICAL HISTORY:  Generalized abdominal pain    COMPARISON:  none    FINDINGS:  Nonobstructive bowel gas pattern is noted. No radiopaque kidney calculus is identified.  There are multiple calcifications in the pelvis most consistent with phleboliths.  No evidence of organomegaly.  The bones demonstrate mild degenerative changes within the lower lumbar region.  The bones are otherwise intact.  Mild dependent changes at the lung bases.                                       US Retroperitoneal Complete (Final result)  Result time 12/04/24 14:36:35   Procedure changed from US Kidney     Final result by Salomón Gomez MD (12/04/24 14:36:35)                   Impression:      Findings consistent with medical renal disease.      Electronically signed by: Salomón Gomez MD  Date:    12/04/2024  Time:    14:36               Narrative:    EXAMINATION:  US RETROPERITONEAL COMPLETE    CLINICAL HISTORY:  acute renal failure;    TECHNIQUE:  Ultrasound of the kidneys and urinary bladder was performed including color flow  and Doppler evaluation of the kidneys.    COMPARISON:  None.    FINDINGS:  Right kidney: The right kidney measures 9 cm. Mild cortical thinning. No loss of corticomedullary distinction.  Slightly decreased perfusion with elevated RI of 0.83.  No mass. No renal stone. No hydronephrosis.    Left kidney: The left kidney measures 10.9 cm. Mild cortical thinning.  Incidental dromedary hump suspected within the left kidney.  No loss of corticomedullary distinction.  Slightly decreased perfusion with elevated RI of 0.8 . No mass. No renal stone. No hydronephrosis.    The bladder is partially distended at the time of scanning and has an unremarkable appearance.                                        Pending Diagnostic Studies:       Procedure Component Value Units Date/Time    Calprotectin, Stool [[id number]] Collected: 12/04/24 1314    Order Status: Sent Lab Status: In process Updated: 12/04/24 2209    Specimen: Stool     Stool Exam-Ova,Cysts,Parasites [[id number]] Collected: 12/04/24 1314    Order Status: Sent Lab Status: In process Updated: 12/04/24 2147    Specimen: Stool            Medications:  Reconciled Home Medications:      Medication List        CONTINUE taking these medications      albuterol 90 mcg/actuation inhaler  Commonly known as: PROVENTIL/VENTOLIN HFA  Inhale 2 puffs into the lungs every 4 (four) hours as needed for Wheezing or Shortness of Breath. Rescue     amLODIPine 10 MG tablet  Commonly known as: NORVASC  Take 10 mg by mouth once daily.     atorvastatin 80 MG tablet  Commonly known as: LIPITOR  Take 1 tablet (80 mg total) by mouth every evening.     budesonide-glycopyr-formoterol 160-9-4.8 mcg/actuation Hfaa  Inhale 2 puffs into the lungs 2 (two) times daily. Wash out mouth after use.     cholecalciferol (vitamin D3) 1,250 mcg (50,000 unit) capsule  Take 50,000 Units by mouth every 7 days.     clopidogreL 75 mg tablet  Commonly known as: PLAVIX  TAKE 1 TABLET EVERY DAY     ezetimibe 10 mg  tablet  Commonly known as: ZETIA  Take 10 mg by mouth once daily.     furosemide 20 MG tablet  Commonly known as: LASIX  Take 1 tablet (20 mg total) by mouth daily as needed (swelling).     gabapentin 300 MG capsule  Commonly known as: NEURONTIN  Take 300 mg by mouth 3 (three) times daily.     lisinopriL-hydrochlorothiazide 20-12.5 mg per tablet  Commonly known as: PRINZIDE,ZESTORETIC  Take 1 tablet by mouth 2 (two) times a day.     loratadine 10 mg tablet  Commonly known as: CLARITIN  Take 10 mg by mouth daily as needed.     meloxicam 15 MG tablet  Commonly known as: MOBIC  Take 1 tablet (15 mg total) by mouth once daily.     metFORMIN 500 MG ER 24hr tablet  Commonly known as: GLUCOPHAGE-XR  Take 500 mg by mouth once daily.     metoprolol succinate 100 MG 24 hr tablet  Commonly known as: TOPROL-XL  Take 1 tablet (100 mg total) by mouth once daily.     ondansetron 4 MG tablet  Commonly known as: ZOFRAN  Take 1 tablet (4 mg total) by mouth every 6 (six) hours as needed for Nausea.     pantoprazole 40 MG tablet  Commonly known as: PROTONIX  Take 40 mg by mouth once daily.     rifAXIMin 550 mg Tab  Commonly known as: XIFAXAN  Take 550 mg by mouth 3 (three) times daily.     sertraline 100 MG tablet  Commonly known as: ZOLOFT  Take 100 mg by mouth once daily.     TRUE METRIX GLUCOSE TEST STRIP Strp  Generic drug: blood sugar diagnostic  SMARTSIG:Via Meter     TRUEPLUS LANCETS 33 gauge Misc  Generic drug: lancets  1 lancet  by Each Nostril route 2 (two) times a day.              Indwelling Lines/Drains at time of discharge:   Lines/Drains/Airways       None                   Time spent on the discharge of patient: 31 minutes         Tashi Duong MD  Department of Hospital Medicine  [facility]

## 2024-12-07 LAB
BACTERIA STL CULT: ABNORMAL
BACTERIA STL CULT: ABNORMAL

## 2024-12-09 LAB — CALPROTECTIN STL-MCNT: 134 MCG/G

## 2024-12-11 LAB — O+P STL MICRO: NORMAL

## 2025-01-15 ENCOUNTER — OFFICE VISIT (OUTPATIENT)
Dept: SPORTS MEDICINE | Facility: CLINIC | Age: 70
End: 2025-01-15
Payer: MEDICARE

## 2025-01-15 VITALS — WEIGHT: 182.13 LBS | HEIGHT: 61 IN | BODY MASS INDEX: 34.39 KG/M2

## 2025-01-15 DIAGNOSIS — M25.561 CHRONIC PAIN OF RIGHT KNEE: ICD-10-CM

## 2025-01-15 DIAGNOSIS — M17.0 PRIMARY OSTEOARTHRITIS OF BOTH KNEES: ICD-10-CM

## 2025-01-15 DIAGNOSIS — G89.29 CHRONIC PAIN OF RIGHT KNEE: ICD-10-CM

## 2025-01-15 DIAGNOSIS — S83.241A TEAR OF MEDIAL MENISCUS OF RIGHT KNEE, CURRENT, UNSPECIFIED TEAR TYPE, INITIAL ENCOUNTER: Primary | ICD-10-CM

## 2025-01-15 PROCEDURE — 20611 DRAIN/INJ JOINT/BURSA W/US: CPT | Mod: RT,S$GLB,, | Performed by: STUDENT IN AN ORGANIZED HEALTH CARE EDUCATION/TRAINING PROGRAM

## 2025-01-15 PROCEDURE — 3008F BODY MASS INDEX DOCD: CPT | Mod: CPTII,S$GLB,, | Performed by: STUDENT IN AN ORGANIZED HEALTH CARE EDUCATION/TRAINING PROGRAM

## 2025-01-15 PROCEDURE — 99204 OFFICE O/P NEW MOD 45 MIN: CPT | Mod: 25,S$GLB,, | Performed by: STUDENT IN AN ORGANIZED HEALTH CARE EDUCATION/TRAINING PROGRAM

## 2025-01-15 PROCEDURE — 99999 PR PBB SHADOW E&M-EST. PATIENT-LVL III: CPT | Mod: PBBFAC,,, | Performed by: STUDENT IN AN ORGANIZED HEALTH CARE EDUCATION/TRAINING PROGRAM

## 2025-01-15 PROCEDURE — 1159F MED LIST DOCD IN RCRD: CPT | Mod: CPTII,S$GLB,, | Performed by: STUDENT IN AN ORGANIZED HEALTH CARE EDUCATION/TRAINING PROGRAM

## 2025-01-15 PROCEDURE — 1125F AMNT PAIN NOTED PAIN PRSNT: CPT | Mod: CPTII,S$GLB,, | Performed by: STUDENT IN AN ORGANIZED HEALTH CARE EDUCATION/TRAINING PROGRAM

## 2025-01-15 PROCEDURE — 1160F RVW MEDS BY RX/DR IN RCRD: CPT | Mod: CPTII,S$GLB,, | Performed by: STUDENT IN AN ORGANIZED HEALTH CARE EDUCATION/TRAINING PROGRAM

## 2025-01-15 RX ORDER — TRIAMCINOLONE ACETONIDE 40 MG/ML
40 INJECTION, SUSPENSION INTRA-ARTICULAR; INTRAMUSCULAR
Status: DISCONTINUED | OUTPATIENT
Start: 2025-01-15 | End: 2025-01-15 | Stop reason: HOSPADM

## 2025-01-15 RX ADMIN — TRIAMCINOLONE ACETONIDE 40 MG: 40 INJECTION, SUSPENSION INTRA-ARTICULAR; INTRAMUSCULAR at 10:01

## 2025-01-15 NOTE — PROCEDURES
Sports Medicine US - Guidance for Needle Placement    Date/Time: 1/15/2025 10:00 AM    Performed by: Joel Nelson MD  Authorized by: Joel Nelson MD  Preparation: Patient was prepped and draped in the usual sterile fashion.  Local anesthesia used: no    Anesthesia:  Local anesthesia used: no    Sedation:  Patient sedated: no    Patient tolerance: patient tolerated the procedure well with no immediate complications  Comments: Ultrasound guidance was used for needle localization. Images were saved and stored for documentation. The appropriate structures were visualized. Dynamic visualization of the needle was continuous throughout the procedures and maintained good position.

## 2025-01-15 NOTE — PROCEDURES
Large Joint Aspiration/Injection: R knee    Date/Time: 1/15/2025 10:00 AM    Performed by: Joel Nelson MD  Authorized by: Joel Nelson MD    Consent Done?:  Yes (Verbal)  Indications:  Arthritis and pain  Site marked: the procedure site was marked    Timeout: prior to procedure the correct patient, procedure, and site was verified    Prep: patient was prepped and draped in usual sterile fashion    Local anesthetic:  Bupivacaine 0.5% without epinephrine and lidocaine 1% without epinephrine    Details:  Needle Size:  21 G  Ultrasonic Guidance for needle placement?: Yes    Images are saved and documented.  Approach:  Lateral (superior)  Location:  Knee  Site:  R knee  Medications:  40 mg triamcinolone acetonide 40 mg/mL  Patient tolerance:  Patient tolerated the procedure well with no immediate complications     Ultrasound guidance was used for needle localization. Images were saved and stored for documentation. The appropriate structures were visualized. Dynamic visualization of the needle was continuous throughout the procedures and maintained good position.

## 2025-01-15 NOTE — PATIENT INSTRUCTIONS
Assessment:  Jacqueline Luna is a 69 y.o. female   Chief Complaint   Patient presents with    Right Knee - Pain       No diagnosis found.     Plan:  Ultrasound guided cortisone injection to right knee  We discussed the proper protocols after the injection such as no submerging pools, baths tubs, or hot tubs for 24 hr.  Showering is okay today.  We also discussed that blood sugars can be elevated after an injection and asked patient to properly checked her sugars over the next few days and contact their PCP if there are any concerns.  We discussed red flags such as fevers, chills, red, warm, tender joint at the area of injection to please seek medical care immediately.    Apply topical diclofenac (Voltaren) up to 4 times a day to the affected area.  It can be bought over the counter at any local pharmacy.    Patient may ice every 2 hours for 15 minutes as needed to control pain and swelling.   Follow up as needed        General Arthritis info:    -shiny white stuff at end of a chicken bones is cartilage    -arthritis is wearing away of the cartilage that lines the end of your bones    -osteoarthritis is thought to be a wear and tear phenomenon    -symptoms are due to inflammation of joint causing stiffness, aching, and sometimes swelling    -occasionally sharp pain will occur causing a give way sensation    -Risk factors: genetic, weight, female > male, age    Treatment options:    -maintain healthy weight (every pound is 4 pounds of pressure on the knee)    -daily moderate exercise (walk, bike, swim 30 minutes per day) to keep joints moving    -daily strengthening exercises (through therapy or on own) to keep muscles supporting joint healthy and strong    -glucosamine 1500mg daily (look for USP label on bottle)    -tylenol as needed for pain (follow directions on the bottle)    -anti-inflammatory medication such as alleve may be helpful- take 1-2 tabs twice daily for 7 days. If it helps your pain, continue. If you  do not feel any change, you may stop and then take it as needed.    (you may be given a once daily anti-inflammatory such as MOBIC. If given, avoid other anti-inflammatory medications such as advil, ibuprofen, motrin, naprosyn, alleve, etc)    -if swollen and painful, ice, decrease activity, and take anti-inflammatory daily for 5-7 days and if no relief call your doctor for further options    -consider cortisone injection (every 3-4 months at most)- anti- inflammatory steroid medication that can be injected directly into the joint to reduce inflammation    -consider hyaluronic acid injections (eufflexxa, hyalgan, synvisc, supartz) (every 6 months at most)- protein injection that helps decrease pain and irritability in the joint. It is best used to help prolong intervals between cortisone injections to minimize steroid injections. These are currently approved for knee injections. Discuss with your doctor if other joint involved. Call to seek approval prior to the injections.    -long-term treatment may include a total joint replacement (keep diary of good days and bad days, then evaluate as to when you are ready)      Follow-up: as needed.    Thank you for choosing [facility] and Dr. Joel Nelson for your orthopedic & sports medicine care. It is our goal to provide you with exceptional care that will help keep you healthy, active, and get you back in the game.    Please do not hesitate to reach out to us via email, phone, or MyChart with any questions, concerns, or feedback.    If you felt that you received exemplary care today, please consider leaving us feedback on [facility] at:  [email or web address]    If you are experiencing pain/discomfort ,or have questions after 5pm and would like to be connected to the [facility] on-call team, please call this number and specify which Sports Medicine provider is treating  you: [phone]

## 2025-01-15 NOTE — PROGRESS NOTES
Patient ID: Jacqueline Luna  YOB: 1955  MRN: [id number]    Chief Complaint: Pain of the Right Knee      Referred By: KARSON Angel    History of Present Illness: Jacqueline Luna is a 69 y.o. female who presents today with right knee pain.  A few months ago, he patient noticed a limp while walking, which became increasingly painful. The pain has now become severe enough to interfere with daily activities. She describes the pain as primarily located in the medial joint line of her right knee.  She was seen by KARSON Meyer, patient states that since she has been taking meloxicam and wearing a hinge knee brace that her knee pain is much better. She is here to be evaluated for knee pain and swelling.           Past Medical History:   Past Medical History:   Diagnosis Date    Anxiety disorder, unspecified     COPD (chronic obstructive pulmonary disease)     Depression     Family history of malignant neoplasm of breast 08/08/2023    Fibrocystic breast     HTN (hypertension)     Hypercholesteremia     Mass of multiple sites of left breast 08/07/2023    Mass of multiple sites of right breast 08/07/2023    Mastodynia 08/08/2023    Mild intermittent asthma, uncomplicated     Pneumonia, unspecified organism     Type 2 diabetes mellitus without complications      Past Surgical History:   Procedure Laterality Date    CLOSED REDUCTION, FINGER, WITH PINNING Right 08/11/2023    Procedure: CLOSED REDUCTION, FINGER, WITH PINNING;  Surgeon: Nawaf Mcclure MD;  Location: [facility] OR;  Service: Orthopedics;  Laterality: Right;  closed reduction and pinning right thumb proximal phalanx fracture    CORONARY ANGIOGRAPHY N/A 03/12/2021    Procedure: ANGIOGRAM, CORONARY ARTERY;  Surgeon: Shruti Harrison MD;  Location: [facility] CATH LAB;  Service: Cardiology;  Laterality: N/A;    CORONARY ANGIOPLASTY WITH STENT PLACEMENT N/A 03/12/2021    Procedure: Angioplasty, Coronary Artery, With Stent Insertion;  Surgeon: Shruti  MD Hunter;  Location: [facility] CATH LAB;  Service: Cardiology;  Laterality: N/A;    LEFT HEART CATHETERIZATION Left 03/12/2021    Procedure: CATHETERIZATION, HEART, LEFT;  Surgeon: Shruti Harrison MD;  Location: [facility] CATH LAB;  Service: Cardiology;  Laterality: Left;    REFRACTIVE SURGERY Bilateral 10/2024    Dr Kvng Mercer     Family History   Problem Relation Name Age of Onset    Diabetes Mother      Hypertension Mother      Cancer Mother      Cancer Father      COPD Father      Breast cancer Sister          dx 40's    Breast cancer Sister  74    Breast cancer Maternal Aunt      Breast cancer Maternal Aunt      Ovarian cancer Neg Hx       Social History     Socioeconomic History    Marital status: Single   Tobacco Use    Smoking status: Former     Current packs/day: 0.00     Average packs/day: 0.5 packs/day for 35.2 years (17.6 ttl pk-yrs)     Types: Cigarettes     Start date: 1/1/1986     Quit date: 3/5/2021     Years since quitting: 3.8    Smokeless tobacco: Never   Substance and Sexual Activity    Alcohol use: Yes     Comment: occassional    Drug use: Never    Sexual activity: Not Currently     Birth control/protection: Abstinence     Social Drivers of Health     Financial Resource Strain: Low Risk  (1/8/2025)    Overall Financial Resource Strain (CARDIA)     Difficulty of Paying Living Expenses: Not hard at all   Food Insecurity: No Food Insecurity (1/8/2025)    Hunger Vital Sign     Worried About Running Out of Food in the Last Year: Never true     Ran Out of Food in the Last Year: Never true   Transportation Needs: Patient Declined (12/5/2024)    TRANSPORTATION NEEDS     Transportation : Patient declined   Physical Activity: Unknown (1/8/2025)    Exercise Vital Sign     Days of Exercise per Week: 1 day   Stress: No Stress Concern Present (1/8/2025)    [employer] - Occupational Stress Questionnaire     Feeling of Stress : Only a little   Housing Stability: Unknown (1/8/2025)     Housing Stability Vital Sign     Unable to Pay for Housing in the Last Year: No     Homeless in the Last Year: Patient declined     Medication List with Changes/Refills   Current Medications    ALBUTEROL (PROVENTIL/VENTOLIN HFA) 90 MCG/ACTUATION INHALER    Inhale 2 puffs into the lungs every 4 (four) hours as needed for Wheezing or Shortness of Breath. Rescue    AMLODIPINE (NORVASC) 10 MG TABLET    Take 10 mg by mouth once daily.    ATORVASTATIN (LIPITOR) 80 MG TABLET    Take 1 tablet (80 mg total) by mouth every evening.    BUDESONIDE-GLYCOPYR-FORMOTEROL 160-9-4.8 MCG/ACTUATION HFAA    Inhale 2 puffs into the lungs 2 (two) times daily. Wash out mouth after use.    CHOLECALCIFEROL, VITAMIN D3, 1,250 MCG (50,000 UNIT) CAPSULE    Take 50,000 Units by mouth every 7 days.    CLOPIDOGREL (PLAVIX) 75 MG TABLET    TAKE 1 TABLET EVERY DAY    EZETIMIBE (ZETIA) 10 MG TABLET    Take 10 mg by mouth once daily.    FUROSEMIDE (LASIX) 20 MG TABLET    Take 1 tablet (20 mg total) by mouth daily as needed (swelling).    GABAPENTIN (NEURONTIN) 300 MG CAPSULE    Take 300 mg by mouth 3 (three) times daily.    LISINOPRIL-HYDROCHLOROTHIAZIDE (PRINZIDE,ZESTORETIC) 20-12.5 MG PER TABLET    Take 1 tablet by mouth 2 (two) times a day.    LORATADINE (CLARITIN) 10 MG TABLET    Take 10 mg by mouth daily as needed.    MELOXICAM (MOBIC) 15 MG TABLET    Take 1 tablet (15 mg total) by mouth once daily.    METFORMIN (GLUCOPHAGE-XR) 500 MG ER 24HR TABLET    Take 500 mg by mouth once daily.    METOPROLOL SUCCINATE (TOPROL-XL) 100 MG 24 HR TABLET    Take 1 tablet (100 mg total) by mouth once daily.    ONDANSETRON (ZOFRAN) 4 MG TABLET    Take 1 tablet (4 mg total) by mouth every 6 (six) hours as needed for Nausea.    PANTOPRAZOLE (PROTONIX) 40 MG TABLET    Take 40 mg by mouth once daily.    RIFAXIMIN (XIFAXAN) 550 MG TAB    Take 550 mg by mouth 3 (three) times daily.    SERTRALINE (ZOLOFT) 100 MG TABLET    Take 100 mg by mouth once daily.    TRUE METRIX  GLUCOSE TEST STRIP STRP    SMARTSIG:Via Meter    TRUEPLUS LANCETS 33 GAUGE MISC    1 lancet  by Each Nostril route 2 (two) times a day.     Review of patient's allergies indicates:  No Known Allergies    Physical Exam:   Body mass index is 34.41 kg/m².    GENERAL: Well appearing, in no acute distress.  HEAD: Normocephalic and atraumatic.  ENT: External ears and nose grossly normal.  EYES: EOMI bilaterally  PULMONARY: Respirations are grossly even and non-labored.  NEURO: Awake, alert, and oriented x 3.  SKIN: No obvious rashes appreciated.  PSYCH: Mood & affect are appropriate.    Detailed MSK exam:     Right knee exam:   -ROM: extension 0, flexion 130  -TTP: Medial joint line and Popliteal fossa  -effusion: none  -Patellar apprehension negative  -Katy test positive -  medial  -stable to varus and valgus stress tests  -Lachman test negative, anterior drawer test negative, posterior drawer test negative        Imaging:  X-Ray Abdomen Flat And Erect  Narrative: EXAMINATION:  XR ABDOMEN FLAT AND ERECT    CLINICAL HISTORY:  Generalized abdominal pain    COMPARISON:  none    FINDINGS:  Nonobstructive bowel gas pattern is noted. No radiopaque kidney calculus is identified.  There are multiple calcifications in the pelvis most consistent with phleboliths.  No evidence of organomegaly.  The bones demonstrate mild degenerative changes within the lower lumbar region.  The bones are otherwise intact.  Mild dependent changes at the lung bases.  Impression: Nonobstructive bowel gas pattern.    Electronically signed by: Salomón Gomez MD  Date:    12/04/2024  Time:    15:08  US Retroperitoneal Complete  Narrative: EXAMINATION:  US RETROPERITONEAL COMPLETE    CLINICAL HISTORY:  acute renal failure;    TECHNIQUE:  Ultrasound of the kidneys and urinary bladder was performed including color flow and Doppler evaluation of the kidneys.    COMPARISON:  None.    FINDINGS:  Right kidney: The right kidney measures 9 cm. Mild cortical  thinning. No loss of corticomedullary distinction.  Slightly decreased perfusion with elevated RI of 0.83.  No mass. No renal stone. No hydronephrosis.    Left kidney: The left kidney measures 10.9 cm. Mild cortical thinning.  Incidental dromedary hump suspected within the left kidney.  No loss of corticomedullary distinction.  Slightly decreased perfusion with elevated RI of 0.8 . No mass. No renal stone. No hydronephrosis.    The bladder is partially distended at the time of scanning and has an unremarkable appearance.  Impression: Findings consistent with medical renal disease.    Electronically signed by: Salomón Gomez MD  Date:    12/04/2024  Time:    14:36        Relevant imaging results were reviewed and interpreted by me and per my read shows mild medial compartment joint space narrowing.  This was discussed with the patient and / or family today.     Assessment:  Jacqueline Luna is a 69 y.o. female presenting with right knee pain.   History, physical and radiographs are consistent with a likely diagnosis of mild early OA, possible MMT.   Plan: Steroid injection given today (see separate procedure note for details). We discussed the proper protocols after the injection such as no submerging pools, baths tubs, or hot tubs for 24 hr.  Showering is okay today.  We also discussed that blood sugars can be elevated after an injection and asked patient to properly checked her sugars over the next few days and contact their PCP if there are any concerns.  We discussed red flags such as fevers, chills, red, warm, tender joint at the area of injection to please seek medical care immediately.   Consider gel injection if not improving. Continue conservative management for pain.   Follow up as needed. All questions answered.      Tear of medial meniscus of right knee, current, unspecified tear type, initial encounter  -     Large Joint Aspiration/Injection: R knee    Chronic pain of right knee  -     Sports Medicine US  - Guidance for Needle Placement    Primary osteoarthritis of both knees  -     Large Joint Aspiration/Injection: R knee         Ultrasound guidance was used for needle localization. Images were saved and stored for documentation. The appropriate structures were visualized. Dynamic visualization of the needle was continuous throughout the procedures and maintained good position.      MEDICAL NECESSITY FOR VISCOSUPPLEMENTATION: After thorough evaluation of the patient, I have determined that visco-supplementation is medically necessary. The patient has painful degenerative changes of the knee with failure of conservative treatments including lifestyle modifications and rehabilitation exercises.  Oral analgesis/NSAIDs have not adequately controlled symptoms and there is radiographic evidence of Kellgren Aneudy grade 2 or greater osteoarthritic changes, or in lack of radiographic evidence, there is arthroscopic or other evidence of chondrosis.       A copy of today's visit note has been sent to the referring provider.     Electronically signed:  Joel Nelson MD, MPH  01/15/2025  10:15 AM

## 2025-02-07 ENCOUNTER — HOSPITAL ENCOUNTER (EMERGENCY)
Facility: HOSPITAL | Age: 70
Discharge: HOME | End: 2025-02-07
Attending: FAMILY MEDICINE
Payer: MEDICARE

## 2025-02-07 VITALS
WEIGHT: 183.63 LBS | HEART RATE: 55 BPM | OXYGEN SATURATION: 96 % | SYSTOLIC BLOOD PRESSURE: 118 MMHG | DIASTOLIC BLOOD PRESSURE: 57 MMHG | TEMPERATURE: 98 F | RESPIRATION RATE: 18 BRPM | BODY MASS INDEX: 34.7 KG/M2

## 2025-02-07 DIAGNOSIS — S39.012A STRAIN OF LUMBAR REGION, INITIAL ENCOUNTER: Primary | ICD-10-CM

## 2025-02-07 LAB
ALBUMIN SERPL BCP-MCNC: 3.7 G/DL (ref 3.5–5.2)
ALP SERPL-CCNC: 123 U/L (ref 40–150)
ALT SERPL W/O P-5'-P-CCNC: 12 U/L (ref 10–44)
ANION GAP SERPL CALC-SCNC: 11 MMOL/L (ref 8–16)
AST SERPL-CCNC: 13 U/L (ref 10–40)
BASOPHILS # BLD AUTO: 0.07 K/UL (ref 0–0.2)
BASOPHILS NFR BLD: 0.4 % (ref 0–1.9)
BILIRUB SERPL-MCNC: 0.3 MG/DL (ref 0.1–1)
BILIRUB UR QL STRIP: NEGATIVE
BUN SERPL-MCNC: 22 MG/DL (ref 8–23)
CALCIUM SERPL-MCNC: 10.1 MG/DL (ref 8.7–10.5)
CHLORIDE SERPL-SCNC: 107 MMOL/L (ref 95–110)
CLARITY UR: CLEAR
CO2 SERPL-SCNC: 23 MMOL/L (ref 23–29)
COLOR UR: COLORLESS
CREAT SERPL-MCNC: 1.2 MG/DL (ref 0.5–1.4)
DIFFERENTIAL METHOD BLD: ABNORMAL
EOSINOPHIL # BLD AUTO: 0.3 K/UL (ref 0–0.5)
EOSINOPHIL NFR BLD: 2 % (ref 0–8)
ERYTHROCYTE [DISTWIDTH] IN BLOOD BY AUTOMATED COUNT: 16.1 % (ref 11.5–14.5)
EST. GFR  (NO RACE VARIABLE): 49 ML/MIN/1.73 M^2
GLUCOSE SERPL-MCNC: 87 MG/DL (ref 70–110)
GLUCOSE UR QL STRIP: NEGATIVE
HCT VFR BLD AUTO: 39.9 % (ref 37–48.5)
HGB BLD-MCNC: 12.4 G/DL (ref 12–16)
HGB UR QL STRIP: NEGATIVE
HYALINE CASTS #/AREA URNS LPF: 55 /LPF
IMM GRANULOCYTES # BLD AUTO: 0.07 K/UL (ref 0–0.04)
IMM GRANULOCYTES NFR BLD AUTO: 0.4 % (ref 0–0.5)
KETONES UR QL STRIP: NEGATIVE
LEUKOCYTE ESTERASE UR QL STRIP: ABNORMAL
LYMPHOCYTES # BLD AUTO: 5.1 K/UL (ref 1–4.8)
LYMPHOCYTES NFR BLD: 31.8 % (ref 18–48)
MCH RBC QN AUTO: 25.3 PG (ref 27–31)
MCHC RBC AUTO-ENTMCNC: 31.1 G/DL (ref 32–36)
MCV RBC AUTO: 81 FL (ref 82–98)
MICROSCOPIC COMMENT: ABNORMAL
MONOCYTES # BLD AUTO: 0.8 K/UL (ref 0.3–1)
MONOCYTES NFR BLD: 4.8 % (ref 4–15)
NEUTROPHILS # BLD AUTO: 9.7 K/UL (ref 1.8–7.7)
NEUTROPHILS NFR BLD: 60.6 % (ref 38–73)
NITRITE UR QL STRIP: NEGATIVE
NRBC BLD-RTO: 0 /100 WBC
PH UR STRIP: 5 [PH] (ref 5–8)
PLATELET # BLD AUTO: 298 K/UL (ref 150–450)
PMV BLD AUTO: 9.5 FL (ref 9.2–12.9)
POTASSIUM SERPL-SCNC: 4.4 MMOL/L (ref 3.5–5.1)
PROT SERPL-MCNC: 7.5 G/DL (ref 6–8.4)
PROT UR QL STRIP: NEGATIVE
RBC # BLD AUTO: 4.91 M/UL (ref 4–5.4)
SODIUM SERPL-SCNC: 141 MMOL/L (ref 136–145)
SP GR UR STRIP: 1.01 (ref 1–1.03)
URN SPEC COLLECT METH UR: ABNORMAL
UROBILINOGEN UR STRIP-ACNC: NEGATIVE EU/DL
WBC # BLD AUTO: 16.1 K/UL (ref 3.9–12.7)
WBC #/AREA URNS HPF: 1 /HPF (ref 0–5)

## 2025-02-07 PROCEDURE — 96374 THER/PROPH/DIAG INJ IV PUSH: CPT

## 2025-02-07 PROCEDURE — 85025 COMPLETE CBC W/AUTO DIFF WBC: CPT | Performed by: FAMILY MEDICINE

## 2025-02-07 PROCEDURE — 81000 URINALYSIS NONAUTO W/SCOPE: CPT | Performed by: FAMILY MEDICINE

## 2025-02-07 PROCEDURE — 99284 EMERGENCY DEPT VISIT MOD MDM: CPT | Mod: 25

## 2025-02-07 PROCEDURE — 63600175 PHARM REV CODE 636 W HCPCS: Mod: JZ,TB | Performed by: FAMILY MEDICINE

## 2025-02-07 PROCEDURE — 80053 COMPREHEN METABOLIC PANEL: CPT | Performed by: FAMILY MEDICINE

## 2025-02-07 RX ORDER — TRAMADOL HYDROCHLORIDE 50 MG/1
50 TABLET ORAL EVERY 6 HOURS PRN
Qty: 12 TABLET | Refills: 0 | Status: SHIPPED | OUTPATIENT
Start: 2025-02-07

## 2025-02-07 RX ORDER — KETOROLAC TROMETHAMINE 30 MG/ML
15 INJECTION, SOLUTION INTRAMUSCULAR; INTRAVENOUS
Status: COMPLETED | OUTPATIENT
Start: 2025-02-07 | End: 2025-02-07

## 2025-02-07 RX ADMIN — KETOROLAC TROMETHAMINE 15 MG: 30 INJECTION, SOLUTION INTRAMUSCULAR at 05:02

## 2025-02-07 NOTE — ED PROVIDER NOTES
SCRIBE #1 NOTE: I, Chele Mendoza and Nehemias Serrano, am scribing for, and in the presence of, Valery Parker MD. I have scribed the entire note.       History     Chief Complaint   Patient presents with    Back Pain     Pt. C/o lower back pain that started 3 days ago. Pt denies any injuries or trauma to cause the pain. Pt. Denies any dysuria, polyuria, or any urinary complaints      Review of patient's allergies indicates:  No Known Allergies      History of Present Illness     HPI    2/7/2025, 5:43 PM  History obtained from the patient      History of Present Illness: Jacqueline Luna is a 69 y.o. female patient with a PMHx of COPD, HTN, depression, DM, and pneumonia who presents to the Emergency Department for evaluation of back pain which onset gradually since Wednesday evening. Pt reports generalized abdominal pain and left lower back pain that radiates to her buttocks. Pt reports that pain exacerbates with movement and she has not seen a specialist for pain. Pain worsens with standing to pelvic area. Symptoms are constant and moderate in severity. Patient denies any nausea, vomiting, fever, and all other sxs at this time. No Prior Tx. No further complaints or concerns at this time.       Arrival mode: Personal transporation    PCP: Claudio Christianson Jr., MD        Past Medical History:  Past Medical History:   Diagnosis Date    Anxiety disorder, unspecified     COPD (chronic obstructive pulmonary disease)     Depression     Family history of malignant neoplasm of breast 08/08/2023    Fibrocystic breast     HTN (hypertension)     Hypercholesteremia     Mass of multiple sites of left breast 08/07/2023    Mass of multiple sites of right breast 08/07/2023    Mastodynia 08/08/2023    Mild intermittent asthma, uncomplicated     Pneumonia, unspecified organism     Type 2 diabetes mellitus without complications        Past Surgical History:  Past Surgical History:   Procedure Laterality Date    CLOSED REDUCTION,  FINGER, WITH PINNING Right 08/11/2023    Procedure: CLOSED REDUCTION, FINGER, WITH PINNING;  Surgeon: Nawaf Mcclure MD;  Location: [facility] OR;  Service: Orthopedics;  Laterality: Right;  closed reduction and pinning right thumb proximal phalanx fracture    CORONARY ANGIOGRAPHY N/A 03/12/2021    Procedure: ANGIOGRAM, CORONARY ARTERY;  Surgeon: Shruti Harrison MD;  Location: [facility] CATH LAB;  Service: Cardiology;  Laterality: N/A;    CORONARY ANGIOPLASTY WITH STENT PLACEMENT N/A 03/12/2021    Procedure: Angioplasty, Coronary Artery, With Stent Insertion;  Surgeon: Shruti Harrison MD;  Location: [facility] CATH LAB;  Service: Cardiology;  Laterality: N/A;    LEFT HEART CATHETERIZATION Left 03/12/2021    Procedure: CATHETERIZATION, HEART, LEFT;  Surgeon: Shruti Harrison MD;  Location: [facility] CATH LAB;  Service: Cardiology;  Laterality: Left;    REFRACTIVE SURGERY Bilateral 10/2024    Dr Kvng Mercer         Family History:  Family History   Problem Relation Name Age of Onset    Diabetes Mother      Hypertension Mother      Cancer Mother      Cancer Father      COPD Father      Breast cancer Sister          dx 40's    Breast cancer Sister  74    Breast cancer Maternal Aunt      Breast cancer Maternal Aunt      Ovarian cancer Neg Hx         Social History:  Social History     Tobacco Use    Smoking status: Former     Current packs/day: 0.00     Average packs/day: 0.5 packs/day for 35.2 years (17.6 ttl pk-yrs)     Types: Cigarettes     Start date: 1/1/1986     Quit date: 3/5/2021     Years since quitting: 3.9    Smokeless tobacco: Never   Substance and Sexual Activity    Alcohol use: Yes     Comment: occassional    Drug use: Never    Sexual activity: Not Currently     Birth control/protection: Abstinence        Review of Systems     Review of Systems   Constitutional:  Negative for chills and fever.   HENT:  Negative for congestion and sore throat.    Respiratory:  Negative for cough and shortness of breath.     Cardiovascular:  Negative for chest pain.   Gastrointestinal:  Positive for abdominal pain (generalized). Negative for diarrhea, nausea and vomiting.   Genitourinary:  Negative for dysuria.   Musculoskeletal:  Positive for back pain (left lower, radiates to buttocks and pelvic region).   Skin:  Negative for rash.   Neurological:  Negative for dizziness, weakness, light-headedness, numbness and headaches.   Hematological:  Does not bruise/bleed easily.   All other systems reviewed and are negative.     Physical Exam     Initial Vitals [02/07/25 1623]   BP Pulse Resp Temp SpO2   (!) 118/57 (!) 55 18 98.1 °F (36.7 °C) 96 %      MAP       --          Physical Exam  Nursing Notes and Vital Signs Reviewed.  Constitutional: Patient is in no acute distress. Well-developed and well-nourished.  Head: Atraumatic. Normocephalic.  Eyes: PERRL. EOM intact. Conjunctivae are not pale. No scleral icterus.  ENT: Mucous membranes are moist. Oropharynx is clear and symmetric.    Neck: Supple. Full ROM. No lymphadenopathy.  Cardiovascular: Regular rate. Regular rhythm. No murmurs, rubs, or gallops. Distal pulses are 2+ and symmetric.  Pulmonary/Chest: No respiratory distress. Clear to auscultation bilaterally. No wheezing or rales.  Abdominal: Soft and non-distended.  There is no tenderness.  No rebound, guarding, or rigidity. Good bowel sounds.  Genitourinary: No CVA tenderness  Musculoskeletal: Moves all extremities. No obvious deformities. No edema. No calf tenderness. Left lower Paraspinous muscle tenderness.  Skin: Warm and dry.  Neurological:  Alert, awake, and appropriate.  Normal speech.  No acute focal neurological deficits are appreciated.  Psychiatric: Normal affect. Good eye contact. Appropriate in content.     ED Course   Procedures  ED Vital Signs:  Vitals:    02/07/25 1623   BP: (!) 118/57   Pulse: (!) 55   Resp: 18   Temp: 98.1 °F (36.7 °C)   TempSrc: Oral   SpO2: 96%   Weight: 83.3 kg (183 lb 10.3 oz)       Abnormal  Lab Results:  Labs Reviewed   CBC W/ AUTO DIFFERENTIAL - Abnormal       Result Value    WBC 16.10 (*)     RBC 4.91      Hemoglobin 12.4      Hematocrit 39.9      MCV 81 (*)     MCH 25.3 (*)     MCHC 31.1 (*)     RDW 16.1 (*)     Platelets 298      MPV 9.5      Immature Granulocytes 0.4      Gran # (ANC) 9.7 (*)     Immature Grans (Abs) 0.07 (*)     Lymph # 5.1 (*)     Mono # 0.8      Eos # 0.3      Baso # 0.07      nRBC 0      Gran % 60.6      Lymph % 31.8      Mono % 4.8      Eosinophil % 2.0      Basophil % 0.4      Differential Method Automated     COMPREHENSIVE METABOLIC PANEL - Abnormal    Sodium 141      Potassium 4.4      Chloride 107      CO2 23      Glucose 87      BUN 22      Creatinine 1.2      Calcium 10.1      Total Protein 7.5      Albumin 3.7      Total Bilirubin 0.3      Alkaline Phosphatase 123      AST 13      ALT 12      eGFR 49 (*)     Anion Gap 11     URINALYSIS - Abnormal    Specimen UA Urine, Catheterized      Color, UA Colorless (*)     Appearance, UA Clear      pH, UA 5.0      Specific Gravity, UA 1.010      Protein, UA Negative      Glucose, UA Negative      Ketones, UA Negative      Bilirubin (UA) Negative      Occult Blood UA Negative      Nitrite, UA Negative      Urobilinogen, UA Negative      Leukocytes, UA 1+ (*)    URINALYSIS MICROSCOPIC - Abnormal    WBC, UA 1      Hyaline Casts, UA 55 (*)     Microscopic Comment SEE COMMENT          All Lab Results:  Results for orders placed or performed during the hospital encounter of 02/07/25   CBC auto differential    Collection Time: 02/07/25  5:04 PM   Result Value Ref Range    WBC 16.10 (H) 3.90 - 12.70 K/uL    RBC 4.91 4.00 - 5.40 M/uL    Hemoglobin 12.4 12.0 - 16.0 g/dL    Hematocrit 39.9 37.0 - 48.5 %    MCV 81 (L) 82 - 98 fL    MCH 25.3 (L) 27.0 - 31.0 pg    MCHC 31.1 (L) 32.0 - 36.0 g/dL    RDW 16.1 (H) 11.5 - 14.5 %    Platelets 298 150 - 450 K/uL    MPV 9.5 9.2 - 12.9 fL    Immature Granulocytes 0.4 0.0 - 0.5 %    Gran # (ANC) 9.7  (H) 1.8 - 7.7 K/uL    Immature Grans (Abs) 0.07 (H) 0.00 - 0.04 K/uL    Lymph # 5.1 (H) 1.0 - 4.8 K/uL    Mono # 0.8 0.3 - 1.0 K/uL    Eos # 0.3 0.0 - 0.5 K/uL    Baso # 0.07 0.00 - 0.20 K/uL    nRBC 0 0 /100 WBC    Gran % 60.6 38.0 - 73.0 %    Lymph % 31.8 18.0 - 48.0 %    Mono % 4.8 4.0 - 15.0 %    Eosinophil % 2.0 0.0 - 8.0 %    Basophil % 0.4 0.0 - 1.9 %    Differential Method Automated    Comprehensive metabolic panel    Collection Time: 02/07/25  5:04 PM   Result Value Ref Range    Sodium 141 136 - 145 mmol/L    Potassium 4.4 3.5 - 5.1 mmol/L    Chloride 107 95 - 110 mmol/L    CO2 23 23 - 29 mmol/L    Glucose 87 70 - 110 mg/dL    BUN 22 8 - 23 mg/dL    Creatinine 1.2 0.5 - 1.4 mg/dL    Calcium 10.1 8.7 - 10.5 mg/dL    Total Protein 7.5 6.0 - 8.4 g/dL    Albumin 3.7 3.5 - 5.2 g/dL    Total Bilirubin 0.3 0.1 - 1.0 mg/dL    Alkaline Phosphatase 123 40 - 150 U/L    AST 13 10 - 40 U/L    ALT 12 10 - 44 U/L    eGFR 49 (A) >60 mL/min/1.73 m^2    Anion Gap 11 8 - 16 mmol/L   Urinalysis - Cath.    Collection Time: 02/07/25  5:29 PM   Result Value Ref Range    Specimen UA Urine, Catheterized     Color, UA Colorless (A) Yellow, Straw, Judith    Appearance, UA Clear Clear    pH, UA 5.0 5.0 - 8.0    Specific Gravity, UA 1.010 1.005 - 1.030    Protein, UA Negative Negative    Glucose, UA Negative Negative    Ketones, UA Negative Negative    Bilirubin (UA) Negative Negative    Occult Blood UA Negative Negative    Nitrite, UA Negative Negative    Urobilinogen, UA Negative <2.0 EU/dL    Leukocytes, UA 1+ (A) Negative   Urinalysis Microscopic    Collection Time: 02/07/25  5:29 PM   Result Value Ref Range    WBC, UA 1 0 - 5 /hpf    Hyaline Casts, UA 55 (A) 0-1/lpf /lpf    Microscopic Comment SEE COMMENT         Imaging Results:  Imaging Results              X-Ray Lumbar Spine Ap And Lateral (Final result)  Result time 02/07/25 17:19:43   Procedure changed from X-Ray Lumbar Spine 5 View     Final result by Angel Strickland MD  (02/07/25 17:19:43)                   Impression:      No acute abnormality.      Electronically signed by: Angel Barfieldi  Date:    02/07/2025  Time:    17:19               Narrative:    EXAMINATION:  XR LUMBAR SPINE AP AND LATERAL    CLINICAL HISTORY:  XR LUMBAR SPINE AP AND LATERAL    COMPARISON:  None    FINDINGS:  Multiple radiographic views  were obtained.    No evidence of acute fracture or dislocation.  Mild moderate multilevel degenerative disc disease.  Atherosclerotic changes of the aorta.                                              The Emergency Provider reviewed the vital signs and test results, which are outlined above.     ED Discussion       6:32 PM: Reassessed pt at this time. Discussed with pt all pertinent ED information and results. Discussed pt dx and plan of tx. Gave pt all f/u and return to the ED instructions. All questions and concerns were addressed at this time. Pt expresses understanding of information and instructions, and is comfortable with plan to discharge. Pt is stable for discharge.    I discussed with patient and/or family/caretaker that evaluation in the ED does not suggest any emergent or life threatening medical conditions requiring immediate intervention beyond what was provided in the ED, and I believe patient is safe for discharge.  Regardless, an unremarkable evaluation in the ED does not preclude the development or presence of a serious of life threatening condition. As such, patient was instructed to return immediately for any worsening or change in current symptoms.         Medical Decision Making  68 yo female c/o lower left back pain that goes into her left buttock and hip.  She says she has had problems with her back in the past.  Denies any recent injury.  Pain is worse with movement.  Denies any bowel or bladder problems.  She is NVT. Lspine negative.     Amount and/or Complexity of Data Reviewed  External Data Reviewed: labs, radiology, ECG and notes.  Labs:  ordered. Decision-making details documented in ED Course.  Radiology: ordered and independent interpretation performed. Decision-making details documented in ED Course.  ECG/medicine tests: ordered and independent interpretation performed. Decision-making details documented in ED Course.    Risk  OTC drugs.  Prescription drug management.                ED Medication(s):  Medications   ketorolac injection 15 mg (15 mg Intravenous Given 2/7/25 1750)       New Prescriptions    TRAMADOL (ULTRAM) 50 MG TABLET    Take 1 tablet (50 mg total) by mouth every 6 (six) hours as needed.        Follow-up Information       Schedule an appointment as soon as possible for a visit  with Claudio Christianson Jr., MD.    Specialty: Family Medicine  Why: As needed  Contact information:  [id number] [facility] [id number] [phone]                                 Scribe Attestation:   Scribe #1: I performed the above scribed service and the documentation accurately describes the services I performed. I attest to the accuracy of the note.     Attending:   Physician Attestation Statement for Scribe #1: I, Valery Parker MD, personally performed the services described in this documentation, as scribed by Chele Mendoza and Nehemias Serrano, in my presence, and it is both accurate and complete.           Clinical Impression       ICD-10-CM ICD-9-CM   1. Strain of lumbar region, initial encounter  S39.012A 847.2       Disposition:   Disposition: Discharged  Condition: Stable        Valery Parker MD  02/08/25 1008

## 2025-02-08 NOTE — ED NOTES
Pt left without paper prescription. Called pt back and she states she is all the way home . Asked to call rx to her pharmacy/[facility]. Called and left message , as they are closed.

## 2025-04-30 ENCOUNTER — OFFICE VISIT (OUTPATIENT)
Dept: SPORTS MEDICINE | Facility: CLINIC | Age: 70
End: 2025-04-30
Payer: MEDICARE

## 2025-04-30 VITALS — WEIGHT: 183.63 LBS | BODY MASS INDEX: 34.67 KG/M2 | HEIGHT: 61 IN

## 2025-04-30 DIAGNOSIS — M17.0 PRIMARY OSTEOARTHRITIS OF BOTH KNEES: Primary | ICD-10-CM

## 2025-04-30 DIAGNOSIS — M25.561 CHRONIC PAIN OF RIGHT KNEE: ICD-10-CM

## 2025-04-30 DIAGNOSIS — G89.29 CHRONIC PAIN OF RIGHT KNEE: ICD-10-CM

## 2025-04-30 PROCEDURE — 99999 PR PBB SHADOW E&M-EST. PATIENT-LVL III: CPT | Mod: PBBFAC,,, | Performed by: STUDENT IN AN ORGANIZED HEALTH CARE EDUCATION/TRAINING PROGRAM

## 2025-04-30 RX ORDER — TRIAMCINOLONE ACETONIDE 40 MG/ML
40 INJECTION, SUSPENSION INTRA-ARTICULAR; INTRAMUSCULAR
Status: DISCONTINUED | OUTPATIENT
Start: 2025-04-30 | End: 2025-04-30 | Stop reason: HOSPADM

## 2025-04-30 RX ADMIN — TRIAMCINOLONE ACETONIDE 40 MG: 40 INJECTION, SUSPENSION INTRA-ARTICULAR; INTRAMUSCULAR at 09:04

## 2025-04-30 NOTE — PROCEDURES
Large Joint Aspiration/Injection: R knee    Date/Time: 4/30/2025 9:20 AM    Performed by: Joel Nelson MD  Authorized by: Joel Nelson MD    Consent Done?:  Yes (Verbal)  Indications:  Arthritis and pain  Site marked: the procedure site was marked    Timeout: prior to procedure the correct patient, procedure, and site was verified    Prep: patient was prepped and draped in usual sterile fashion    Local anesthetic:  Bupivacaine 0.5% without epinephrine and lidocaine 1% without epinephrine    Details:  Needle Size:  21 G  Ultrasonic Guidance for needle placement?: Yes    Images are saved and documented.  Approach:  Lateral (superior)  Location:  Knee  Site:  R knee  Medications:  40 mg triamcinolone acetonide 40 mg/mL  Patient tolerance:  Patient tolerated the procedure well with no immediate complications     Ultrasound guidance was used for needle localization. Images were saved and stored for documentation. The appropriate structures were visualized. Dynamic visualization of the needle was continuous throughout the procedures and maintained good position.

## 2025-04-30 NOTE — PROGRESS NOTES
Patient ID: Jacqueline Luna  YOB: 1955  MRN: [id number]    Chief Complaint: Pain of the Right Knee and Follow-up      Referred By: KARSON Angel      History of Present Illness: Jacqueline Luna is a 69 y.o. female who presents today for a followup on right knee pain.  She was last seen 1/15/2025 and received steroid injection, injection has recently worn off and she's noticed a limp while walking, which became increasingly painful. The pain has now become severe enough to interfere with daily activities. She describes the pain as primarily located in the medial joint line of her right knee.  She  has been taking meloxicam and wearing a hinge knee brace that her knee pain is much better. She is here to try another steroid injection today.        01/15/2025 Interval History of Present Illness: Jacqueline Luna is a 69 y.o. female who presents today with right knee pain.  A few months ago, he patient noticed a limp while walking, which became increasingly painful. The pain has now become severe enough to interfere with daily activities. She describes the pain as primarily located in the medial joint line of her right knee.  She was seen by KARSON Meyer, patient states that since she has been taking meloxicam and wearing a hinge knee brace that her knee pain is much better. She is here to be evaluated for knee pain and swelling.           Past Medical History:   Past Medical History:   Diagnosis Date    Anxiety disorder, unspecified     COPD (chronic obstructive pulmonary disease)     Depression     Family history of malignant neoplasm of breast 08/08/2023    Fibrocystic breast     HTN (hypertension)     Hypercholesteremia     Mass of multiple sites of left breast 08/07/2023    Mass of multiple sites of right breast 08/07/2023    Mastodynia 08/08/2023    Mild intermittent asthma, uncomplicated     Pneumonia, unspecified organism     Type 2 diabetes mellitus without complications      Past Surgical  History:   Procedure Laterality Date    CLOSED REDUCTION, FINGER, WITH PINNING Right 2023    Procedure: CLOSED REDUCTION, FINGER, WITH PINNING;  Surgeon: Nawaf Mcclure MD;  Location: [facility] OR;  Service: Orthopedics;  Laterality: Right;  closed reduction and pinning right thumb proximal phalanx fracture    CORONARY ANGIOGRAPHY N/A 2021    Procedure: ANGIOGRAM, CORONARY ARTERY;  Surgeon: Shruti Harrison MD;  Location: [facility] CATH LAB;  Service: Cardiology;  Laterality: N/A;    CORONARY ANGIOPLASTY WITH STENT PLACEMENT N/A 2021    Procedure: Angioplasty, Coronary Artery, With Stent Insertion;  Surgeon: Shruti Harrison MD;  Location: [facility] CATH LAB;  Service: Cardiology;  Laterality: N/A;    LEFT HEART CATHETERIZATION Left 2021    Procedure: CATHETERIZATION, HEART, LEFT;  Surgeon: Shruti Harrison MD;  Location: [facility] CATH LAB;  Service: Cardiology;  Laterality: Left;    REFRACTIVE SURGERY Bilateral 10/2024    Dr Kvng Mercer     Family History   Problem Relation Name Age of Onset    Diabetes Mother      Hypertension Mother      Cancer Mother      Cancer Father      COPD Father      Breast cancer Sister          dx 40's    Breast cancer Sister  74    Breast cancer Maternal Aunt      Breast cancer Maternal Aunt      Ovarian cancer Neg Hx       Social History     Socioeconomic History    Marital status: Single   Tobacco Use    Smoking status: Former     Current packs/day: 0.00     Average packs/day: 0.5 packs/day for 35.2 years (17.6 ttl pk-yrs)     Types: Cigarettes     Start date: 1986     Quit date: 3/5/2021     Years since quittin.1    Smokeless tobacco: Never   Substance and Sexual Activity    Alcohol use: Yes     Comment: occassional    Drug use: Never    Sexual activity: Not Currently     Birth control/protection: Abstinence     Social Drivers of Health     Financial Resource Strain: Low Risk  (2025)    Overall Financial Resource Strain (CARDIA)     Difficulty of Paying  Living Expenses: Not hard at all   Food Insecurity: No Food Insecurity (4/23/2025)    Hunger Vital Sign     Worried About Running Out of Food in the Last Year: Never true     Ran Out of Food in the Last Year: Never true   Transportation Needs: No Transportation Needs (4/23/2025)    PRAPARE - Transportation     Lack of Transportation (Medical): No     Lack of Transportation (Non-Medical): No   Physical Activity: Unknown (4/23/2025)    Exercise Vital Sign     Days of Exercise per Week: 0 days   Recent Concern: Physical Activity - Inactive (4/8/2025)    Received from [facility] Subsidiaries and Affiliates    Exercise Vital Sign     Days of Exercise per Week: 0 days     Minutes of Exercise per Session: 0 min   Stress: No Stress Concern Present (4/23/2025)    [employer] [facility] - Occupational Stress Questionnaire     Feeling of Stress : Only a little   Recent Concern: Stress - Stress Concern Present (4/8/2025)    Received from [facility] Subsidi[facility] and [facility]    [employer] [facility] - Occupational Stress Questionnaire     Feeling of Stress : To some extent   Housing Stability: Low Risk  (4/23/2025)    Housing Stability Vital Sign     Unable to Pay for Housing in the Last Year: No     Number of Times Moved in the Last Year: 0     Homeless in the Last Year: No     Medication List with Changes/Refills   Current Medications    ALBUTEROL (PROVENTIL/VENTOLIN HFA) 90 MCG/ACTUATION INHALER    Inhale 2 puffs into the lungs every 4 (four) hours as needed for Wheezing or Shortness of Breath. Rescue    AMLODIPINE (NORVASC) 10 MG TABLET    Take 10 mg by mouth once daily.    ATORVASTATIN (LIPITOR) 80 MG TABLET    Take 1 tablet (80 mg total) by mouth every evening.    BUDESONIDE-GLYCOPYR-FORMOTEROL 160-9-4.8 MCG/ACTUATION HFAA    Inhale 2 puffs into the lungs 2 (two) times daily. Wash out mouth  after use.    CHOLECALCIFEROL, VITAMIN D3, 1,250 MCG (50,000 UNIT) CAPSULE    Take 50,000 Units by mouth every 7 days.    CLOPIDOGREL (PLAVIX) 75 MG TABLET    TAKE 1 TABLET EVERY DAY    EZETIMIBE (ZETIA) 10 MG TABLET    Take 10 mg by mouth once daily.    FUROSEMIDE (LASIX) 20 MG TABLET    Take 1 tablet (20 mg total) by mouth daily as needed (swelling).    LISINOPRIL-HYDROCHLOROTHIAZIDE (PRINZIDE,ZESTORETIC) 20-12.5 MG PER TABLET    Take 1 tablet by mouth 2 (two) times a day.    LORATADINE (CLARITIN) 10 MG TABLET    Take 10 mg by mouth daily as needed.    METFORMIN (GLUCOPHAGE-XR) 500 MG ER 24HR TABLET    Take 500 mg by mouth once daily.    METOPROLOL SUCCINATE (TOPROL-XL) 100 MG 24 HR TABLET    Take 1 tablet (100 mg total) by mouth once daily.    ONDANSETRON (ZOFRAN) 4 MG TABLET    Take 1 tablet (4 mg total) by mouth every 6 (six) hours as needed for Nausea.    PANTOPRAZOLE (PROTONIX) 40 MG TABLET    Take 40 mg by mouth once daily.    RIFAXIMIN (XIFAXAN) 550 MG TAB    Take 550 mg by mouth 3 (three) times daily.    SERTRALINE (ZOLOFT) 100 MG TABLET    Take 100 mg by mouth once daily.    TRAMADOL (ULTRAM) 50 MG TABLET    Take 1 tablet (50 mg total) by mouth every 6 (six) hours as needed.    TRUE METRIX GLUCOSE TEST STRIP STRP    SMARTSIG:Via Meter    TRUEPLUS LANCETS 33 GAUGE MISC    1 lancet  by Each Nostril route 2 (two) times a day.     Review of patient's allergies indicates:  No Known Allergies    Physical Exam:   Body mass index is 34.7 kg/m².    GENERAL: Well appearing, in no acute distress.  HEAD: Normocephalic and atraumatic.  ENT: External ears and nose grossly normal.  EYES: EOMI bilaterally  PULMONARY: Respirations are grossly even and non-labored.  NEURO: Awake, alert, and oriented x 3.  SKIN: No obvious rashes appreciated.  PSYCH: Mood & affect are appropriate.    Detailed MSK exam:     Right knee exam:   -ROM: extension 0, flexion 130  -TTP: Medial joint line and Popliteal fossa  -effusion:  none  -Patellar apprehension negative  -Katy test positive -  medial  -stable to varus and valgus stress tests  -Lachman test negative, anterior drawer test negative, posterior drawer test negative        Imaging:  X-Ray Lumbar Spine Ap And Lateral  Narrative: EXAMINATION:  XR LUMBAR SPINE AP AND LATERAL    CLINICAL HISTORY:  XR LUMBAR SPINE AP AND LATERAL    COMPARISON:  None    FINDINGS:  Multiple radiographic views  were obtained.    No evidence of acute fracture or dislocation.  Mild moderate multilevel degenerative disc disease.  Atherosclerotic changes of the aorta.  Impression: No acute abnormality.    Electronically signed by: Angel Strickland  Date:    02/07/2025  Time:    17:19        Relevant imaging results were reviewed and interpreted by me and per my read shows mild medial compartment joint space narrowing.  This was discussed with the patient and / or family today.     Assessment:  Jacqueline Luna is a 69 y.o. female following up for right knee pain.   Steroid injection lasted 2 months and is interested in repeating again today.   Plan: Steroid injection given today (see separate procedure note for details). We discussed the proper protocols after the injection such as no submerging pools, baths tubs, or hot tubs for 24 hr.  Showering is okay today.  We also discussed that blood sugars can be elevated after an injection and asked patient to properly checked her sugars over the next few days and contact their PCP if there are any concerns.  We discussed red flags such as fevers, chills, red, warm, tender joint at the area of injection to please seek medical care immediately.   Consider gel injection if not improving. Continue conservative management for pain.   Follow up as needed. All questions answered.      Primary osteoarthritis of both knees  -     Sports Medicine US - Guidance for Needle Placement  -     Large Joint Aspiration/Injection: R knee    Chronic pain of right knee  -     Sports  Medicine US - Guidance for Needle Placement         Ultrasound guidance was used for needle localization. Images were saved and stored for documentation. The appropriate structures were visualized. Dynamic visualization of the needle was continuous throughout the procedures and maintained good position.      MEDICAL NECESSITY FOR VISCOSUPPLEMENTATION: After thorough evaluation of the patient, I have determined that visco-supplementation is medically necessary. The patient has painful degenerative changes of the knee with failure of conservative treatments including lifestyle modifications and rehabilitation exercises.  Oral analgesis/NSAIDs have not adequately controlled symptoms and there is radiographic evidence of Kellgren Aneudy grade 2 or greater osteoarthritic changes, or in lack of radiographic evidence, there is arthroscopic or other evidence of chondrosis.       A copy of today's visit note has been sent to the referring provider.     Electronically signed:  Joel Nelson MD, MPH  04/30/2025  10:15 AM

## 2025-04-30 NOTE — PATIENT INSTRUCTIONS
Assessment:  Jacqueline Luna is a 69 y.o. female   Chief Complaint   Patient presents with    Right Knee - Pain    Follow-up       No diagnosis found.     Plan:  Ultrasound guided cortisone injection to the right knee  We discussed the proper protocols after the injection such as no submerging pools, baths tubs, or hot tubs for 24 hr.  Showering is okay today.  We also discussed that blood sugars can be elevated after an injection and asked patient to properly checked her sugars over the next few days and contact their PCP if there are any concerns.  We discussed red flags such as fevers, chills, red, warm, tender joint at the area of injection to please seek medical care immediately.    Follow up as needed    Follow-up: as needed.    Thank you for choosing [facility] and Dr. Joel Nelson for your orthopedic & sports medicine care. It is our goal to provide you with exceptional care that will help keep you healthy, active, and get you back in the game.    Please do not hesitate to reach out to us via email, phone, or MyChart with any questions, concerns, or feedback.    If you felt that you received exemplary care today, please consider leaving us feedback on [employer] at:  [email or web address]    If you are experiencing pain/discomfort ,or have questions after 5pm and would like to be connected to the [facility] on-call team, please call this number and specify which Sports Medicine provider is treating you: [phone]

## 2025-04-30 NOTE — PROCEDURES
Sports Medicine US - Guidance for Needle Placement    Date/Time: 4/30/2025 9:20 AM    Performed by: Joel Nelson MD  Authorized by: Joel Nelson MD  Preparation: Patient was prepped and draped in the usual sterile fashion.  Local anesthesia used: no    Anesthesia:  Local anesthesia used: no    Sedation:  Patient sedated: no    Patient tolerance: patient tolerated the procedure well with no immediate complications  Comments: Ultrasound guidance was used for needle localization. Images were saved and stored for documentation. The appropriate structures were visualized. Dynamic visualization of the needle was continuous throughout the procedures and maintained good position.

## 2025-05-12 ENCOUNTER — CLINICAL SUPPORT (OUTPATIENT)
Dept: PULMONOLOGY | Facility: CLINIC | Age: 70
End: 2025-05-12
Payer: MEDICARE

## 2025-05-12 ENCOUNTER — OFFICE VISIT (OUTPATIENT)
Dept: PULMONOLOGY | Facility: CLINIC | Age: 70
End: 2025-05-12
Payer: MEDICARE

## 2025-05-12 VITALS
RESPIRATION RATE: 12 BRPM | WEIGHT: 188.94 LBS | WEIGHT: 189 LBS | DIASTOLIC BLOOD PRESSURE: 72 MMHG | OXYGEN SATURATION: 93 % | HEIGHT: 61 IN | SYSTOLIC BLOOD PRESSURE: 162 MMHG | HEART RATE: 73 BPM | HEIGHT: 61 IN | BODY MASS INDEX: 35.67 KG/M2 | BODY MASS INDEX: 35.68 KG/M2

## 2025-05-12 DIAGNOSIS — J44.9 COPD, MILD: ICD-10-CM

## 2025-05-12 DIAGNOSIS — J43.9 PULMONARY EMPHYSEMA, UNSPECIFIED EMPHYSEMA TYPE: ICD-10-CM

## 2025-05-12 DIAGNOSIS — Z87.891 STOPPED SMOKING WITH GREATER THAN 20 PACK YEAR HISTORY: ICD-10-CM

## 2025-05-12 DIAGNOSIS — R91.1 LUNG NODULE: ICD-10-CM

## 2025-05-12 DIAGNOSIS — R06.02 SOB (SHORTNESS OF BREATH): ICD-10-CM

## 2025-05-12 DIAGNOSIS — J43.9 PULMONARY EMPHYSEMA, UNSPECIFIED EMPHYSEMA TYPE: Primary | ICD-10-CM

## 2025-05-12 LAB
BRPFT: ABNORMAL
FEF 25 75 CHG: -11.2 %
FEF 25 75 LLN: 1.11
FEF 25 75 POST REF: 34.6 %
FEF 25 75 PRE REF: 38.9 %
FEF 25 75 REF: 2.51
FET100 CHG: 31.8 %
FEV1 CHG: 4 %
FEV1 FVC CHG: -2.8 %
FEV1 FVC LLN: 66
FEV1 FVC POST REF: 88.9 %
FEV1 FVC PRE REF: 91.4 %
FEV1 FVC REF: 79
FEV1 LLN: 1.21
FEV1 POST REF: 82.9 %
FEV1 PRE REF: 79.7 %
FEV1 REF: 1.73
FVC CHG: 7 %
FVC LLN: 1.56
FVC POST REF: 92.8 %
FVC PRE REF: 86.8 %
FVC REF: 2.2
PEF CHG: -1 %
PEF LLN: 2.63
PEF POST REF: 76.1 %
PEF PRE REF: 76.8 %
PEF REF: 4.45
POST FEF 25 75: 0.87 L/S (ref 1.11–3.91)
POST FET 100: 9.53 SEC
POST FEV1 FVC: 70.25 % (ref 66.12–90.18)
POST FEV1: 1.43 L (ref 1.21–2.23)
POST FVC: 2.04 L (ref 1.56–2.87)
POST PEF: 3.39 L/S (ref 2.63–6.27)
PRE FEF 25 75: 0.98 L/S (ref 1.11–3.91)
PRE FET 100: 7.24 SEC
PRE FEV1 FVC: 72.24 % (ref 66.12–90.18)
PRE FEV1: 1.38 L (ref 1.21–2.23)
PRE FVC: 1.91 L (ref 1.56–2.87)
PRE PEF: 3.42 L/S (ref 2.63–6.27)

## 2025-05-12 PROCEDURE — 99999 PR PBB SHADOW E&M-EST. PATIENT-LVL I: CPT | Mod: PBBFAC,,,

## 2025-05-12 PROCEDURE — 99999 PR PBB SHADOW E&M-EST. PATIENT-LVL IV: CPT | Mod: PBBFAC,,, | Performed by: INTERNAL MEDICINE

## 2025-05-12 NOTE — PROGRESS NOTES
"                                         Pulmonary Outpatient Follow Up Visit     Subjective:       Patient ID: Jacqueline Luna is a 69 y.o. female.    Chief Complaint: Rev Test        History of Present Illness    CHIEF COMPLAINT:  Patient presents today for follow up of breathing problems    RESPIRATORY HISTORY:  She recently visited the emergency room at [facility] due to significant fluid retention requiring intravenous treatment. She has a history of pneumonia in the summer. Lung scan in August confirmed pneumonia. Previous scan in April 2023 showed a 4mm lung nodule.    MEDICATIONS:  She currently uses a rescue inhaler and Breztri.    SLEEP APNEA:  Previous CPAP trial with three different devices was unsuccessful due to experiencing high levels of anxiety with use.    SOCIAL HISTORY:  She quit smoking in 2014 after smoking one pack per day from 1986 to 2014.                   Review of Systems   Constitutional:  Negative for fever.   HENT:  Negative for nosebleeds.    Respiratory:  Positive for shortness of breath, dyspnea on extertion and use of rescue inhaler.    Psychiatric/Behavioral:  Negative for confusion.        Encounter Medications[1]    Objective:     Vital Signs (Most Recent)  Vital Signs  Pulse: 73  Resp: 12  SpO2: (!) 93 %  BP: (!) 162/72  Height and Weight  Height: 5' 1" (154.9 cm)  Weight: 85.7 kg (188 lb 15 oz)  BSA (Calculated - sq m): 1.92 sq meters  BMI (Calculated): 35.7  Weight in (lb) to have BMI = 25: 132]  Wt Readings from Last 2 Encounters:   05/12/25 85.7 kg (188 lb 15 oz)   05/12/25 85.7 kg (189 lb)       Physical Exam   Constitutional: She is oriented to person, place, and time. She appears well-developed. No distress.   HENT:   Head: Normocephalic.   Pulmonary/Chest: Normal expansion and effort normal. No stridor. No respiratory distress. She has decreased breath sounds. She has no wheezes. She has no rhonchi.   Neurological: She is alert and oriented to person, place, and time. " "  Psychiatric: She has a normal mood and affect. Her behavior is normal. Judgment and thought content normal.   Nursing note and vitals reviewed.      Laboratory  Lab Results   Component Value Date    WBC 16.10 (H) 02/07/2025    RBC 4.91 02/07/2025    HGB 12.4 02/07/2025    HCT 39.9 02/07/2025    MCV 81 (L) 02/07/2025    MCH 25.3 (L) 02/07/2025    MCHC 31.1 (L) 02/07/2025    RDW 16.1 (H) 02/07/2025     02/07/2025    MPV 9.5 02/07/2025    GRAN 9.7 (H) 02/07/2025    GRAN 60.6 02/07/2025    LYMPH 5.1 (H) 02/07/2025    LYMPH 31.8 02/07/2025    MONO 0.8 02/07/2025    MONO 4.8 02/07/2025    EOS 0.3 02/07/2025    BASO 0.07 02/07/2025    EOSINOPHIL 2.0 02/07/2025    BASOPHIL 0.4 02/07/2025       BMP  Lab Results   Component Value Date     02/07/2025    K 4.4 02/07/2025     02/07/2025    CO2 23 02/07/2025    BUN 22 02/07/2025    CREATININE 1.2 02/07/2025    CALCIUM 10.1 02/07/2025    ANIONGAP 11 02/07/2025    ESTGFRAFRICA >60 03/01/2022    EGFRNONAA >60 03/01/2022    AST 13 02/07/2025    ALT 12 02/07/2025    PROT 7.5 02/07/2025       Lab Results   Component Value Date    BNP 30 08/29/2024    BNP <10 04/29/2024    BNP 29 04/19/2024    BNP 13 11/17/2022    BNP 25 03/12/2021       Lab Results   Component Value Date    TSH 1.040 12/05/2024       No results found for: "SEDRATE"    No results found for: "CRP"  No results found for: "IGE"     No results found for: "ASPERGILLUS"  No results found for: "AFUMIGATUSCL"     No results found for: "ACE"     Diagnostic Results:  I have personally reviewed today the following studies:  As above  Slight improvement in FEV1 and FVC compared to prior spirometry.      No need for O2 based on 6 minute walk.  Assessment/Plan:   Pulmonary emphysema, unspecified emphysema type  -     Discontinue: budesonide-glycopyr-formoterol 160-9-4.8 mcg/actuation HFAA; Inhale 2 puffs into the lungs 2 (two) times daily. Wash out mouth after use.  Dispense: 10.7 g; Refill: 11  -     " budesonide-glycopyr-formoterol 160-9-4.8 mcg/actuation HFAA; Inhale 2 puffs into the lungs 2 (two) times daily. Wash out mouth after use.  Dispense: 10.7 g; Refill: 11    COPD, mild    Stopped smoking with greater than 20 pack year history    Lung nodule      Assessment & Plan    J43.9 Pulmonary emphysema, unspecified emphysema type  J44.9 COPD, mild  Z87.891 Stopped smoking with greater than 20 pack year history  R91.1 Lung nodule    IMPRESSION:  - Assessed COPD status and medication regimen.  - Noted history of smoking cessation in 2014 after 28 years of smoking.  - Reviewed previous CT from August showing pneumonia and small 4 mm lung nodule.  - Determined patient qualifies for annual low-dose CT screening for lung cancer until 2029 (15 years post-smoking cessation).    COPD, MILD:  - Discussed and reinforced importance of continuing to rinse mouth after using Breztri inhaler.  - Continued Breztri inhaler.  - Continued rescue inhaler.  - Follow up in 6 months.    STOPPED SMOKING WITH GREATER THAN 20 PACK YEAR HISTORY:  - Explained rationale for annual low-dose CT screening for lung cancer based on smoking history.    LUNG NODULE:  - Ordered low-dose CT Lungs for August (3 months from now).  - CT results to be communicated via [employer]Decide.  - Contact the office if scan results are abnormal.         Follow up in about 6 months (around 11/12/2025).    This note was prepared using voice recognition system and is likely to have sound alike errors that may have been overlooked even after proof reading.  Please call me with any questions    Discussed diagnosis, its evaluation, treatment and usual course. All questions answered.      Verona Harrison MD         [1]   Outpatient Encounter Medications as of 5/12/2025   Medication Sig Dispense Refill    albuterol (PROVENTIL/VENTOLIN HFA) 90 mcg/actuation inhaler Inhale 2 puffs into the lungs every 4 (four) hours as needed for Wheezing or Shortness of Breath. Rescue 54 g 3     amLODIPine (NORVASC) 10 MG tablet Take 10 mg by mouth once daily.      cholecalciferol, vitamin D3, 1,250 mcg (50,000 unit) capsule Take 50,000 Units by mouth every 7 days.      clopidogreL (PLAVIX) 75 mg tablet TAKE 1 TABLET EVERY DAY 90 tablet 3    ezetimibe (ZETIA) 10 mg tablet Take 10 mg by mouth once daily.      furosemide (LASIX) 20 MG tablet Take 1 tablet (20 mg total) by mouth daily as needed (swelling). (Patient taking differently: Take 20 mg by mouth daily as needed (swelling). daily) 30 tablet 11    lisinopriL-hydrochlorothiazide (PRINZIDE,ZESTORETIC) 20-12.5 mg per tablet Take 1 tablet by mouth 2 (two) times a day.      loratadine (CLARITIN) 10 mg tablet Take 10 mg by mouth daily as needed.      metFORMIN (GLUCOPHAGE-XR) 500 MG ER 24hr tablet Take 500 mg by mouth once daily.      metoprolol succinate (TOPROL-XL) 100 MG 24 hr tablet Take 1 tablet (100 mg total) by mouth once daily. 30 tablet 11    ondansetron (ZOFRAN) 4 MG tablet Take 1 tablet (4 mg total) by mouth every 6 (six) hours as needed for Nausea. 12 tablet 0    pantoprazole (PROTONIX) 40 MG tablet Take 40 mg by mouth once daily.      rifAXIMin (XIFAXAN) 550 mg Tab Take 550 mg by mouth 3 (three) times daily.      sertraline (ZOLOFT) 100 MG tablet Take 100 mg by mouth once daily.      traMADoL (ULTRAM) 50 mg tablet Take 1 tablet (50 mg total) by mouth every 6 (six) hours as needed. 12 tablet 0    TRUE METRIX GLUCOSE TEST STRIP Strp SMARTSIG:Via Meter      TRUEPLUS LANCETS 33 gauge Misc 1 lancet  by Each Nostril route 2 (two) times a day.      [DISCONTINUED] budesonide-glycopyr-formoterol 160-9-4.8 mcg/actuation HFAA Inhale 2 puffs into the lungs 2 (two) times daily. Wash out mouth after use. 10.7 g 11    atorvastatin (LIPITOR) 80 MG tablet Take 1 tablet (80 mg total) by mouth every evening. 30 tablet 11    budesonide-glycopyr-formoterol 160-9-4.8 mcg/actuation HFAA Inhale 2 puffs into the lungs 2 (two) times daily. Wash out mouth after use. 10.7 g  11    [DISCONTINUED] budesonide-glycopyr-formoterol 160-9-4.8 mcg/actuation HFAA Inhale 2 puffs into the lungs 2 (two) times daily. Wash out mouth after use. 10.7 g 11     Facility-Administered Encounter Medications as of 5/12/2025   Medication Dose Route Frequency Provider Last Rate Last Admin    chlorhexidine 0.12 % solution 10 mL  10 mL Mouth/Throat On Call Procedure Cassidy Malik PA-C   10 mL at 08/11/23 0591

## 2025-05-12 NOTE — PROCEDURES
"[facility] - Pulmonary Function  Six Minute Walk     SUMMARY     Ordering Provider: Dr. Larios   Interpreting Provider: Dr. Harrison  Performing nurse/tech/RT: EDUARDO Wren RRT  Diagnosis: Emphysema  Height: 5' 1" (154.9 cm)  Weight: 85.7 kg (189 lb)  BMI (Calculated): 35.7                Phase Oxygen Assessment Supplemental O2 Heart   Rate Blood Pressure Wade Dyspnea Scale Rating   Resting 93 % Room Air 73 bpm 162/72 3   Exercise        Minute        1 93 % Room Air 71 bpm     2 95 % Room Air 74 bpm     3 94 % Room Air 74 bpm     4 94 % Room Air 74 bpm     5 96 % Room Air 74 bpm     6  95 % Room Air 75 bpm 180/77 5-6   Recovery        Minute        1 95 % Room Air 71 bpm     2 97 % Room Air 65 bpm     3 97 % Room Air 66 bpm     4 95 % Room Air 65 bpm 174/74 4     Six Minute Walk Summary  6MWT Status: completed without stopping  Patient Reported: Dyspnea, Leg/hip pain, Lightheadedness     Interpretation:  Did the patient stop or pause?: No                                         Total Time Walked (Calculated): 360 seconds  Final Partial Lap Distance (feet): 0 feet  Total Distance Meters (Calculated): 365.76 meters  Predicted Distance Meters (Calculated): 398.7 meters  Percentage of Predicted (Calculated): 91.74  Peak VO2 (Calculated): 14.95  Mets: 4.27  Has The Patient Had a Previous Six Minute Walk Test?: Yes       Previous 6MWT Results  Has The Patient Had a Previous Six Minute Walk Test?: Yes  Date of Previous Test: 04/03/23  Total Time Walked: 360 seconds  Total Distance (meters): 274.32  Predicted Distance (meters): 418.79 meters  Percentage of Predicted: 65.5  Percent Change (Calculated): -0.33    "

## 2025-05-18 ENCOUNTER — HOSPITAL ENCOUNTER (EMERGENCY)
Facility: HOSPITAL | Age: 70
Discharge: HOME | End: 2025-05-18
Attending: EMERGENCY MEDICINE
Payer: MEDICARE

## 2025-05-18 VITALS
SYSTOLIC BLOOD PRESSURE: 156 MMHG | TEMPERATURE: 98 F | RESPIRATION RATE: 16 BRPM | BODY MASS INDEX: 35.14 KG/M2 | OXYGEN SATURATION: 95 % | HEART RATE: 67 BPM | WEIGHT: 186 LBS | DIASTOLIC BLOOD PRESSURE: 75 MMHG

## 2025-05-18 DIAGNOSIS — R11.2 NAUSEA & VOMITING: ICD-10-CM

## 2025-05-18 DIAGNOSIS — F41.9 ANXIETY: ICD-10-CM

## 2025-05-18 DIAGNOSIS — I10 CHRONIC HYPERTENSION: ICD-10-CM

## 2025-05-18 DIAGNOSIS — R19.7 NAUSEA VOMITING AND DIARRHEA: Primary | ICD-10-CM

## 2025-05-18 DIAGNOSIS — R06.02 SOB (SHORTNESS OF BREATH): ICD-10-CM

## 2025-05-18 DIAGNOSIS — R11.2 NAUSEA VOMITING AND DIARRHEA: Primary | ICD-10-CM

## 2025-05-18 LAB
ABSOLUTE EOSINOPHIL (OHS): 0.39 K/UL
ABSOLUTE MONOCYTE (OHS): 1.2 K/UL (ref 0.3–1)
ABSOLUTE NEUTROPHIL COUNT (OHS): 8.78 K/UL (ref 1.8–7.7)
ALBUMIN SERPL BCP-MCNC: 3.6 G/DL (ref 3.5–5.2)
ALP SERPL-CCNC: 120 UNIT/L (ref 40–150)
ALT SERPL W/O P-5'-P-CCNC: 19 UNIT/L (ref 10–44)
ANION GAP (OHS): 11 MMOL/L (ref 8–16)
AST SERPL-CCNC: 21 UNIT/L (ref 11–45)
BASOPHILS # BLD AUTO: 0.04 K/UL
BASOPHILS NFR BLD AUTO: 0.3 %
BILIRUB SERPL-MCNC: 0.2 MG/DL (ref 0.1–1)
BNP SERPL-MCNC: 11 PG/ML (ref 0–99)
BUN SERPL-MCNC: 16 MG/DL (ref 8–23)
CALCIUM SERPL-MCNC: 9.3 MG/DL (ref 8.7–10.5)
CHLORIDE SERPL-SCNC: 107 MMOL/L (ref 95–110)
CO2 SERPL-SCNC: 20 MMOL/L (ref 23–29)
CREAT SERPL-MCNC: 0.8 MG/DL (ref 0.5–1.4)
ERYTHROCYTE [DISTWIDTH] IN BLOOD BY AUTOMATED COUNT: 14.4 % (ref 11.5–14.5)
GFR SERPLBLD CREATININE-BSD FMLA CKD-EPI: >60 ML/MIN/1.73/M2
GLUCOSE SERPL-MCNC: 98 MG/DL (ref 70–110)
HCT VFR BLD AUTO: 42.7 % (ref 37–48.5)
HCV AB SERPL QL IA: NEGATIVE
HGB BLD-MCNC: 13.5 GM/DL (ref 12–16)
HIV 1+2 AB+HIV1 P24 AG SERPL QL IA: NEGATIVE
HOLD SPECIMEN: NORMAL
IMM GRANULOCYTES # BLD AUTO: 0.04 K/UL (ref 0–0.04)
IMM GRANULOCYTES NFR BLD AUTO: 0.3 % (ref 0–0.5)
LIPASE SERPL-CCNC: 63 U/L (ref 4–60)
LYMPHOCYTES # BLD AUTO: 3.62 K/UL (ref 1–4.8)
MCH RBC QN AUTO: 25.4 PG (ref 27–31)
MCHC RBC AUTO-ENTMCNC: 31.6 G/DL (ref 32–36)
MCV RBC AUTO: 80 FL (ref 82–98)
NUCLEATED RBC (/100WBC) (OHS): 0 /100 WBC
PLATELET # BLD AUTO: 270 K/UL (ref 150–450)
PMV BLD AUTO: 9.5 FL (ref 9.2–12.9)
POTASSIUM SERPL-SCNC: 3.7 MMOL/L (ref 3.5–5.1)
PROT SERPL-MCNC: 7.9 GM/DL (ref 6–8.4)
RBC # BLD AUTO: 5.32 M/UL (ref 4–5.4)
RELATIVE EOSINOPHIL (OHS): 2.8 %
RELATIVE LYMPHOCYTE (OHS): 25.7 % (ref 18–48)
RELATIVE MONOCYTE (OHS): 8.5 % (ref 4–15)
RELATIVE NEUTROPHIL (OHS): 62.4 % (ref 38–73)
SODIUM SERPL-SCNC: 138 MMOL/L (ref 136–145)
TROPONIN I SERPL DL<=0.01 NG/ML-MCNC: 0.01 NG/ML
WBC # BLD AUTO: 14.07 K/UL (ref 3.9–12.7)

## 2025-05-18 PROCEDURE — 99285 EMERGENCY DEPT VISIT HI MDM: CPT | Mod: 25

## 2025-05-18 PROCEDURE — 80053 COMPREHEN METABOLIC PANEL: CPT | Performed by: EMERGENCY MEDICINE

## 2025-05-18 PROCEDURE — 83880 ASSAY OF NATRIURETIC PEPTIDE: CPT | Performed by: EMERGENCY MEDICINE

## 2025-05-18 PROCEDURE — 93010 ELECTROCARDIOGRAM REPORT: CPT | Mod: ,,, | Performed by: INTERNAL MEDICINE

## 2025-05-18 PROCEDURE — 86803 HEPATITIS C AB TEST: CPT | Performed by: EMERGENCY MEDICINE

## 2025-05-18 PROCEDURE — 85025 COMPLETE CBC W/AUTO DIFF WBC: CPT | Performed by: EMERGENCY MEDICINE

## 2025-05-18 PROCEDURE — 84484 ASSAY OF TROPONIN QUANT: CPT | Performed by: EMERGENCY MEDICINE

## 2025-05-18 PROCEDURE — 87389 HIV-1 AG W/HIV-1&-2 AB AG IA: CPT | Performed by: EMERGENCY MEDICINE

## 2025-05-18 PROCEDURE — 83690 ASSAY OF LIPASE: CPT | Performed by: EMERGENCY MEDICINE

## 2025-05-18 PROCEDURE — 93005 ELECTROCARDIOGRAM TRACING: CPT

## 2025-05-18 NOTE — ED NOTES
Bed: 02  Expected date:   Expected time:   Means of arrival: Personal Transportation  Comments:  When clean

## 2025-05-18 NOTE — ED PROVIDER NOTES
SCRIBE #1 NOTE: I, Hoda Naik, am scribing for, and in the presence of, Irish Cochran MD. I have scribed the entire note.       History     Chief Complaint   Patient presents with    Shortness of Breath     Pt with history of emphysema c/o SOB that began last night, worsening, and chest pain with cough and breathing.  Pt also c/o vomiting x several days.     Review of patient's allergies indicates:  No Known Allergies      History of Present Illness     HPI    5/18/2025, 2:09 PM  History obtained from the patient and medical records      History of Present Illness: Jacqueline Luna is a 69 y.o. female patient with a PMHx of COPD, HTN, fibrocystic breast, hypercholesteremia, DM type 2, and breast cancer, anxiety who presents to the Emergency Department for evaluation of SOB which began last night. Pt states on Friday she had N/V/D but has not vomited since.  Symptoms are constant and moderate in severity. Associated sxs include cough and abdominal tenderness. Patient denies any fever, chills, leg pain, calf pain, dizziness, no syncope or presyncope or blood in stool. No prior Tx specified. Pt states she is not on any medications and is not receiving chemotherapy for her breast cancer. No further complaints or concerns at this time.       Arrival mode: Personal Transportation    PCP: Claudio Christianson Jr., MD        Past Medical History:  Past Medical History:   Diagnosis Date    Anxiety disorder, unspecified     COPD (chronic obstructive pulmonary disease)     Depression     Family history of malignant neoplasm of breast 08/08/2023    Fibrocystic breast     HTN (hypertension)     Hypercholesteremia     Mass of multiple sites of left breast 08/07/2023    Mass of multiple sites of right breast 08/07/2023    Mastodynia 08/08/2023    Mild intermittent asthma, uncomplicated     Pneumonia, unspecified organism     Type 2 diabetes mellitus without complications        Past Surgical History:  Past Surgical History:    Procedure Laterality Date    CLOSED REDUCTION, FINGER, WITH PINNING Right 2023    Procedure: CLOSED REDUCTION, FINGER, WITH PINNING;  Surgeon: Nawaf Mcclure MD;  Location: [facility] OR;  Service: Orthopedics;  Laterality: Right;  closed reduction and pinning right thumb proximal phalanx fracture    CORONARY ANGIOGRAPHY N/A 2021    Procedure: ANGIOGRAM, CORONARY ARTERY;  Surgeon: Shruti Harrison MD;  Location: [facility] CATH LAB;  Service: Cardiology;  Laterality: N/A;    CORONARY ANGIOPLASTY WITH STENT PLACEMENT N/A 2021    Procedure: Angioplasty, Coronary Artery, With Stent Insertion;  Surgeon: Shruti Harrison MD;  Location: [facility] CATH LAB;  Service: Cardiology;  Laterality: N/A;    LEFT HEART CATHETERIZATION Left 2021    Procedure: CATHETERIZATION, HEART, LEFT;  Surgeon: Shruti Harrison MD;  Location: [facility] CATH LAB;  Service: Cardiology;  Laterality: Left;    REFRACTIVE SURGERY Bilateral 10/2024    Dr Kvng Mercer         Family History:  Family History   Problem Relation Name Age of Onset    Diabetes Mother      Hypertension Mother      Cancer Mother      Cancer Father      COPD Father      Breast cancer Sister          dx 40's    Breast cancer Sister  74    Breast cancer Maternal Aunt      Breast cancer Maternal Aunt      Ovarian cancer Neg Hx         Social History:  Social History     Tobacco Use    Smoking status: Former     Current packs/day: 0.00     Average packs/day: 0.5 packs/day for 35.2 years (17.6 ttl pk-yrs)     Types: Cigarettes     Start date: 1986     Quit date: 3/5/2021     Years since quittin.2    Smokeless tobacco: Never   Substance and Sexual Activity    Alcohol use: Yes     Comment: occassional    Drug use: Never    Sexual activity: Not Currently     Birth control/protection: Abstinence        Review of Systems     Review of Systems   Constitutional:  Negative for fever.   HENT:  Negative for sore throat.    Respiratory:  Positive for cough and shortness  of breath.    Cardiovascular:  Positive for chest pain.   Gastrointestinal:  Positive for diarrhea (Watery, multiple times today) and nausea. Negative for blood in stool and vomiting.   Genitourinary:  Negative for dysuria.   Musculoskeletal:  Negative for back pain.   Skin:  Negative for rash.   Neurological:  Negative for weakness.   Hematological:  Does not bruise/bleed easily.   All other systems reviewed and are negative.     Physical Exam     Initial Vitals [05/18/25 1216]   BP Pulse Resp Temp SpO2   (!) 177/81 79 18 97.7 °F (36.5 °C) 97 %      MAP       --          Physical Exam  Nursing Notes and Vital Signs Reviewed.  Constitutional: Patient is in no acute distress. Well-developed and well-nourished.  Head: Atraumatic. Normocephalic.  Eyes: PERRL. EOM intact. Conjunctivae are not pale. No scleral icterus.  ENT: Mucous membranes are moist. Oropharynx is clear and symmetric.    Neck: Supple. Full ROM. No lymphadenopathy.  Cardiovascular: Regular rate. Regular rhythm. No murmurs, rubs, or gallops. Distal pulses are 2+ and symmetric.  Pulmonary/Chest: No respiratory distress. Clear to auscultation bilaterally. No wheezing or rales.  Abdominal: Soft and non-distended.  There is no tenderness.  No rebound, guarding, or rigidity. Good bowel sounds.  Genitourinary: No CVA tenderness.  Musculoskeletal: Moves all extremities. No obvious deformities. No edema. No calf tenderness.  Skin: Warm and dry.  Neurological:  Alert, awake, and appropriate.  Normal speech. Anxious. No acute focal neurological deficits are appreciated.  Psychiatric: Normal affect. Good eye contact. Appropriate in content.     ED Course   Procedures  ED Vital Signs:  Vitals:    05/18/25 1216 05/18/25 1400   BP: (!) 177/81 (!) 163/97   Pulse: 79 73   Resp: 18 19   Temp: 97.7 °F (36.5 °C)    TempSrc: Oral    SpO2: 97% 97%   Weight: 84.4 kg (186 lb)        Abnormal Lab Results:  Labs Reviewed   COMPREHENSIVE METABOLIC PANEL - Abnormal       Result  Value    Sodium 138      Potassium 3.7      Chloride 107      CO2 20 (*)     Glucose 98      BUN 16      Creatinine 0.8      Calcium 9.3      Protein Total 7.9      Albumin 3.6      Bilirubin Total 0.2            AST 21      ALT 19      Anion Gap 11      eGFR >60     CBC WITH DIFFERENTIAL - Abnormal    WBC 14.07 (*)     RBC 5.32      HGB 13.5      HCT 42.7      MCV 80 (*)     MCH 25.4 (*)     MCHC 31.6 (*)     RDW 14.4      Platelet Count 270      MPV 9.5      Nucleated RBC 0      Neut % 62.4      Lymph % 25.7      Mono % 8.5      Eos % 2.8      Basophil % 0.3      Imm Grans % 0.3      Neut # 8.78 (*)     Lymph # 3.62      Mono # 1.20 (*)     Eos # 0.39      Baso # 0.04      Imm Grans # 0.04     LIPASE - Abnormal    Lipase Level 63 (*)    HEPATITIS C ANTIBODY - Normal    Hep C Ab Interp Negative     HIV 1 / 2 ANTIBODY - Normal    HIV 1/2 Ag/Ab Negative     TROPONIN I - Normal    Troponin-I 0.008     B-TYPE NATRIURETIC PEPTIDE - Normal    BNP 11     HEP C VIRUS HOLD SPECIMEN    Extra Tube Hold for add-ons.     CBC W/ AUTO DIFFERENTIAL    Narrative:     The following orders were created for panel order CBC auto differential.  Procedure                               Abnormality         Status                     ---------                               -----------         ------                     CBC with Differential[[id number]]       Abnormal            Final result                 Please view results for these tests on the individual orders.        All Lab Results:  Results for orders placed or performed during the hospital encounter of 05/18/25   Hepatitis C Antibody    Collection Time: 05/18/25  1:48 PM   Result Value Ref Range    Hep C Ab Interp Negative Negative   HCV Virus Hold Specimen    Collection Time: 05/18/25  1:48 PM   Result Value Ref Range    Extra Tube Hold for add-ons.    HIV 1/2 Ag/Ab (4th Gen)    Collection Time: 05/18/25  1:48 PM   Result Value Ref Range    HIV 1/2 Ag/Ab Negative Negative    Comprehensive metabolic panel    Collection Time: 05/18/25  1:48 PM   Result Value Ref Range    Sodium 138 136 - 145 mmol/L    Potassium 3.7 3.5 - 5.1 mmol/L    Chloride 107 95 - 110 mmol/L    CO2 20 (L) 23 - 29 mmol/L    Glucose 98 70 - 110 mg/dL    BUN 16 8 - 23 mg/dL    Creatinine 0.8 0.5 - 1.4 mg/dL    Calcium 9.3 8.7 - 10.5 mg/dL    Protein Total 7.9 6.0 - 8.4 gm/dL    Albumin 3.6 3.5 - 5.2 g/dL    Bilirubin Total 0.2 0.1 - 1.0 mg/dL     40 - 150 unit/L    AST 21 11 - 45 unit/L    ALT 19 10 - 44 unit/L    Anion Gap 11 8 - 16 mmol/L    eGFR >60 >60 mL/min/1.73/m2   Troponin I    Collection Time: 05/18/25  1:48 PM   Result Value Ref Range    Troponin-I 0.008 <=0.026 ng/mL   Brain natriuretic peptide    Collection Time: 05/18/25  1:48 PM   Result Value Ref Range    BNP 11 0 - 99 pg/mL   CBC with Differential    Collection Time: 05/18/25  1:48 PM   Result Value Ref Range    WBC 14.07 (H) 3.90 - 12.70 K/uL    RBC 5.32 4.00 - 5.40 M/uL    HGB 13.5 12.0 - 16.0 gm/dL    HCT 42.7 37.0 - 48.5 %    MCV 80 (L) 82 - 98 fL    MCH 25.4 (L) 27.0 - 31.0 pg    MCHC 31.6 (L) 32.0 - 36.0 g/dL    RDW 14.4 11.5 - 14.5 %    Platelet Count 270 150 - 450 K/uL    MPV 9.5 9.2 - 12.9 fL    Nucleated RBC 0 <=0 /100 WBC    Neut % 62.4 38 - 73 %    Lymph % 25.7 18 - 48 %    Mono % 8.5 4 - 15 %    Eos % 2.8 <=8 %    Basophil % 0.3 <=1.9 %    Imm Grans % 0.3 0.0 - 0.5 %    Neut # 8.78 (H) 1.8 - 7.7 K/uL    Lymph # 3.62 1 - 4.8 K/uL    Mono # 1.20 (H) 0.3 - 1 K/uL    Eos # 0.39 <=0.5 K/uL    Baso # 0.04 <=0.2 K/uL    Imm Grans # 0.04 0.00 - 0.04 K/uL   Lipase    Collection Time: 05/18/25  1:48 PM   Result Value Ref Range    Lipase Level 63 (H) 4 - 60 U/L       Imaging Results:  Imaging Results              X-Ray Chest AP (Final result)  Result time 05/18/25 14:25:48      Final result by Irving De La Cruz MD (05/18/25 14:25:48)                   Narrative:    EXAM: XR CHEST AP PORTABLE    CLINICAL HISTORY: shortness of  breath;    COMPARISON:  08/29/2024    FINDINGS:  Heart size is normal and lungs are clear bilaterally.  Pulmonary vasculature is normal.    IMPRESSION:  No evidence of acute disease.    Finalized on: 5/18/2025 2:25 PM By:  Irving De La Cruz  [facility]# [id number]      2025-05-18 14:27:53.822     [facility]                                     X-Ray Abdomen Flat And Erect (Final result)  Result time 05/18/25 14:24:57      Final result by Cyrus Pelaez MD (05/18/25 14:24:57)                   Impression:      1.  Negative for acute process involving the abdomen or pelvis.  2.  Stable and incidental findings as noted above    Finalized on: 5/18/2025 2:24 PM By:  Cyrus Pelaez MD  [facility]# [id number]      2025-05-18 14:27:03.778     [facility]               Narrative:    EXAM: XR ABDOMEN FLAT AND ERECT    CLINICAL HISTORY: Nausea and vomiting    FINDINGS:     Comparisons are made to 12/04/2024.  EKG leads and clothing artifact are present.  Appropriate stool burden. The abdominal gas pattern is normal. No sign of bowel dilation, air/fluid levels or free air.    Age-appropriate degenerative changes of the spine and pelvis.                                         The EKG was ordered, reviewed, and independently interpreted by the ED provider.  Interpretation time: 12:19  Rate: 80 BPM  Rhythm: normal sinus rhythm  Interpretation: Inferior infarct. Anterolateral infarct. Abnormal ECG. No STEMI.           The Emergency Provider reviewed the vital signs and test results, which are outlined above.     ED Discussion     3:21 PM: Reassessed pt at this time. Discussed with patient and/or family/caretaker all pertinent ED information and results. Discussed pt dx and plan of tx. Gave the patient all f/u and return to the ED instructions. All questions and concerns were addressed at this time. Patient and/or family/caretaker expresses understanding of information and instructions, and is comfortable with plan to discharge. Pt is stable for discharge.      I discussed with patient and/or family/caretaker that evaluation in the ED does not suggest any emergent or life threatening medical conditions requiring immediate intervention beyond what was provided in the ED, and I believe patient is safe for discharge.  Regardless, an unremarkable evaluation in the ED does not preclude the development or presence of a serious of life threatening condition. As such, I instructed that the patient is to return immediately for any worsening or change in current symptoms.       Medical Decision Making  DDX: 1. Gastroenteritis 2. COPD 3. CHF 4. ACS 5. Anxiety    ECG NSR, no acute ischemic changes, CXR and Abd xray normal, wbc mldly elevated, trop and BNP normal, no vomiting or diarrhea in the ER, abd exam benign, no concerns for ACS or PE, no pneumonia, symptoms seem more releated to anxiety, overall she appears stable for discharge.     Amount and/or Complexity of Data Reviewed  Labs: ordered. Decision-making details documented in ED Course.  Radiology: ordered. Decision-making details documented in ED Course.  ECG/medicine tests: ordered and independent interpretation performed. Decision-making details documented in ED Course.                ED Medication(s):  Medications - No data to display    New Prescriptions    No medications on file               Scribe Attestation:   Scribe #1: I performed the above scribed service and the documentation accurately describes the services I performed. I attest to the accuracy of the note.     Attending:   Physician Attestation Statement for Scribe #1: I, Irish Cochran MD, personally performed the services described in this documentation, as scribed by Hoda Naik, in my presence, and it is both accurate and complete.           Clinical Impression       ICD-10-CM ICD-9-CM   1. Nausea vomiting and diarrhea  R11.2 787.91    R19.7 787.01   2. SOB (shortness of breath)  R06.02 786.05   3. Nausea & vomiting  R11.2 787.01   4. Chronic  hypertension  I10 401.9       Disposition:   Disposition: Discharged  Condition: Stable        Irish Cochran MD  05/26/25 1005

## 2025-05-19 LAB
OHS QRS DURATION: 80 MS
OHS QTC CALCULATION: 435 MS

## 2025-07-30 ENCOUNTER — OFFICE VISIT (OUTPATIENT)
Dept: CARDIOLOGY | Facility: CLINIC | Age: 70
End: 2025-07-30
Payer: MEDICARE

## 2025-07-30 VITALS
HEIGHT: 61 IN | BODY MASS INDEX: 35.63 KG/M2 | SYSTOLIC BLOOD PRESSURE: 132 MMHG | OXYGEN SATURATION: 98 % | WEIGHT: 188.69 LBS | DIASTOLIC BLOOD PRESSURE: 84 MMHG | HEART RATE: 85 BPM

## 2025-07-30 DIAGNOSIS — I10 ESSENTIAL HYPERTENSION: ICD-10-CM

## 2025-07-30 DIAGNOSIS — Z95.5 STATUS POST PRIMARY ANGIOPLASTY WITH CORONARY STENT: ICD-10-CM

## 2025-07-30 DIAGNOSIS — E66.01 MORBID OBESITY: ICD-10-CM

## 2025-07-30 DIAGNOSIS — E78.2 MIXED HYPERLIPIDEMIA: ICD-10-CM

## 2025-07-30 DIAGNOSIS — M54.9 BACK PAIN, UNSPECIFIED BACK LOCATION, UNSPECIFIED BACK PAIN LATERALITY, UNSPECIFIED CHRONICITY: ICD-10-CM

## 2025-07-30 DIAGNOSIS — I25.10 ATHEROSCLEROSIS OF NATIVE CORONARY ARTERY OF NATIVE HEART WITHOUT ANGINA PECTORIS: ICD-10-CM

## 2025-07-30 DIAGNOSIS — R07.89 OTHER CHEST PAIN: ICD-10-CM

## 2025-07-30 DIAGNOSIS — Z95.5 S/P CORONARY ARTERY STENT PLACEMENT: Primary | ICD-10-CM

## 2025-07-30 DIAGNOSIS — I77.9 CAROTID ARTERY DISEASE, UNSPECIFIED LATERALITY, UNSPECIFIED TYPE: ICD-10-CM

## 2025-07-30 DIAGNOSIS — I11.0 HYPERTENSIVE HEART DISEASE WITH HEART FAILURE: ICD-10-CM

## 2025-07-30 PROCEDURE — 3008F BODY MASS INDEX DOCD: CPT | Mod: CPTII,S$GLB,, | Performed by: INTERNAL MEDICINE

## 2025-07-30 PROCEDURE — 3075F SYST BP GE 130 - 139MM HG: CPT | Mod: CPTII,S$GLB,, | Performed by: INTERNAL MEDICINE

## 2025-07-30 PROCEDURE — 99999 PR PBB SHADOW E&M-EST. PATIENT-LVL IV: CPT | Mod: PBBFAC,,, | Performed by: INTERNAL MEDICINE

## 2025-07-30 PROCEDURE — 3079F DIAST BP 80-89 MM HG: CPT | Mod: CPTII,S$GLB,, | Performed by: INTERNAL MEDICINE

## 2025-07-30 PROCEDURE — 99214 OFFICE O/P EST MOD 30 MIN: CPT | Mod: S$GLB,,, | Performed by: INTERNAL MEDICINE

## 2025-07-30 PROCEDURE — 4010F ACE/ARB THERAPY RXD/TAKEN: CPT | Mod: CPTII,S$GLB,, | Performed by: INTERNAL MEDICINE

## 2025-07-30 PROCEDURE — 1159F MED LIST DOCD IN RCRD: CPT | Mod: CPTII,S$GLB,, | Performed by: INTERNAL MEDICINE

## 2025-07-30 NOTE — PROGRESS NOTES
Subjective:   Patient ID:  07/30/2025      Jacqueline uLna is a 69 y.o. female who presents for evaluation of No chief complaint on file.      History of Present Illness    CHIEF COMPLAINT:  Patient presents today with chest heaviness and pressure.    HISTORY OF PRESENT ILLNESS:  She reports experiencing heavy pressure in chest upon waking and during certain movements. The discomfort is described as a heaviness with a pulling sensation, primarily located in the chest and extending to RUE and upper back. The sensation is not sharp pain, but rather a persistent, heavy feeling that impacts her daily activities. She notes the sensation is most prominent in the morning and during specific movements, and does not increase with palpation. She reports feeling sluggish and slow with significant limitations in walking due to dyspnea. She experiences heaviness in the throat area while walking, which impedes her ability to ambulate.    MEDICAL HISTORY:  She has a history of RCA stent placement in 2021 and COPD. She had an ER visit in May for dyspnea where labs and EKG ruled out MI.    MEDICATIONS:  She takes aspirin and Plavix. Brilinta was previously discontinued. Beta blocker dose was recently increased due to suboptimal BP control. She denies any stroke or complications related to current antiplatelet regimen.      ROS:  ROS findings as noted in HPI.         HPI    Past Medical History:   Diagnosis Date    Anxiety disorder, unspecified     COPD (chronic obstructive pulmonary disease)     Depression     Family history of malignant neoplasm of breast 08/08/2023    Fibrocystic breast     HTN (hypertension)     Hypercholesteremia     Mass of multiple sites of left breast 08/07/2023    Mass of multiple sites of right breast 08/07/2023    Mastodynia 08/08/2023    Mild intermittent asthma, uncomplicated     Pneumonia, unspecified organism     Type 2 diabetes mellitus without complications        Past Surgical History:    Procedure Laterality Date    CLOSED REDUCTION, FINGER, WITH PINNING Right 08/11/2023    Procedure: CLOSED REDUCTION, FINGER, WITH PINNING;  Surgeon: Nawaf Mcclure MD;  Location: [facility] OR;  Service: Orthopedics;  Laterality: Right;  closed reduction and pinning right thumb proximal phalanx fracture    CORONARY ANGIOGRAPHY N/A 03/12/2021    Procedure: ANGIOGRAM, CORONARY ARTERY;  Surgeon: Shruti Harrison MD;  Location: [facility] CATH LAB;  Service: Cardiology;  Laterality: N/A;    CORONARY ANGIOPLASTY WITH STENT PLACEMENT N/A 03/12/2021    Procedure: Angioplasty, Coronary Artery, With Stent Insertion;  Surgeon: Shruti Harrison MD;  Location: [facility] CATH LAB;  Service: Cardiology;  Laterality: N/A;    LEFT HEART CATHETERIZATION Left 03/12/2021    Procedure: CATHETERIZATION, HEART, LEFT;  Surgeon: Shruti Harrison MD;  Location: [facility] CATH LAB;  Service: Cardiology;  Laterality: Left;    REFRACTIVE SURGERY Bilateral 10/2024    Dr Kvng Mercer       Social History[1]    Family History   Problem Relation Name Age of Onset    Diabetes Mother      Hypertension Mother      Cancer Mother      Cancer Father      COPD Father      Breast cancer Sister          dx 40's    Breast cancer Sister  74    Breast cancer Maternal Aunt      Breast cancer Maternal Aunt      Ovarian cancer Neg Hx           ROS    Current Outpatient Medications on File Prior to Visit   Medication Sig    albuterol (PROVENTIL/VENTOLIN HFA) 90 mcg/actuation inhaler Inhale 2 puffs into the lungs every 4 (four) hours as needed for Wheezing or Shortness of Breath. Rescue    amLODIPine (NORVASC) 10 MG tablet Take 10 mg by mouth once daily.    atorvastatin (LIPITOR) 80 MG tablet Take 1 tablet (80 mg total) by mouth every evening.    budesonide-glycopyr-formoterol 160-9-4.8 mcg/actuation HFAA Inhale 2 puffs into the lungs 2 (two) times daily. Wash out mouth after use.    cholecalciferol, vitamin D3, 1,250 mcg (50,000 unit) capsule Take 50,000 Units by mouth  every 7 days.    clopidogreL (PLAVIX) 75 mg tablet TAKE 1 TABLET EVERY DAY    ezetimibe (ZETIA) 10 mg tablet Take 10 mg by mouth once daily.    furosemide (LASIX) 20 MG tablet Take 1 tablet (20 mg total) by mouth daily as needed (swelling).    lisinopriL-hydrochlorothiazide (PRINZIDE,ZESTORETIC) 20-12.5 mg per tablet Take 1 tablet by mouth 2 (two) times a day.    loratadine (CLARITIN) 10 mg tablet Take 10 mg by mouth daily as needed.    metoprolol succinate (TOPROL-XL) 100 MG 24 hr tablet Take 1 tablet (100 mg total) by mouth once daily.    pantoprazole (PROTONIX) 40 MG tablet Take 40 mg by mouth once daily.    rifAXIMin (XIFAXAN) 550 mg Tab Take 550 mg by mouth 3 (three) times daily.    sertraline (ZOLOFT) 100 MG tablet Take 100 mg by mouth once daily.    metFORMIN (GLUCOPHAGE-XR) 500 MG ER 24hr tablet Take 500 mg by mouth once daily.    ondansetron (ZOFRAN) 4 MG tablet Take 1 tablet (4 mg total) by mouth every 6 (six) hours as needed for Nausea.    traMADoL (ULTRAM) 50 mg tablet Take 1 tablet (50 mg total) by mouth every 6 (six) hours as needed.    TRUE METRIX GLUCOSE TEST STRIP Strp SMARTSIG:Via Meter    TRUEPLUS LANCETS 33 gauge Misc 1 lancet  by Each Nostril route 2 (two) times a day.     Current Facility-Administered Medications on File Prior to Visit   Medication    chlorhexidine 0.12 % solution 10 mL       Objective:   Objective:  Wt Readings from Last 3 Encounters:   07/30/25 85.6 kg (188 lb 11.4 oz)   05/18/25 84.4 kg (186 lb)   05/12/25 85.7 kg (188 lb 15 oz)     Temp Readings from Last 3 Encounters:   05/18/25 97.7 °F (36.5 °C) (Oral)   02/07/25 98.1 °F (36.7 °C) (Oral)   12/06/24 97.6 °F (36.4 °C) (Oral)     BP Readings from Last 3 Encounters:   07/30/25 132/84   05/18/25 (!) 156/75   05/12/25 (!) 162/72     Pulse Readings from Last 3 Encounters:   07/30/25 85   05/18/25 67   05/12/25 73       Physical Exam    General: No acute distress. Well-developed. Well-nourished.  Eyes: EOMI. Sclerae  anicteric.  HENT: Normocephalic. Atraumatic. Nares patent. Moist oral mucosa.  Ears: Bilateral TMs clear. Bilateral EACs clear.  Cardiovascular: Regular rate. Regular rhythm. No murmurs. No rubs. No gallops. Normal S1, S2.  Respiratory: Normal respiratory effort. Clear to auscultation bilaterally. No rales. No rhonchi. No wheezing.  Abdomen: Soft. Non-tender. Non-distended. Normoactive bowel sounds.  Musculoskeletal: No  obvious deformity.  Extremities: No lower extremity edema.  Neurological: Alert & oriented x3. No slurred speech. Normal gait.  Psychiatric: Normal mood. Normal affect. Good insight. Good judgment.  Skin: Warm. Dry. No rash.  MSK: Spine - Thoracic: Thoracic tenderness (Left). Thoracic tenderness (Right). Pain reproducible with movement in upper back.         Physical Exam      Lab Results   Component Value Date    CHOL 259 (H) 01/20/2025    CHOL 130 05/25/2023    CHOL 193 11/28/2022     Lab Results   Component Value Date    LDLCALC 180 (H) 01/20/2025    LDLCALC 68 05/25/2023    LDLCALC 115 (H) 11/28/2022         Chemistry        Component Value Date/Time     05/18/2025 1348     02/07/2025 1704    K 3.7 05/18/2025 1348    K 4.4 02/07/2025 1704     05/18/2025 1348     02/07/2025 1704    CO2 20 (L) 05/18/2025 1348    CO2 23 02/07/2025 1704    BUN 16 05/18/2025 1348    CREATININE 0.8 05/18/2025 1348    GLU 98 05/18/2025 1348    GLU 87 02/07/2025 1704        Component Value Date/Time    CALCIUM 9.3 05/18/2025 1348    CALCIUM 10.1 02/07/2025 1704    ALKPHOS 120 05/18/2025 1348    ALKPHOS 123 02/07/2025 1704    AST 21 05/18/2025 1348    AST 13 02/07/2025 1704    ALT 19 05/18/2025 1348    ALT 12 02/07/2025 1704    BILITOT 0.2 05/18/2025 1348    BILITOT 0.3 02/07/2025 1704    ESTGFRAFRICA >60 03/01/2022 1550    EGFRNONAA >60 03/01/2022 1550          Lab Results   Component Value Date    TSH 1.040 12/05/2024       Lab Results   Component Value Date    WBC 14.07 (H) 05/18/2025    HGB  13.5 05/18/2025    HCT 42.7 05/18/2025    MCV 80 (L) 05/18/2025     05/18/2025       @LABRCNTIP(BNP,BNPTRIAGEBLO)@       Imaging:  ======    No results found for this or any previous visit.    No results found for this or any previous visit.    Results for orders placed during the hospital encounter of 05/03/24    X-Ray Chest PA And Lateral    Narrative  EXAM: XR CHEST PA AND LATERAL    CLINICAL HISTORY:  Shortness of breath    TECHNIQUE: 2 view chest    PRIOR:  August 2023    FINDINGS:  The cardiomediastinum is normal and the lungs are clear.  Thoracic spondylosis    Impression  Stable exam without acute cardiopulmonary disease    Finalized on: 5/3/2024 2:06 PM By:  Quirino Mi MD  [facility]# [id number]      2024-05-03 14:08:[id number]    [facility]      No results found for this or any previous visit.      No valid procedures specified.        No results found for this or any previous visit.      Results for orders placed during the hospital encounter of 03/12/21    Echo Color Flow Doppler? Yes    Interpretation Summary  · Concentric hypertrophy and normal systolic function. The estimated ejection fraction is 55%  · Mild tricuspid regurgitation.  · Normal left ventricular diastolic function.  · There are segmental left ventricular wall motion abnormalities.  · With normal right ventricular systolic function.  · Normal central venous pressure (3 mmHg).  · The estimated PA systolic pressure is 21 mmHg.      Results for orders placed during the hospital encounter of 03/12/21    Cardiac catheterization    Conclusion  · The pre-procedure left ventricular end diastolic pressure was 10.  · The Mid RCA lesion was 100% stenosed with 0% stenosis post-intervention.  · A stent was successfully placed.  · The estimated blood loss was none.  · There was single vessel coronary artery disease.  · This was a successful PCI for acute myocardial infarction.  · Please provide patient with brilinta and aspirin prescription to bedside before  discharge    The procedure log was documented by Documenter: Brayan Motley RN and verified by Shruti Harrison MD.    Date: 3/13/2021  Time: 10:22 AM      Lab Results   Component Value Date    HGBA1C 6.5 (H) 05/25/2023         The ASCVD Risk score (Mac RAY, et al., 2019) failed to calculate for the following reasons:    Risk score cannot be calculated because patient has a medical history suggesting prior/existing ASCVD    Diagnostic Results:  ECG: Reviewed    Assessment and Plan:   S/P coronary artery stent placement    Morbid obesity    Hypertensive heart disease with heart failure    Atherosclerosis of native coronary artery of native heart without angina pectoris    Mixed hyperlipidemia    Essential hypertension    Status post primary angioplasty with coronary stent 2021 rca    Carotid artery disease, unspecified laterality, unspecified type    Back pain, unspecified back location, unspecified back pain laterality, unspecified chronicity  -     X-Ray Thoracic Spine AP Lateral; Future; Expected date: 07/30/2025    Other chest pain  -     Nuclear Stress - Cardiology Interpreted; Future        Assessment & Plan    E66.01 Morbid obesity  I11.0 Hypertensive heart disease with heart failure  Z95.5 S/P coronary artery stent placement  I25.10 Atherosclerosis of native coronary artery of native heart without angina pectoris  E78.2 Mixed hyperlipidemia  I10 Essential hypertension  I77.9 Carotid artery disease, unspecified laterality, unspecified type  M54.9 Back pain, unspecified back location, unspecified back pain laterality, unspecified chronicity  R07.89 Other chest pain    Determined chest heaviness and arm/back pain to be musculoskeletal in origin rather than cardiac.  Considered bone spurs or slipped disc in thoracic spine as potential cause of symptoms.  Explained that reproducible pain with movement is likely not cardiac in origin.    Z95.5 S/P CORONARY ARTERY STENT PLACEMENT:  - Continued aspirin 81 mg and  Plavix as current dual antiplatelet therapy pending further investigation.    I25.10 ATHEROSCLEROSIS OF NATIVE CORONARY ARTERY OF NATIVE HEART WITHOUT ANGINA PECTORIS:  - Ordered stress test with follow up to be scheduled.    M54.9 BACK PAIN, UNSPECIFIED BACK LOCATION, UNSPECIFIED BACK PAIN LATERALITY, UNSPECIFIED CHRONICITY:  - Ordered XR Thoracic Spine to evaluate for bone spurs or other abnormalities.  - Suggested potential referral to pain management or sports medicine for possible injection therapy, pending XR results.  - Recommend follow-up with primary care physician (Dr. Claudio Christianson) for further evaluation of back pain.    R07.89 OTHER CHEST PAIN:  - Ordered stress test with follow up to be scheduled.             Healthy weight goals were discussed in clinic  Reviewed all tests and above medical conditions with patient in detail and formulated treatment plan.             [1]   Social History  Tobacco Use    Smoking status: Former     Current packs/day: 0.00     Average packs/day: 0.5 packs/day for 35.2 years (17.6 ttl pk-yrs)     Types: Cigarettes     Start date: 1986     Quit date: 3/5/2021     Years since quittin.4    Smokeless tobacco: Never   Substance Use Topics    Alcohol use: Yes     Comment: occassional    Drug use: Never

## 2025-08-11 ENCOUNTER — HOSPITAL ENCOUNTER (OUTPATIENT)
Dept: RADIOLOGY | Facility: HOSPITAL | Age: 70
Discharge: HOME | End: 2025-08-11
Attending: INTERNAL MEDICINE
Payer: MEDICARE

## 2025-08-11 DIAGNOSIS — Z87.891 STOPPED SMOKING WITH GREATER THAN 20 PACK YEAR HISTORY: ICD-10-CM

## 2025-08-11 PROCEDURE — 71271 CT THORAX LUNG CANCER SCR C-: CPT | Mod: TC

## 2025-08-11 PROCEDURE — 71271 CT THORAX LUNG CANCER SCR C-: CPT | Mod: 26,,, | Performed by: RADIOLOGY

## 2025-08-17 DIAGNOSIS — Z87.891 STOPPED SMOKING WITH GREATER THAN 20 PACK YEAR HISTORY: Primary | ICD-10-CM

## 2025-08-24 RX ORDER — MELOXICAM 15 MG/1
15 TABLET ORAL DAILY
Qty: 90 TABLET | Refills: 0 | Status: SHIPPED | OUTPATIENT
Start: 2025-08-24

## (undated) DEVICE — CATH IMPULSE PIGTAIL 6F 110CM

## (undated) DEVICE — BANDAGE MATRIX HK LOOP 3IN 5YD

## (undated) DEVICE — CATH DIAG IMPULSE 6FR FR4

## (undated) DEVICE — IMMOBILIZER SHOULDER MEDIUM

## (undated) DEVICE — PACK CATH LAB CUSTOM BR

## (undated) DEVICE — APPLICATOR CHLORAPREP ORN 26ML

## (undated) DEVICE — GUIDE LAUNCHER 6FR JR 4.0

## (undated) DEVICE — SHEATH INTRODUCER 6FR 11CM

## (undated) DEVICE — CATH NC QUANTUM APEX MR 4X12

## (undated) DEVICE — PACK ANGIOPLASTY ACCESS PLUS

## (undated) DEVICE — GUIDEWIRE EMERALD .035IN 260CM

## (undated) DEVICE — DRAPE MOBILE C-ARM

## (undated) DEVICE — Device

## (undated) DEVICE — OMNIPAQUE 300MG 150ML VIAL

## (undated) DEVICE — SUPPORT ULNA NERVE PROTECTOR

## (undated) DEVICE — COVER LIGHT HANDLE 80/CA

## (undated) DEVICE — SPONGE COTTON TRAY 4X4IN

## (undated) DEVICE — ALCOHOL 70% ISOP RUBBING 4OZ

## (undated) DEVICE — GLOVE BIOGEL SZ 8 1/2

## (undated) DEVICE — INFLATOR ENCORE 26 BLLN INFL

## (undated) DEVICE — HEADREST ROUND DISP FOAM 9IN

## (undated) DEVICE — CATH PANTERA PRO BLLN 2.5X20MM

## (undated) DEVICE — GUIDEWIRE ALLSTAR .014X190

## (undated) DEVICE — PAD CAST SPECIALIST STRL 4

## (undated) DEVICE — DRESSING XEROFORM 1X8IN

## (undated) DEVICE — CATH DIAG IMPULSE 6FR FL4

## (undated) DEVICE — CATH JL3.5 5FR

## (undated) DEVICE — CATH IMPULSE 6FR MULTI-PAK